# Patient Record
Sex: MALE | Race: WHITE | NOT HISPANIC OR LATINO | Employment: OTHER | URBAN - METROPOLITAN AREA
[De-identification: names, ages, dates, MRNs, and addresses within clinical notes are randomized per-mention and may not be internally consistent; named-entity substitution may affect disease eponyms.]

---

## 2017-01-17 RX ORDER — LISINOPRIL 20 MG/1
TABLET ORAL
Qty: 90 TABLET | Refills: 0 | Status: SHIPPED | OUTPATIENT
Start: 2017-01-17 | End: 2017-03-15 | Stop reason: SDUPTHER

## 2017-03-15 RX ORDER — LISINOPRIL 20 MG/1
TABLET ORAL
Qty: 90 TABLET | Refills: 2 | Status: ON HOLD | OUTPATIENT
Start: 2017-03-15 | End: 2017-08-02

## 2017-06-16 ENCOUNTER — TRANSCRIBE ORDERS (OUTPATIENT)
Dept: GASTROENTEROLOGY | Facility: CLINIC | Age: 77
End: 2017-06-16

## 2017-06-16 DIAGNOSIS — K22.70 BARRETT'S ESOPHAGUS WITHOUT DYSPLASIA: Primary | ICD-10-CM

## 2017-06-19 ENCOUNTER — TELEPHONE (OUTPATIENT)
Dept: CARDIOLOGY | Facility: CLINIC | Age: 77
End: 2017-06-19

## 2017-06-19 NOTE — TELEPHONE ENCOUNTER
Pt called stating that he has been feeling draggy and fatigued here lately. He said his b/p has been running at 114//50. 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 He seems to think that maybe he should decrease his lisinopril 20mg. Pls advise or call pt at [phone].

## 2017-07-20 ENCOUNTER — TELEPHONE (OUTPATIENT)
Dept: CARDIOLOGY | Facility: CLINIC | Age: 77
End: 2017-07-20

## 2017-07-20 NOTE — TELEPHONE ENCOUNTER
Pt called and reported an update since  the decrease Lisinopril dose to 10 mg at night.  C/o light headed and fatigue.  His bp readings for June are as follows:  120/60, 120/56, 124/57.  Yesterday pt was 108/49 and this morning before his med he was 113/55, he was not able to give me hr.  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  He starts at 0600 - 0830 comes in for breakfast and then is back out for the rest of the day.  He states that he tries to stay hydrated and keep water with him.      Current meds:   Amlodipine 5 mg in the a.m then 2.5 mg pm  Lisinopril 20 mg (1/2 tablet qhs)  Cardura 4 mg at 12 for prostate.

## 2017-08-02 ENCOUNTER — HOSPITAL ENCOUNTER (OUTPATIENT)
Facility: HOSPITAL | Age: 77
Setting detail: HOSPITAL OUTPATIENT SURGERY
Discharge: HOME | End: 2017-08-02
Attending: INTERNAL MEDICINE | Admitting: INTERNAL MEDICINE

## 2017-08-02 ENCOUNTER — ANESTHESIA (OUTPATIENT)
Dept: GASTROENTEROLOGY | Facility: HOSPITAL | Age: 77
End: 2017-08-02

## 2017-08-02 ENCOUNTER — ANESTHESIA EVENT (OUTPATIENT)
Dept: GASTROENTEROLOGY | Facility: HOSPITAL | Age: 77
End: 2017-08-02

## 2017-08-02 VITALS
SYSTOLIC BLOOD PRESSURE: 132 MMHG | HEIGHT: 70 IN | BODY MASS INDEX: 25.94 KG/M2 | OXYGEN SATURATION: 96 % | TEMPERATURE: 98.2 F | WEIGHT: 181.2 LBS | HEART RATE: 52 BPM | DIASTOLIC BLOOD PRESSURE: 73 MMHG | RESPIRATION RATE: 16 BRPM

## 2017-08-02 DIAGNOSIS — K22.70 BARRETT'S ESOPHAGUS WITHOUT DYSPLASIA: ICD-10-CM

## 2017-08-02 PROCEDURE — 87081 CULTURE SCREEN ONLY: CPT | Performed by: INTERNAL MEDICINE

## 2017-08-02 PROCEDURE — S0260 H&P FOR SURGERY: HCPCS | Performed by: INTERNAL MEDICINE

## 2017-08-02 PROCEDURE — 25010000002 PROPOFOL 10 MG/ML EMULSION: Performed by: ANESTHESIOLOGY

## 2017-08-02 PROCEDURE — 43239 EGD BIOPSY SINGLE/MULTIPLE: CPT | Performed by: INTERNAL MEDICINE

## 2017-08-02 PROCEDURE — 88312 SPECIAL STAINS GROUP 1: CPT | Performed by: INTERNAL MEDICINE

## 2017-08-02 PROCEDURE — 88305 TISSUE EXAM BY PATHOLOGIST: CPT | Performed by: INTERNAL MEDICINE

## 2017-08-02 PROCEDURE — 25010000002 PROPOFOL 1000 MG/ML EMULSION: Performed by: ANESTHESIOLOGY

## 2017-08-02 RX ORDER — SODIUM CHLORIDE 0.9 % (FLUSH) 0.9 %
1-10 SYRINGE (ML) INJECTION AS NEEDED
Status: DISCONTINUED | OUTPATIENT
Start: 2017-08-02 | End: 2017-08-02 | Stop reason: HOSPADM

## 2017-08-02 RX ORDER — PROPOFOL 10 MG/ML
VIAL (ML) INTRAVENOUS AS NEEDED
Status: DISCONTINUED | OUTPATIENT
Start: 2017-08-02 | End: 2017-08-02 | Stop reason: SURG

## 2017-08-02 RX ORDER — SODIUM CHLORIDE, SODIUM LACTATE, POTASSIUM CHLORIDE, CALCIUM CHLORIDE 600; 310; 30; 20 MG/100ML; MG/100ML; MG/100ML; MG/100ML
30 INJECTION, SOLUTION INTRAVENOUS CONTINUOUS PRN
Status: DISCONTINUED | OUTPATIENT
Start: 2017-08-02 | End: 2017-08-02 | Stop reason: HOSPADM

## 2017-08-02 RX ORDER — LIDOCAINE HYDROCHLORIDE 20 MG/ML
INJECTION, SOLUTION INFILTRATION; PERINEURAL AS NEEDED
Status: DISCONTINUED | OUTPATIENT
Start: 2017-08-02 | End: 2017-08-02 | Stop reason: SURG

## 2017-08-02 RX ADMIN — PROPOFOL 160 MCG/KG/MIN: 10 INJECTION, EMULSION INTRAVENOUS at 15:22

## 2017-08-02 RX ADMIN — PROPOFOL 140 MG: 10 INJECTION, EMULSION INTRAVENOUS at 15:22

## 2017-08-02 RX ADMIN — SODIUM CHLORIDE, POTASSIUM CHLORIDE, SODIUM LACTATE AND CALCIUM CHLORIDE 30 ML/HR: 600; 310; 30; 20 INJECTION, SOLUTION INTRAVENOUS at 14:31

## 2017-08-02 RX ADMIN — LIDOCAINE HYDROCHLORIDE 40 MG: 20 INJECTION, SOLUTION INFILTRATION; PERINEURAL at 15:22

## 2017-08-02 NOTE — ANESTHESIA POSTPROCEDURE EVALUATION
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

## 2017-08-02 NOTE — H&P
"[facility]  Pre Procedure History & Physical    Chief Complaint:   GERD, Rivas's esophagus    Subjective     HPI:   76-year-old male presents to the endoscopy suite for upper endoscopic evaluation.  He has a history of reflux as well as Rivas's esophagus.  Last upper endoscopy performed was June 2015    Past Medical History:   Past Medical History:   Diagnosis Date   • GERD (gastroesophageal reflux disease)    • Health care maintenance    • Hyperlipidemia    • Hypertension    • Osteoarthritis    • Prostate cancer        Past Surgical History:  Past Surgical History:   Procedure Laterality Date   • ROTATOR CUFF REPAIR     • TOTAL HIP ARTHROPLASTY         Family History:  History reviewed. No pertinent family history.    Social History:   reports that he has never smoked. He has never used smokeless tobacco. He reports that he does not drink alcohol.    Medications:   Prescriptions Prior to Admission   Medication Sig Dispense Refill Last Dose   • amLODIPine (NORVASC) 2.5 MG tablet Take 2.5 mg by mouth Daily. Taking 5mg in am and 2.5 mg in pm   8/2/2017 at Unknown time   • doxazosin (CARDURA) 4 MG tablet Take 4 mg by mouth Daily. AT NOON  FOR PROSTATE   8/1/2017 at Unknown time   • esomeprazole (NEXIUM) 40 MG capsule Take 40 mg by mouth.   8/2/2017 at Unknown time   • ibuprofen (ADVIL,MOTRIN) 800 MG tablet Take 800 mg by mouth.   Past Week at Unknown time   • niacin 500 MG tablet Take 500 mg by mouth Daily.   8/1/2017 at Unknown time   • Omega-3 Fatty Acids (FISH OIL) 1000 MG capsule capsule Take  by mouth.   8/1/2017 at Unknown time   • aspirin 81 MG tablet Take  by mouth Daily.   7/31/2017       Allergies:  Metoclopramide    ROS:    Pertinent items are noted in HPI, all other systems reviewed and negative     Objective     Blood pressure 147/74, pulse 56, temperature 97.5 °F (36.4 °C), temperature source Oral, resp. rate 18, height 70\" (177.8 cm), weight 181 lb 3.2 oz (82.2 kg), SpO2 98 " %.    Physical Exam   Constitutional: Pt is oriented to person, place, and time and well-developed, well-nourished, and in no distress.   Mouth/Throat: Oropharynx is clear and moist.   Neck: Normal range of motion.   Cardiovascular: Normal rate, regular rhythm and normal heart sounds.    Pulmonary/Chest: Effort normal and breath sounds normal.   Abdominal: Soft. Nontender  Skin: Skin is warm and dry.   Psychiatric: Mood, memory, affect and judgment normal.     Assessment/Plan     Diagnosis:  GERD  Rivas's esophagus    Anticipated Surgical Procedure:  EGD    The risks, benefits, and alternatives of this procedure have been discussed with the patient or the responsible party- the patient understands and agrees to proceed.

## 2017-08-02 NOTE — PLAN OF CARE
Problem: Patient Care Overview (Adult)  Goal: Plan of Care Review  Outcome: Ongoing (interventions implemented as appropriate)    08/02/17 1412   Coping/Psychosocial Response Interventions   Plan Of Care Reviewed With patient       Goal: Adult Individualization and Mutuality  Outcome: Ongoing (interventions implemented as appropriate)  Goal: Discharge Needs Assessment  Outcome: Ongoing (interventions implemented as appropriate)    08/02/17 1412   Discharge Needs Assessment   Concerns To Be Addressed no discharge needs identified   Discharge Disposition home or self-care   Living Environment   Transportation Available car;family or friend will provide         Problem: GI Endoscopy (Adult)  Goal: Signs and Symptoms of Listed Potential Problems Will be Absent or Manageable (GI Endoscopy)  Outcome: Ongoing (interventions implemented as appropriate)    08/02/17 1412   GI Endoscopy   Problems Assessed (GI Endoscopy) all   Problems Present (GI Endoscopy) none

## 2017-08-02 NOTE — ANESTHESIA PREPROCEDURE EVALUATION
Anesthesia Evaluation     Patient summary reviewed   NPO Solid Status: > 8 hours  NPO Liquid Status: > 6 hours     Airway   Mallampati: I  TM distance: >3 FB  Dental      Pulmonary    Cardiovascular     Rhythm: regular  Rate: normal    (+) hypertension, hyperlipidemia      Neuro/Psych  GI/Hepatic/Renal/Endo    (+)  GERD,     Musculoskeletal     Abdominal    Substance History      OB/GYN          Other   (+) arthritis   history of cancer remission                                    Anesthesia Plan    ASA 2     MAC   total IV anesthesia  intravenous induction   Anesthetic plan and risks discussed with patient.

## 2017-08-03 LAB — UREASE TISS QL: NEGATIVE

## 2017-08-04 LAB
CYTO UR: NORMAL
LAB AP CASE REPORT: NORMAL
Lab: NORMAL
PATH REPORT.FINAL DX SPEC: NORMAL
PATH REPORT.GROSS SPEC: NORMAL

## 2017-08-14 ENCOUNTER — TELEPHONE (OUTPATIENT)
Dept: GASTROENTEROLOGY | Facility: CLINIC | Age: 77
End: 2017-08-14

## 2017-08-14 NOTE — TELEPHONE ENCOUNTER
----- Message from Valentino PEÑA MD sent at 8/4/2017  3:01 PM EDT -----  Regarding: Biopsy results  Okay to call results, no evidence of Rivas's esophagus on this or the previous examination.  Would continue PPI, consider follow-up EGD in 5 years.  Office follow-up annually, call sooner as needed  ----- Message -----     From: Lab, Background User     Sent: 8/3/2017   5:15 PM       To: Valentino PEÑA MD

## 2017-08-14 NOTE — TELEPHONE ENCOUNTER
Call to spouse, Kassidy (see HIPPA auth of 11/9/16).  Advise per path report that duodenum bx with no celiac sprue.  Stomach antrum with mild chronic gastritis, no H pylori.  Benign fundic gland polyp.  GE junction with mild chronic inflammation.  Advise per Dr Stern that with no evidence of Rivas's esophagus on this or previous exam, would continue PPI.  Consider f/u EGD in 5 yrs.  Office f/u annually, call sooner as needed.  Kassidy verb understanding.    O/v for 8/3/18 and EGD for 8/3/22 placed in recall.

## 2017-11-14 ENCOUNTER — OFFICE VISIT (OUTPATIENT)
Dept: CARDIOLOGY | Facility: CLINIC | Age: 77
End: 2017-11-14

## 2017-11-14 VITALS
BODY MASS INDEX: 27.06 KG/M2 | WEIGHT: 189 LBS | SYSTOLIC BLOOD PRESSURE: 140 MMHG | DIASTOLIC BLOOD PRESSURE: 80 MMHG | HEIGHT: 70 IN | HEART RATE: 57 BPM

## 2017-11-14 DIAGNOSIS — I10 ESSENTIAL HYPERTENSION: Primary | ICD-10-CM

## 2017-11-14 DIAGNOSIS — R01.1 MURMUR: ICD-10-CM

## 2017-11-14 PROCEDURE — 99214 OFFICE O/P EST MOD 30 MIN: CPT | Performed by: INTERNAL MEDICINE

## 2017-11-14 PROCEDURE — 93000 ELECTROCARDIOGRAM COMPLETE: CPT | Performed by: INTERNAL MEDICINE

## 2017-11-14 NOTE — PROGRESS NOTES
Date of Office Visit: 2017  Encounter Provider: Elmira Cabezas MD  Place of Service: [facility]  Patient Name: Quincy Roberts  :1940      Patient ID:  Quincy Roberts is a 77 y.o. male is here for  followup for hypertension.        History of Present Illness    I first saw in the chest pain unit at  [facility].  He presented there with exertional chest pain, short-windedness,  and fatigue.  He went to the emergency department and his blood pressure was very high at  about 200/120.  He had not had that issue in quite some time.  Because of his chest pain,  we did an ischemic workup.  He ruled out for a myocardial infarction and had a stress  nuclear perfusion study done on January 10, 2013, which showed no ischemia.  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  During his hospitalization, he also had a lipid panel done on  January 10, 2013, which showed triglycerides of 59, HDL of 31, LDL of 82, and total  cholesterol of 125.     He did have some left upper quadrant pain and for  that he saw Dr. Stern.  He subsequently had an EGD.  He also had a CT of his abdomen  and pelvis.  The EGD looked fine.  The CT of the abdomen and pelvis suggested diverticular  disease.  He then had a colonoscopy performed and 7 polyps were removed, and I think they  have been treating him for the diverticular disease. His last EGD was 2017 and was normal.         He was diagnosed with prostate cancer in 2015. He sees Dr. Garcia  at [facility]. He underwent radiation therapy for that and it has helped, not only the  prostatic hypertrophy, but the prostate cancer.     He checks his blood pressure at home.  It's 120-130/80s.  He doesn't feels heart racing or skipping.  He's had no dizziness or syncope.  His blood pressure did get low and he was feeling fatigued.  This is why his  lisinopril was stopped.  He's had no exertional chest tightness or pressure.  He has no difficulty breathing with activity.  He is taking his medications as directed.  He continues to farm full time, he has cattle.    Past Medical History:   Diagnosis Date   • GERD (gastroesophageal reflux disease)    • Health care maintenance    • Hyperlipidemia    • Hypertension    • Osteoarthritis    • Prostate cancer          Past Surgical History:   Procedure Laterality Date   • ENDOSCOPY N/A 8/2/2017    Procedure: ESOPHAGOGASTRODUODENOSCOPY WITH COLD BIOPSIES;  Surgeon: Valentino PEÑA MD;  Location: [facility] ENDOSCOPY;  Service:    • ROTATOR CUFF REPAIR     • TOTAL HIP ARTHROPLASTY         Current Outpatient Prescriptions on File Prior to Visit   Medication Sig Dispense Refill   • amLODIPine (NORVASC) 2.5 MG tablet Take 2.5 mg by mouth Daily. Taking 5mg in am and 2.5 mg in pm     • aspirin 81 MG tablet Take  by mouth Daily.     • doxazosin (CARDURA) 4 MG tablet Take 4 mg by mouth Daily. AT NOON  FOR PROSTATE     • esomeprazole (NEXIUM) 40 MG capsule Take 40 mg by mouth.     • ibuprofen (ADVIL,MOTRIN) 800 MG tablet Take 800 mg by mouth.     • niacin 500 MG tablet Take 500 mg by mouth Daily.     • Omega-3 Fatty Acids (FISH OIL) 1000 MG capsule capsule Take  by mouth.       No current facility-administered medications on file prior to visit.        Social History     Social History   • Marital status:      Spouse name: N/A   • Number of children: N/A   • Years of education: N/A     Occupational History   • Not on file.     Social History Main Topics   • Smoking status: Never Smoker   • Smokeless tobacco: Never Used      Comment: caffine use   • Alcohol use No   • Drug use: Not on file   • Sexual activity: Not on file     Other Topics Concern   • Not on file     Social History Narrative           Review of Systems   Constitution: Negative.   HENT: Negative for congestion.    Eyes: Negative for vision loss in left eye  "and vision loss in right eye.   Respiratory: Negative.  Negative for cough, hemoptysis, shortness of breath, sleep disturbances due to breathing, snoring, sputum production and wheezing.    Endocrine: Negative.    Hematologic/Lymphatic: Negative.    Skin: Negative for poor wound healing and rash.   Musculoskeletal: Negative for falls, gout, muscle cramps and myalgias.   Gastrointestinal: Negative for abdominal pain, diarrhea, dysphagia, hematemesis, melena, nausea and vomiting.   Neurological: Negative for excessive daytime sleepiness, dizziness, headaches, light-headedness, loss of balance, seizures and vertigo.   Psychiatric/Behavioral: Negative for depression and substance abuse. The patient is not nervous/anxious.        Procedures    ECG 12 Lead  Date/Time: 11/14/2017 12:58 PM  Performed by: GREG LONDON  Authorized by: GREG LONDON   Comparison: compared with previous ECG   Similar to previous ECG  Rhythm: sinus rhythm  Clinical impression: normal ECG               Objective:      Vitals:    11/14/17 1249   BP: 140/80   BP Location: Right arm   Pulse: 57   Weight: 189 lb (85.7 kg)   Height: 70\" (177.8 cm)     Body mass index is 27.12 kg/(m^2).    Physical Exam   Constitutional: He is oriented to person, place, and time. He appears well-developed and well-nourished. No distress.   HENT:   Head: Normocephalic and atraumatic.   Eyes: Conjunctivae are normal. No scleral icterus.   Neck: Neck supple. No JVD present. Carotid bruit is not present. No thyromegaly present.   Cardiovascular: Normal rate, regular rhythm, S1 normal, S2 normal and intact distal pulses.   No extrasystoles are present. PMI is not displaced.  Exam reveals no gallop.    Murmur heard.   Harsh midsystolic murmur is present with a grade of 3/6  at the upper right sternal border, upper left sternal border  Pulses:       Carotid pulses are 2+ on the right side, and 2+ on the left side.       Radial pulses are 2+ on the right side, " and 2+ on the left side.        Dorsalis pedis pulses are 2+ on the right side, and 2+ on the left side.        Posterior tibial pulses are 2+ on the right side, and 2+ on the left side.   Pulmonary/Chest: Effort normal and breath sounds normal. No respiratory distress. He has no wheezes. He has no rhonchi. He has no rales. He exhibits no tenderness.   Abdominal: Soft. Bowel sounds are normal. He exhibits no distension, no abdominal bruit and no mass. There is no tenderness.   Musculoskeletal: He exhibits no edema or deformity.   Lymphadenopathy:     He has no cervical adenopathy.   Neurological: He is alert and oriented to person, place, and time. No cranial nerve deficit.   Skin: Skin is warm and dry. No rash noted. He is not diaphoretic. No cyanosis. No pallor. Nails show no clubbing.   Psychiatric: He has a normal mood and affect. Judgment normal.   Vitals reviewed.      Lab Review:       Assessment:      Diagnosis Plan   1. Essential hypertension       1. Non-cardiac chest pain. Normal stress nuclear perfusion study done January 2013.  2. Hypertension, well controlled. Could not tolerate coreg.  Is on amlodipine.   3. History of renal cyst, stable.  4. Hyperlipidemia in the form of low HDL.   5. 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   6. Stage 1 prostate cancer 2/2015, s/p radiation with Dr. Burns.     Plan:       Check echo for murmur, see NP in 1 year.  No med changes.

## 2017-11-21 ENCOUNTER — HOSPITAL ENCOUNTER (OUTPATIENT)
Dept: CARDIOLOGY | Facility: HOSPITAL | Age: 77
Discharge: HOME | End: 2017-11-21
Attending: INTERNAL MEDICINE | Admitting: INTERNAL MEDICINE

## 2017-11-21 VITALS
BODY MASS INDEX: 27.06 KG/M2 | HEIGHT: 70 IN | HEART RATE: 61 BPM | DIASTOLIC BLOOD PRESSURE: 80 MMHG | WEIGHT: 189 LBS | SYSTOLIC BLOOD PRESSURE: 150 MMHG

## 2017-11-21 DIAGNOSIS — R01.1 MURMUR: ICD-10-CM

## 2017-11-21 DIAGNOSIS — I10 ESSENTIAL HYPERTENSION: ICD-10-CM

## 2017-11-21 LAB
ASCENDING AORTA: 3.2 CM
BH CV ECHO MEAS - ACS: 1.9 CM
BH CV ECHO MEAS - AO MAX PG (FULL): 1.2 MMHG
BH CV ECHO MEAS - AO MAX PG: 5.9 MMHG
BH CV ECHO MEAS - AO MEAN PG (FULL): 1.4 MMHG
BH CV ECHO MEAS - AO MEAN PG: 4.1 MMHG
BH CV ECHO MEAS - AO ROOT AREA (BSA CORRECTED): 1.6
BH CV ECHO MEAS - AO ROOT AREA: 8.4 CM^2
BH CV ECHO MEAS - AO ROOT DIAM: 3.3 CM
BH CV ECHO MEAS - AO V2 MAX: 121.8 CM/SEC
BH CV ECHO MEAS - AO V2 MEAN: 97.9 CM/SEC
BH CV ECHO MEAS - AO V2 VTI: 27.6 CM
BH CV ECHO MEAS - AVA(I,A): 3.1 CM^2
BH CV ECHO MEAS - AVA(I,D): 3.1 CM^2
BH CV ECHO MEAS - AVA(V,A): 3 CM^2
BH CV ECHO MEAS - AVA(V,D): 3 CM^2
BH CV ECHO MEAS - BSA(HAYCOCK): 2.1 M^2
BH CV ECHO MEAS - BSA: 2 M^2
BH CV ECHO MEAS - BZI_BMI: 27.1 KILOGRAMS/M^2
BH CV ECHO MEAS - BZI_METRIC_HEIGHT: 177.8 CM
BH CV ECHO MEAS - BZI_METRIC_WEIGHT: 85.7 KG
BH CV ECHO MEAS - CONTRAST EF (2CH): 59.1 ML/M^2
BH CV ECHO MEAS - CONTRAST EF 4CH: 60.2 ML/M^2
BH CV ECHO MEAS - EDV(MOD-SP2): 137 ML
BH CV ECHO MEAS - EDV(MOD-SP4): 166 ML
BH CV ECHO MEAS - EDV(TEICH): 164 ML
BH CV ECHO MEAS - EF(CUBED): 62.7 %
BH CV ECHO MEAS - EF(MOD-SP2): 59.1 %
BH CV ECHO MEAS - EF(MOD-SP4): 60.2 %
BH CV ECHO MEAS - EF(TEICH): 53.5 %
BH CV ECHO MEAS - ESV(MOD-SP2): 56 ML
BH CV ECHO MEAS - ESV(MOD-SP4): 66 ML
BH CV ECHO MEAS - ESV(TEICH): 76.3 ML
BH CV ECHO MEAS - FS: 28 %
BH CV ECHO MEAS - IVS/LVPW: 1.1
BH CV ECHO MEAS - IVSD: 1 CM
BH CV ECHO MEAS - LAT PEAK E' VEL: 5 CM/SEC
BH CV ECHO MEAS - LV DIASTOLIC VOL/BSA (35-75): 81.5 ML/M^2
BH CV ECHO MEAS - LV MASS(C)D: 230.1 GRAMS
BH CV ECHO MEAS - LV MASS(C)DI: 112.9 GRAMS/M^2
BH CV ECHO MEAS - LV MAX PG: 4.8 MMHG
BH CV ECHO MEAS - LV MEAN PG: 2.7 MMHG
BH CV ECHO MEAS - LV SYSTOLIC VOL/BSA (12-30): 32.4 ML/M^2
BH CV ECHO MEAS - LV V1 MAX: 109.1 CM/SEC
BH CV ECHO MEAS - LV V1 MEAN: 75.9 CM/SEC
BH CV ECHO MEAS - LV V1 VTI: 25.4 CM
BH CV ECHO MEAS - LVIDD: 5.8 CM
BH CV ECHO MEAS - LVIDS: 4.1 CM
BH CV ECHO MEAS - LVLD AP2: 8 CM
BH CV ECHO MEAS - LVLD AP4: 8 CM
BH CV ECHO MEAS - LVLS AP2: 6.9 CM
BH CV ECHO MEAS - LVLS AP4: 7.1 CM
BH CV ECHO MEAS - LVOT AREA (M): 3.5 CM^2
BH CV ECHO MEAS - LVOT AREA: 3.4 CM^2
BH CV ECHO MEAS - LVOT DIAM: 2.1 CM
BH CV ECHO MEAS - LVPWD: 0.96 CM
BH CV ECHO MEAS - MED PEAK E' VEL: 8 CM/SEC
BH CV ECHO MEAS - MR MAX PG: 117.9 MMHG
BH CV ECHO MEAS - MR MAX VEL: 542.9 CM/SEC
BH CV ECHO MEAS - MV A DUR: 0.12 SEC
BH CV ECHO MEAS - MV A MAX VEL: 92.6 CM/SEC
BH CV ECHO MEAS - MV DEC SLOPE: 391.8 CM/SEC^2
BH CV ECHO MEAS - MV DEC TIME: 0.16 SEC
BH CV ECHO MEAS - MV E MAX VEL: 65.5 CM/SEC
BH CV ECHO MEAS - MV E/A: 0.71
BH CV ECHO MEAS - MV MAX PG: 3 MMHG
BH CV ECHO MEAS - MV MEAN PG: 1.3 MMHG
BH CV ECHO MEAS - MV P1/2T MAX VEL: 64 CM/SEC
BH CV ECHO MEAS - MV P1/2T: 47.9 MSEC
BH CV ECHO MEAS - MV V2 MAX: 86.1 CM/SEC
BH CV ECHO MEAS - MV V2 MEAN: 52.1 CM/SEC
BH CV ECHO MEAS - MV V2 VTI: 35.2 CM
BH CV ECHO MEAS - MVA P1/2T LCG: 3.4 CM^2
BH CV ECHO MEAS - MVA(P1/2T): 4.6 CM^2
BH CV ECHO MEAS - MVA(VTI): 2.4 CM^2
BH CV ECHO MEAS - PA ACC TIME: 0.13 SEC
BH CV ECHO MEAS - PA MAX PG (FULL): 6.2 MMHG
BH CV ECHO MEAS - PA MAX PG: 8.1 MMHG
BH CV ECHO MEAS - PA PR(ACCEL): 22 MMHG
BH CV ECHO MEAS - PA V2 MAX: 142.5 CM/SEC
BH CV ECHO MEAS - PI END-D VEL: 103.1 CM/SEC
BH CV ECHO MEAS - PULM A REVS DUR: 0.1 SEC
BH CV ECHO MEAS - PULM A REVS VEL: 29.6 CM/SEC
BH CV ECHO MEAS - PULM DIAS VEL: 40.7 CM/SEC
BH CV ECHO MEAS - PULM S/D: 1.8
BH CV ECHO MEAS - PULM SYS VEL: 75.2 CM/SEC
BH CV ECHO MEAS - PVA(V,A): 1.8 CM^2
BH CV ECHO MEAS - PVA(V,D): 1.8 CM^2
BH CV ECHO MEAS - QP/QS: 0.6
BH CV ECHO MEAS - RAP SYSTOLE: 8 MMHG
BH CV ECHO MEAS - RV MAX PG: 2 MMHG
BH CV ECHO MEAS - RV MEAN PG: 1 MMHG
BH CV ECHO MEAS - RV V1 MAX: 69.8 CM/SEC
BH CV ECHO MEAS - RV V1 MEAN: 46.6 CM/SEC
BH CV ECHO MEAS - RV V1 VTI: 14.1 CM
BH CV ECHO MEAS - RVOT AREA: 3.6 CM^2
BH CV ECHO MEAS - RVOT DIAM: 2.2 CM
BH CV ECHO MEAS - RVSP: 46 MMHG
BH CV ECHO MEAS - SI(AO): 114.3 ML/M^2
BH CV ECHO MEAS - SI(CUBED): 58.8 ML/M^2
BH CV ECHO MEAS - SI(LVOT): 41.8 ML/M^2
BH CV ECHO MEAS - SI(MOD-SP2): 39.7 ML/M^2
BH CV ECHO MEAS - SI(MOD-SP4): 49.1 ML/M^2
BH CV ECHO MEAS - SI(TEICH): 43.1 ML/M^2
BH CV ECHO MEAS - SUP REN AO DIAM: 1.9 CM
BH CV ECHO MEAS - SV(AO): 233 ML
BH CV ECHO MEAS - SV(CUBED): 119.9 ML
BH CV ECHO MEAS - SV(LVOT): 85.1 ML
BH CV ECHO MEAS - SV(MOD-SP2): 81 ML
BH CV ECHO MEAS - SV(MOD-SP4): 100 ML
BH CV ECHO MEAS - SV(RVOT): 51.3 ML
BH CV ECHO MEAS - SV(TEICH): 87.7 ML
BH CV ECHO MEAS - TAPSE (>1.6): 2.4 CM2
BH CV ECHO MEAS - TR MAX VEL: 308.1 CM/SEC
BH CV XLRA - RV BASE: 3.8 CM
BH CV XLRA - TDI S': 16 CM/SEC
E/E' RATIO: 0.7
LEFT ATRIUM VOLUME INDEX: 36 ML/M2
LV EF 2D ECHO EST: 60 %
SINUS: 3.1 CM
STJ: 2.9 CM

## 2017-11-21 PROCEDURE — 93306 TTE W/DOPPLER COMPLETE: CPT | Performed by: INTERNAL MEDICINE

## 2017-11-21 PROCEDURE — 93306 TTE W/DOPPLER COMPLETE: CPT

## 2017-11-27 ENCOUNTER — TELEPHONE (OUTPATIENT)
Dept: CARDIOLOGY | Facility: CLINIC | Age: 77
End: 2017-11-27

## 2017-11-27 DIAGNOSIS — I27.20 PULMONARY HYPERTENSION (HCC): Primary | ICD-10-CM

## 2017-11-27 NOTE — TELEPHONE ENCOUNTER
Pt called and wants to know if you can send in a referral to Dr Wesley Cortes's office.....Lena GOLDEN

## 2017-12-01 ENCOUNTER — HOSPITAL ENCOUNTER (OUTPATIENT)
Dept: NUCLEAR MEDICINE | Facility: HOSPITAL | Age: 77
Discharge: HOME | End: 2017-12-01
Attending: INTERNAL MEDICINE

## 2017-12-01 ENCOUNTER — TRANSCRIBE ORDERS (OUTPATIENT)
Dept: ADMINISTRATIVE | Facility: HOSPITAL | Age: 77
End: 2017-12-01

## 2017-12-01 DIAGNOSIS — I27.20 PULMONARY HYPERTENSION (HCC): Primary | ICD-10-CM

## 2017-12-01 DIAGNOSIS — I27.20 PULMONARY HYPERTENSION (HCC): ICD-10-CM

## 2017-12-01 PROCEDURE — 78582 LUNG VENTILAT&PERFUS IMAGING: CPT

## 2017-12-01 PROCEDURE — 0 XENON XE 133: Performed by: INTERNAL MEDICINE

## 2017-12-01 PROCEDURE — A9558 XE133 XENON 10MCI: HCPCS | Performed by: INTERNAL MEDICINE

## 2017-12-01 PROCEDURE — A9540 TC99M MAA: HCPCS | Performed by: INTERNAL MEDICINE

## 2017-12-01 PROCEDURE — 0 TECHNETIUM ALBUMIN AGGREGATED: Performed by: INTERNAL MEDICINE

## 2017-12-01 RX ADMIN — Medication 1 DOSE: at 11:30

## 2017-12-01 RX ADMIN — XENON XE-133 8 MILLICURIE: 10 GAS RESPIRATORY (INHALATION) at 11:30

## 2017-12-20 ENCOUNTER — APPOINTMENT (OUTPATIENT)
Dept: GENERAL RADIOLOGY | Facility: HOSPITAL | Age: 77
End: 2017-12-20

## 2017-12-20 ENCOUNTER — APPOINTMENT (OUTPATIENT)
Dept: CT IMAGING | Facility: HOSPITAL | Age: 77
End: 2017-12-20

## 2017-12-20 ENCOUNTER — HOSPITAL ENCOUNTER (EMERGENCY)
Facility: HOSPITAL | Age: 77
Discharge: HOME | End: 2017-12-20
Attending: EMERGENCY MEDICINE | Admitting: EMERGENCY MEDICINE

## 2017-12-20 ENCOUNTER — TELEPHONE (OUTPATIENT)
Dept: ORTHOPEDIC SURGERY | Facility: CLINIC | Age: 77
End: 2017-12-20

## 2017-12-20 VITALS
DIASTOLIC BLOOD PRESSURE: 73 MMHG | TEMPERATURE: 98.5 F | HEART RATE: 72 BPM | RESPIRATION RATE: 18 BRPM | OXYGEN SATURATION: 97 % | SYSTOLIC BLOOD PRESSURE: 143 MMHG | WEIGHT: 180 LBS | HEIGHT: 70 IN | BODY MASS INDEX: 25.77 KG/M2

## 2017-12-20 DIAGNOSIS — M97.8XXA PERIPROSTHETIC FRACTURE OF FEMUR FOLLOWING TOTAL REPLACEMENT OF HIP, INITIAL ENCOUNTER: Primary | ICD-10-CM

## 2017-12-20 DIAGNOSIS — Z96.649 PERIPROSTHETIC FRACTURE OF FEMUR FOLLOWING TOTAL REPLACEMENT OF HIP, INITIAL ENCOUNTER: Primary | ICD-10-CM

## 2017-12-20 PROCEDURE — 73502 X-RAY EXAM HIP UNI 2-3 VIEWS: CPT

## 2017-12-20 PROCEDURE — 99283 EMERGENCY DEPT VISIT LOW MDM: CPT

## 2017-12-20 PROCEDURE — 72192 CT PELVIS W/O DYE: CPT

## 2017-12-20 NOTE — ED PROVIDER NOTES
The patient presents complaining of R hip pain s/p fall yesterday afternoon. Pt states that he has been able to stand, but is having difficulty walking.     Physical Exam:   Ambulation labored due to pain with weight bearing on R hip.     Radiology:   CT pelvis: nondisplaced cortical fracture and lateral L proximal femur.     Plan to d/c the pt with referrals to Dr. Zachary Barnard and Dr. Hutchison.     I supervised care provided by the midlevel provider.  We have discussed this patient's history, physical exam, and treatment plan.  I have reviewed the note and personally saw and examined the patient and agree with the plan of care.    Documentation assistance provided by grayson Parekh for Dr. Turner.  Information recorded by the grayson was done at my direction and has been verified and validated by me.           Refugio Parekh  12/20/17 8909       Sander Turner MD  12/20/17 3442

## 2017-12-20 NOTE — ED PROVIDER NOTES
EMERGENCY DEPARTMENT ENCOUNTER    CHIEF COMPLAINT  Chief Complaint: R hip  History given by: Pt  History limited by: Nothing  Room Number: 03/03  PMD: Jose Valle MD      HPI:  Pt is a 77 y.o. male who presents complaining of R hip pain s/p fall yesterday. Pt reports he was knocked over by a dairy cow yesterday and landed on his R hip. Pt reports he was able to ambulate with difficulty at the scene. He states his pain is not worsened by standing upright, but is worse with certain movements of his RLE. Pt states he had his R hip replaced in 2011. Pt denies any other complaints at this time.     Duration:  yesterday  Onset: Sudden  Timing: constant  Location: R hip  Radiation: none  Quality: pain s/p mechanical fall  Intensity/Severity: moderate  Progression: unchanged  Associated Symptoms: none stated  Aggravating Factors: movement  Alleviating Factors: none  Previous Episodes: None stated  Treatment before arrival: Pt states he had his R hip replaced in 2011    PAST MEDICAL HISTORY  Active Ambulatory Problems     Diagnosis Date Noted   • Hypertension      Resolved Ambulatory Problems     Diagnosis Date Noted   • No Resolved Ambulatory Problems     Past Medical History:   Diagnosis Date   • GERD (gastroesophageal reflux disease)    • Health care maintenance    • Hyperlipidemia    • Hypertension    • Osteoarthritis    • Prostate cancer        PAST SURGICAL HISTORY  Past Surgical History:   Procedure Laterality Date   • ENDOSCOPY N/A 8/2/2017    Procedure: ESOPHAGOGASTRODUODENOSCOPY WITH COLD BIOPSIES;  Surgeon: Valentino PEÑA MD;  Location: [facility] ENDOSCOPY;  Service:    • ROTATOR CUFF REPAIR     • TOTAL HIP ARTHROPLASTY         FAMILY HISTORY  Family History   Problem Relation Age of Onset   • Family history unknown: Yes       SOCIAL HISTORY  Social History     Social History   • Marital status:      Spouse name: N/A   • Number of children: N/A   • Years of education: N/A     Occupational  History   • Not on file.     Social History Main Topics   • Smoking status: Never Smoker   • Smokeless tobacco: Never Used      Comment: caffine use   • Alcohol use No   • Drug use: Not on file   • Sexual activity: Not on file     Other Topics Concern   • Not on file     Social History Narrative       ALLERGIES  Metoclopramide    REVIEW OF SYSTEMS  Review of Systems   Constitutional: Negative for fever.   Respiratory: Negative for shortness of breath.    Cardiovascular: Negative for chest pain.   Musculoskeletal: Positive for arthralgias (R hip pain).   Skin: Negative.    Neurological: Negative for weakness and numbness.   All other systems reviewed and are negative.      PHYSICAL EXAM  ED Triage Vitals   Temp Heart Rate Resp BP SpO2   12/20/17 1150 12/20/17 1150 12/20/17 1150 12/20/17 1150 12/20/17 1152   98.4 °F (36.9 °C) 76 16 124/68 95 %      Temp src Heart Rate Source Patient Position BP Location FiO2 (%)   12/20/17 1529 12/20/17 1529 12/20/17 1529 12/20/17 1529 --   Oral Monitor Sitting Right arm        Physical Exam   Constitutional: He is oriented to person, place, and time and well-developed, well-nourished, and in no distress.   HENT:   Head: Normocephalic and atraumatic.   Eyes: EOM are normal. Pupils are equal, round, and reactive to light.   Neck: Normal range of motion. Neck supple.   Cardiovascular: Normal rate, regular rhythm and normal heart sounds.    Pulmonary/Chest: Effort normal and breath sounds normal. No respiratory distress.   Abdominal: Soft. There is no tenderness. There is no rebound and no guarding.   Musculoskeletal: He exhibits no edema.        Right hip: He exhibits decreased range of motion (when attempting to ambulate).        Right knee: He exhibits normal range of motion.   No vertebral back tenderness. Pt is able to stand still on his feet without pain.    Neurological: He is alert and oriented to person, place, and time. He has normal sensation and normal strength.   Pt is  neurovascularly intact distally, and pt is able to lift his R leg off the bed.    Skin: Skin is warm and dry.   Psychiatric: Mood and affect normal.   Nursing note and vitals reviewed.    RADIOLOGY  CT Pelvis Without Contrast   Final Result       Questionable appearance of nondisplaced cortical fracture at the   anterior lateral aspect of the proximal femur inferior to the greater   trochanter, as described above.       Discussed by telephone with Dr. Ruiz at time of interpretation, 1651,   12/20/2017.       This report was finalized on 12/20/2017 4:54 PM by Dr. Reyes Anglin MD.          XR Hip With or Without Pelvis 2 - 3 View Right   Final Result       No acute fracture is identified. Follow up as indications persist.       This report was finalized on 12/20/2017 12:21 PM by Dr. Reyes Anglin MD.               I ordered the above noted radiological studies. Interpreted by radiologist. Discussed with radiologist (Dr. Anglin). Reviewed by me in PACS.       PROCEDURES  Procedures      PROGRESS AND CONSULTS  ED Course     1558 - CT pelvis ordered to rule out occult fracture.     1654 - Consult placed with ortho.      1655 - Rechecked pt. Pt is resting comfortably. Informed pt of the questionable greater trochanteric fracture. Informed them of the pending consult with Dr. Hutchison.     1708 - Consulted with Dr. Hutchison (orthopedist) who recommends the pt using a walker with limited weight bearing, and states he will see the pt in follow up.     1711 - Rechecked pt. Pt is resting comfortably. Informed pt of the consult with Dr. Hutchison and plans to discharge home to follow up with him. Pt and wife state they have a walker at home. Instructed pt to use the walker when ambulating after discharge. Pt and family understand and agree with plan. All questions answered.        MEDICAL DECISION MAKING  Results were reviewed/discussed with the patient and they were also made aware of online access. Pt also  made aware that some labs, such as cultures, will not be resulted during ER visit and follow up with PMD is necessary.     MDM  Number of Diagnoses or Management Options  Periprosthetic fracture of femur following total replacement of hip, initial encounter:      Amount and/or Complexity of Data Reviewed  Tests in the radiology section of CPT®: ordered and reviewed (XR R hip - no acute fracture  CT pelvis - Questionable nondisplaced cortical fracture at the anterior lateral aspect of the proximal femur)  Discussion of test results with the performing providers: yes (Dr. Anglin)  Discuss the patient with other providers: yes (Dr. Hutchison (ortho))           DIAGNOSIS  Final diagnoses:   Periprosthetic fracture of femur following total replacement of hip, initial encounter       DISPOSITION  DISCHARGE    Patient discharged in stable condition.    Reviewed implications of results, diagnosis, meds, responsibility to follow up, warning signs and symptoms of possible worsening, potential complications and reasons to return to ER.    Patient/Family voiced understanding of above instructions.    Discussed plan for discharge, as there is no emergent indication for admission.  Pt/family is agreeable and understands need for follow up and repeat testing.  Pt is aware that discharge does not mean that nothing is wrong but it indicates no emergency is present that requires admission and they must continue care with follow-up as given below or physician of their choice.     FOLLOW-UP  Bobbi Hutchison MD  [facility] [place] [address]  [phone]      call for appointment in 3-4 weeks         Medication List      Notice     No changes were made to your prescriptions during this visit.            Latest Documented Vital Signs:  As of 5:23 PM  BP- 143/73 HR- 72 Temp- 98.5 °F (36.9 °C) (Oral) O2 sat- 97%    --  Documentation assistance provided by grayson Barnard for Jose Elias Ruiz PA-C.  Information  recorded by the scribe was done at my direction and has been verified and validated by me.        Carl Barnard  12/20/17 1723       CLAY Longoria  12/20/17 2007

## 2017-12-22 NOTE — TELEPHONE ENCOUNTER
Can see RBB or MLL next available - I have reviewed his x-rays and notes from the ER and my recommendation is that he use either crutches or walker and rest it and he should be fine to wait for the next few weeks.  I have no further treatment that I would recommend should I see him before that time.

## 2018-02-02 ENCOUNTER — OFFICE VISIT (OUTPATIENT)
Dept: ORTHOPEDIC SURGERY | Facility: CLINIC | Age: 78
End: 2018-02-02

## 2018-02-02 VITALS — WEIGHT: 182 LBS | BODY MASS INDEX: 26.05 KG/M2 | HEIGHT: 70 IN

## 2018-02-02 DIAGNOSIS — Z96.641 STATUS POST RIGHT HIP REPLACEMENT: Primary | ICD-10-CM

## 2018-02-02 DIAGNOSIS — M16.12 PRIMARY OSTEOARTHRITIS OF LEFT HIP: ICD-10-CM

## 2018-02-02 PROCEDURE — 99203 OFFICE O/P NEW LOW 30 MIN: CPT | Performed by: ORTHOPAEDIC SURGERY

## 2018-02-02 NOTE — PROGRESS NOTES
Patient: Quincy Roberts  YOB: 1940 77 y.o. male  Medical Record Number: [id number]    Chief Complaint:   Chief Complaint   Patient presents with   • Left Hip - Pain, Establish Care   • Right Hip - Pain, Establish Care       History of Present Illness:Quincy Roberts is a 77 y.o. male who presents for follow-up of  Right hip.  He had a total hip replacement done by Dr. Ortiz about 8 years ago.  He was doing well.  He sustained a fall had significant pain 6 weeks ago was sent to the emergency department.  It was believed that he had a nondisplaced proximal femur fracture.  He has 10 on a walker and then cane and is been off a cane for about 2 weeks.  He is back to normal he has no pain.  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 Taking 5mg in am and 2.5 mg in pm     • aspirin 81 MG tablet Take  by mouth Daily.     • doxazosin (CARDURA) 4 MG tablet Take 4 mg by mouth Daily. AT NOON  FOR PROSTATE     • esomeprazole (NEXIUM) 40 MG capsule Take 40 mg by mouth.     • ibuprofen (ADVIL,MOTRIN) 800 MG tablet Take 800 mg by mouth.     • niacin 500 MG tablet Take 500 mg by mouth Daily.     • Omega-3 Fatty Acids (FISH OIL) 1000 MG capsule capsule Take  by mouth.       No current facility-administered medications for this visit.          The following portions of the patient's history were reviewed and updated as appropriate: allergies, current medications, past family history, past medical history, past social history, past surgical history and problem list.    Review of Systems:   A 14 point review of systems was performed. All systems negative except pertinent positives/negative listed in HPI above    Physical Exam:   Vitals:    02/02/18 1247   Weight: 82.6 kg (182 lb)   Height: 177.8 cm  "(70\")       General: A and O x 3, ASA, NAD    SCLERA:    Normal    DENTITION:   Normal  Hip:  right    LEG ALIGNMENT:     Neutral   ,    equal leg lengths    GAIT:     Nonantalgic    SKIN:     No abnormality    RANGE OF MOTION:      Full without joint irritability    STRENGTH:     5 / 5    hip flexion and abduction    DISTAL PULSES:    Paplable    DISTAL SENSATION :   Intact    LYMPHATICS:     No   lymphadenopathy    OTHER:          - Negative Stinchfeld test      - Negative log roll      - No Tenderness to palpation trochanteric bursa      - Neg FADIR      - Neg XIAO      - No SI tenderness     Radiology:    Xrays 2views both hips (AP bilateral hips and lateral hip) were ordered and reviewed for evaluation of hip pain demonstrating left hip mild joint space narrowing and a well  Positioned right  total hip without evidence of wear, loosening, or osteoarthritis  Comparison views: todays xrays were compared to previous xrays and demonstrate no change    Assessment/Plan:  Right total hip doing well 6 weeks after a fall and nondisplaced fracture which I think is healed.  He has mild left hip arthritis.  I'll see him back as needed.      Marco Barnard MD  2/2/2018  "

## 2018-05-01 ENCOUNTER — OFFICE VISIT CONVERTED (OUTPATIENT)
Dept: FAMILY MEDICINE CLINIC | Age: 78
End: 2018-05-01
Attending: FAMILY MEDICINE

## 2018-06-19 ENCOUNTER — PREP FOR SURGERY (OUTPATIENT)
Dept: OTHER | Facility: HOSPITAL | Age: 78
End: 2018-06-19

## 2018-06-19 DIAGNOSIS — Z86.010 HX OF ADENOMATOUS COLONIC POLYPS: Primary | ICD-10-CM

## 2018-06-19 PROBLEM — Z86.0101 HX OF ADENOMATOUS COLONIC POLYPS: Status: ACTIVE | Noted: 2018-06-19

## 2018-08-09 RX ORDER — AMLODIPINE BESYLATE 5 MG/1
5 TABLET ORAL EVERY MORNING
COMMUNITY
End: 2019-11-21 | Stop reason: SDUPTHER

## 2018-08-10 ENCOUNTER — ANESTHESIA (OUTPATIENT)
Dept: GASTROENTEROLOGY | Facility: HOSPITAL | Age: 78
End: 2018-08-10

## 2018-08-10 ENCOUNTER — HOSPITAL ENCOUNTER (OUTPATIENT)
Facility: HOSPITAL | Age: 78
Setting detail: HOSPITAL OUTPATIENT SURGERY
Discharge: HOME | End: 2018-08-10
Attending: INTERNAL MEDICINE | Admitting: INTERNAL MEDICINE

## 2018-08-10 ENCOUNTER — ANESTHESIA EVENT (OUTPATIENT)
Dept: GASTROENTEROLOGY | Facility: HOSPITAL | Age: 78
End: 2018-08-10

## 2018-08-10 VITALS
BODY MASS INDEX: 25.35 KG/M2 | DIASTOLIC BLOOD PRESSURE: 69 MMHG | TEMPERATURE: 97.9 F | HEART RATE: 46 BPM | OXYGEN SATURATION: 97 % | HEIGHT: 70 IN | WEIGHT: 177.06 LBS | RESPIRATION RATE: 16 BRPM | SYSTOLIC BLOOD PRESSURE: 132 MMHG

## 2018-08-10 PROCEDURE — 25010000002 PROPOFOL 10 MG/ML EMULSION: Performed by: ANESTHESIOLOGY

## 2018-08-10 PROCEDURE — G0105 COLORECTAL SCRN; HI RISK IND: HCPCS | Performed by: INTERNAL MEDICINE

## 2018-08-10 PROCEDURE — S0260 H&P FOR SURGERY: HCPCS | Performed by: INTERNAL MEDICINE

## 2018-08-10 RX ORDER — LIDOCAINE HYDROCHLORIDE 20 MG/ML
INJECTION, SOLUTION INFILTRATION; PERINEURAL AS NEEDED
Status: DISCONTINUED | OUTPATIENT
Start: 2018-08-10 | End: 2018-08-10 | Stop reason: SURG

## 2018-08-10 RX ORDER — SODIUM CHLORIDE, SODIUM LACTATE, POTASSIUM CHLORIDE, CALCIUM CHLORIDE 600; 310; 30; 20 MG/100ML; MG/100ML; MG/100ML; MG/100ML
1000 INJECTION, SOLUTION INTRAVENOUS CONTINUOUS
Status: DISCONTINUED | OUTPATIENT
Start: 2018-08-10 | End: 2018-08-10 | Stop reason: HOSPADM

## 2018-08-10 RX ORDER — PROPOFOL 10 MG/ML
VIAL (ML) INTRAVENOUS AS NEEDED
Status: DISCONTINUED | OUTPATIENT
Start: 2018-08-10 | End: 2018-08-10 | Stop reason: SURG

## 2018-08-10 RX ORDER — SODIUM CHLORIDE 0.9 % (FLUSH) 0.9 %
3 SYRINGE (ML) INJECTION AS NEEDED
Status: DISCONTINUED | OUTPATIENT
Start: 2018-08-10 | End: 2018-08-10 | Stop reason: HOSPADM

## 2018-08-10 RX ADMIN — LIDOCAINE HYDROCHLORIDE 60 MG: 20 INJECTION, SOLUTION INFILTRATION; PERINEURAL at 08:12

## 2018-08-10 RX ADMIN — PROPOFOL 150 MG: 10 INJECTION, EMULSION INTRAVENOUS at 08:11

## 2018-08-10 RX ADMIN — SODIUM CHLORIDE, POTASSIUM CHLORIDE, SODIUM LACTATE AND CALCIUM CHLORIDE 1000 ML: 600; 310; 30; 20 INJECTION, SOLUTION INTRAVENOUS at 07:42

## 2018-08-10 RX ADMIN — PROPOFOL 150 MG: 10 INJECTION, EMULSION INTRAVENOUS at 08:25

## 2018-08-10 NOTE — DISCHARGE INSTRUCTIONS
For the next 24 hours patient needs to be with a responsible adult.    For 24 hours DO NOT drive, operate machinery, appliances, drink alcohol, make important decisions or sign legal documents.    Start with a light or bland diet and advance to regular diet as tolerated.    Follow recommendations on procedure report provided by your doctor.    Call Dr Stern for problems [phone]    Problems may include but not limited to: large amounts of bleeding, trouble breathing, repeated vomiting, severe unrelieved pain, fever or chills.

## 2018-08-10 NOTE — ANESTHESIA PREPROCEDURE EVALUATION
Anesthesia Evaluation     Patient summary reviewed and Nursing notes reviewed   NPO Solid Status: > 8 hours  NPO Liquid Status: > 2 hours           Airway   Mallampati: I  TM distance: >3 FB  Dental - normal exam     Pulmonary - normal exam   Cardiovascular - normal exam    (+) hypertension less than 2 medications,       Neuro/Psych  GI/Hepatic/Renal/Endo    (+)  GERD,      Musculoskeletal     Abdominal    Substance History      OB/GYN          Other   (+) arthritis                     Anesthesia Plan    ASA 2     MAC     Anesthetic plan and risks discussed with patient.

## 2018-08-10 NOTE — H&P
[facility]  Pre Procedure History & Physical    Chief Complaint:   History of polyps    Subjective     HPI:   This 77-year-old male presents to the endoscopy suite for colonoscopic evaluation.  He carries a history of polyps.  Last colonoscopy performed in June 2015 with adenomatous polyp removed in the ascending colon.    Past Medical History:   Past Medical History:   Diagnosis Date   • GERD (gastroesophageal reflux disease)    • Health care maintenance    • History of colon polyps    • History of prostate cancer 2014   • History of radiation therapy    • Hypertension    • Osteoarthritis        Past Surgical History:  Past Surgical History:   Procedure Laterality Date   • ENDOSCOPY N/A 8/2/2017    Procedure: ESOPHAGOGASTRODUODENOSCOPY WITH COLD BIOPSIES;  Surgeon: Valentino PEÑA MD;  Location: [facility] ENDOSCOPY;  Service:    • HERNIA REPAIR  2010   • ROTATOR CUFF REPAIR Left    • TOTAL HIP ARTHROPLASTY Right        Family History:  Family History   Problem Relation Age of Onset   • Malig Hyperthermia Neg Hx        Social History:   reports that he has never smoked. He has never used smokeless tobacco. He reports that he does not drink alcohol or use drugs.    Medications:   Prescriptions Prior to Admission   Medication Sig Dispense Refill Last Dose   • amLODIPine (NORVASC) 5 MG tablet Take 5 mg by mouth Every Morning.   8/10/2018 at 0630   • aspirin 81 MG tablet Take 81 mg by mouth Daily.   8/8/2018   • esomeprazole (NEXIUM) 40 MG capsule Take 40 mg by mouth Every Morning Before Breakfast.   8/10/2018 at 0630   • ibuprofen (ADVIL,MOTRIN) 800 MG tablet Take 800 mg by mouth As Needed.   8/3/2018   • Lactobacillus (PROBIOTIC ACIDOPHILUS PO) Take 1 tablet by mouth Daily.   8/8/2018   • niacin 500 MG tablet Take 250 mg by mouth Daily.   8/7/2018   • Omega-3 Fatty Acids (FISH OIL) 1000 MG capsule capsule Take 1,000 mg by mouth Daily With Breakfast.   8/3/2018  "      Allergies:  Metoclopramide    ROS:    Pertinent items are noted in HPI, all other systems reviewed and negative     Objective     Blood pressure 132/68, temperature 97.9 °F (36.6 °C), temperature source Oral, resp. rate 19, height 177.8 cm (70\"), weight 80.3 kg (177 lb 1 oz), SpO2 95 %.    Physical Exam   Constitutional: Pt is oriented to person, place, and time and well-developed, well-nourished, and in no distress.   Mouth/Throat: Oropharynx is clear and moist.   Neck: Normal range of motion.   Cardiovascular: Normal rate, regular rhythm and normal heart sounds.    Pulmonary/Chest: Effort normal and breath sounds normal.   Abdominal: Soft. Nontender  Skin: Skin is warm and dry.   Psychiatric: Mood, memory, affect and judgment normal.     Assessment/Plan     Diagnosis:  Polyps    Anticipated Surgical Procedure:  Colonoscopy    The risks, benefits, and alternatives of this procedure have been discussed with the patient or the responsible party- the patient understands and agrees to proceed.                                                          "

## 2018-08-10 NOTE — ANESTHESIA POSTPROCEDURE EVALUATION
"Patient: Quincy Roberts    Procedure Summary     Date:  08/10/18 Room / Location:  [facility] ENDOSCOPY 10 /  [facility]    Anesthesia Start:  0809 Anesthesia Stop:  0834    Procedure:  COLONOSCOPY INTO CECUM AND TERMINAL ILEUM (N/A ) Diagnosis:       Diverticulosis      (Hx of adenomatous colonic polyps [Z86.010])    Surgeon:  Valentino Stern MD Provider:  Tracey Johnson MD    Anesthesia Type:  MAC ASA Status:  2          Anesthesia Type: MAC  Last vitals  BP   132/69 (08/10/18 0854)   Temp   36.6 °C (97.9 °F) (08/10/18 0724)   Pulse   (!) 46 (08/10/18 0854)   Resp   16 (08/10/18 0854)     SpO2   97 % (08/10/18 0854)     Post Anesthesia Care and Evaluation    Patient location during evaluation: bedside  Patient participation: complete - patient participated  Level of consciousness: awake  Pain score: 0  Pain management: adequate  Airway patency: patent  Anesthetic complications: No anesthetic complications    Cardiovascular status: acceptable  Respiratory status: acceptable  Hydration status: acceptable    Comments: Blood pressure 132/69, pulse (!) 46, temperature 36.6 °C (97.9 °F), temperature source Oral, resp. rate 16, height 177.8 cm (70\"), weight 80.3 kg (177 lb 1 oz), SpO2 97 %.    No anesthesia care post op    "

## 2018-11-02 ENCOUNTER — OFFICE VISIT CONVERTED (OUTPATIENT)
Dept: FAMILY MEDICINE CLINIC | Age: 78
End: 2018-11-02
Attending: FAMILY MEDICINE

## 2018-11-21 ENCOUNTER — OFFICE VISIT (OUTPATIENT)
Dept: CARDIOLOGY | Facility: CLINIC | Age: 78
End: 2018-11-21

## 2018-11-21 VITALS
BODY MASS INDEX: 27 KG/M2 | DIASTOLIC BLOOD PRESSURE: 62 MMHG | WEIGHT: 188.6 LBS | HEART RATE: 53 BPM | HEIGHT: 70 IN | SYSTOLIC BLOOD PRESSURE: 140 MMHG

## 2018-11-21 DIAGNOSIS — I10 ESSENTIAL HYPERTENSION: Primary | ICD-10-CM

## 2018-11-21 PROCEDURE — 93000 ELECTROCARDIOGRAM COMPLETE: CPT | Performed by: INTERNAL MEDICINE

## 2018-11-21 PROCEDURE — 99214 OFFICE O/P EST MOD 30 MIN: CPT | Performed by: INTERNAL MEDICINE

## 2018-11-21 RX ORDER — FLUTICASONE PROPIONATE 50 MCG
2 SPRAY, SUSPENSION (ML) NASAL AS NEEDED
COMMUNITY

## 2018-11-21 RX ORDER — DOXAZOSIN MESYLATE 4 MG/1
4 TABLET ORAL NIGHTLY
Status: ON HOLD | COMMUNITY
End: 2019-10-05

## 2018-11-21 NOTE — PROGRESS NOTES
Date of Office Visit: 2018  Encounter Provider: Elmira Cabezas MD  Place of Service: [facility] CARDIOLOGY  Patient Name: Quincy Roberts  :1940      Patient ID:  Quincy Roberts is a 78 y.o. male is here for  followup for hypertension.         History of Present Illness    I first saw in the chest pain unit at  [facility].  He presented there with exertional chest pain, short-windedness,  and fatigue.  He went to the emergency department and his blood pressure was very high at  about 200/120.  He had not had that issue in quite some time.  Because of his chest pain,  we did an ischemic workup.  He ruled out for a myocardial infarction and had a stress  nuclear perfusion study done on January 10, 2013, which showed no ischemia.  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  During his hospitalization, he also had a lipid panel done on  January 10, 2013, which showed triglycerides of 59, HDL of 31, LDL of 82, and total  cholesterol of 125.     He did have some left upper quadrant pain and for  that he saw Dr. Stern.  He subsequently had an EGD.  He also had a CT of his abdomen  and pelvis.  The EGD looked fine.  The CT of the abdomen and pelvis suggested diverticular  disease.  He then had a colonoscopy performed and 7 polyps were removed, and I think they  have been treating him for the diverticular disease. His last EGD was 2017 and was normal.         He was diagnosed with prostate cancer in 2015. He sees Dr. Garcia  at [facility]. He underwent radiation therapy for that and it has helped, not only the  prostatic hypertrophy, but the prostate cancer.      He's been having some joint pain and neck pain.  He has no tachycardia, dizziness or syncope.  He's had no chest pain or pressure.  His orthopnea or PND.  He has noticed with activity.  He's taking his medications  as directed.  He continues to farm cattle full time.  He checks his blood pressure at home.  It's 120-130s over 80s.  He is overall feeling well.    Past Medical History:   Diagnosis Date   • GERD (gastroesophageal reflux disease)    • Health care maintenance    • History of colon polyps    • History of prostate cancer 2014   • History of radiation therapy    • Hyperlipidemia    • Hypertension    • Osteoarthritis    • Prostate cancer (CMS/HCC)          Past Surgical History:   Procedure Laterality Date   • HERNIA REPAIR  2010   • ROTATOR CUFF REPAIR Left    • TOTAL HIP ARTHROPLASTY Right        Current Outpatient Medications on File Prior to Visit   Medication Sig Dispense Refill   • Acetaminophen (TYLENOL PO) Take 1 tablet by mouth As Needed.     • amLODIPine (NORVASC) 5 MG tablet Take 5 mg by mouth Every Morning.     • aspirin 81 MG tablet Take 81 mg by mouth Daily.     • Cholecalciferol (VITAMIN D3 PO) Take 1,000 mg by mouth Daily.     • doxazosin (CARDURA) 4 MG tablet Take 4 mg by mouth Every Night.     • esomeprazole (NEXIUM) 40 MG capsule Take 40 mg by mouth Every Morning Before Breakfast.     • fluticasone (FLONASE) 50 MCG/ACT nasal spray 2 sprays into the nostril(s) as directed by provider Daily.     • ibuprofen (ADVIL,MOTRIN) 800 MG tablet Take 800 mg by mouth As Needed.     • Lactobacillus (PROBIOTIC ACIDOPHILUS PO) Take 1 tablet by mouth Daily.     • niacin 500 MG tablet Take 250 mg by mouth Daily.     • Omega-3 Fatty Acids (FISH OIL) 1000 MG capsule capsule Take 1,000 mg by mouth Daily With Breakfast.       No current facility-administered medications on file prior to visit.        Social History     Socioeconomic History   • Marital status:      Spouse name: Not on file   • Number of children: Not on file   • Years of education: Not on file   • Highest education level: Not on file   Social Needs   • Financial resource strain: Not on file   • Food insecurity - worry: Not on file   • Food  "insecurity - inability: Not on file   • Transportation needs - medical: Not on file   • Transportation needs - non-medical: Not on file   Occupational History   • Not on file   Tobacco Use   • Smoking status: Never Smoker   • Smokeless tobacco: Never Used   • Tobacco comment: caffine use   Substance and Sexual Activity   • Alcohol use: No   • Drug use: No   • Sexual activity: Defer   Other Topics Concern   • Not on file   Social History Narrative   • Not on file           Review of Systems   Constitution: Negative.   HENT: Negative for congestion.    Eyes: Negative for vision loss in left eye and vision loss in right eye.   Respiratory: Negative.  Negative for cough, hemoptysis, shortness of breath, sleep disturbances due to breathing, snoring, sputum production and wheezing.    Endocrine: Negative.    Hematologic/Lymphatic: Negative.    Skin: Negative for poor wound healing and rash.   Musculoskeletal: Positive for joint pain. Negative for falls, gout, muscle cramps and myalgias.   Gastrointestinal: Negative for abdominal pain, diarrhea, dysphagia, hematemesis, melena, nausea and vomiting.   Neurological: Negative for excessive daytime sleepiness, dizziness, headaches, light-headedness, loss of balance, seizures and vertigo.   Psychiatric/Behavioral: Negative for depression and substance abuse. The patient is not nervous/anxious.        Procedures    ECG 12 Lead  Date/Time: 11/21/2018 11:05 AM  Performed by: Elmira Cabezas MD  Authorized by: Elmira Cabezas MD   Comparison: compared with previous ECG   Similar to previous ECG  Rhythm: sinus rhythm  Clinical impression: normal ECG                Objective:      Vitals:    11/21/18 1059   BP: 140/62   BP Location: Left arm   Patient Position: Sitting   Pulse: 53   Weight: 85.5 kg (188 lb 9.6 oz)   Height: 177.8 cm (70\")     Body mass index is 27.06 kg/m².    Physical Exam   Constitutional: He is oriented to person, place, and time. He appears " well-developed and well-nourished. No distress.   HENT:   Head: Normocephalic and atraumatic.   Eyes: Conjunctivae are normal. No scleral icterus.   Neck: Neck supple. No JVD present. Carotid bruit is not present. No thyromegaly present.   Cardiovascular: Normal rate, regular rhythm, S1 normal, S2 normal, normal heart sounds and intact distal pulses.  No extrasystoles are present. PMI is not displaced. Exam reveals no gallop.   No murmur heard.  Pulses:       Carotid pulses are 2+ on the right side, and 2+ on the left side.       Radial pulses are 2+ on the right side, and 2+ on the left side.        Dorsalis pedis pulses are 2+ on the right side, and 2+ on the left side.        Posterior tibial pulses are 2+ on the right side, and 2+ on the left side.   Pulmonary/Chest: Effort normal and breath sounds normal. No respiratory distress. He has no wheezes. He has no rhonchi. He has no rales. He exhibits no tenderness.   Abdominal: Soft. Bowel sounds are normal. He exhibits no distension, no abdominal bruit and no mass. There is no tenderness.   Musculoskeletal: He exhibits no edema or deformity.   Lymphadenopathy:     He has no cervical adenopathy.   Neurological: He is alert and oriented to person, place, and time. No cranial nerve deficit.   Skin: Skin is warm and dry. No rash noted. He is not diaphoretic. No cyanosis. No pallor. Nails show no clubbing.   Psychiatric: He has a normal mood and affect. Judgment normal.   Vitals reviewed.      Lab Review:       Assessment:      Diagnosis Plan   1. Essential hypertension       1. Non-cardiac chest pain. Normal stress nuclear perfusion study done January 2013.  2. Hypertension, well controlled. Could not tolerate coreg.  Is on amlodipine.   3. History of renal cyst, stable.  4. Hyperlipidemia in the form of low HDL.   5. 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   6. Stage 1 prostate cancer 2/2015, s/p radiation with Dr. Burns.       Plan:        No changes, f/u in 1 year with gely.

## 2019-01-08 ENCOUNTER — HOSPITAL ENCOUNTER (OUTPATIENT)
Dept: OTHER | Facility: HOSPITAL | Age: 79
Discharge: HOME | End: 2019-01-08
Attending: FAMILY MEDICINE

## 2019-01-08 LAB
BASOPHILS # BLD AUTO: 0.05 10*3/UL (ref 0–0.2)
BASOPHILS NFR BLD AUTO: 1.32 % (ref 0–3)
EOSINOPHIL # BLD AUTO: 0.07 10*3/UL (ref 0–0.7)
EOSINOPHIL # BLD AUTO: 1.66 % (ref 0–7)
ERYTHROCYTE [DISTWIDTH] IN BLOOD BY AUTOMATED COUNT: 12.7 % (ref 11.5–14.5)
HBA1C MFR BLD: 11.9 G/DL (ref 14–18)
HCT VFR BLD AUTO: 33.2 % (ref 42–52)
LYMPHOCYTES # BLD AUTO: 1.49 10*3/UL (ref 1–5)
MCH RBC QN AUTO: 37.1 PG (ref 27–31)
MCHC RBC AUTO-ENTMCNC: 35.9 G/DL (ref 33–37)
MCV RBC AUTO: 103 FL (ref 80–96)
MONOCYTES # BLD AUTO: 0.47 10*3/UL (ref 0.2–1.2)
MONOCYTES NFR BLD AUTO: 11.9 % (ref 3–10)
NEUTROPHILS # BLD AUTO: 1.87 10*3/UL (ref 2–8)
NEUTROPHILS NFR BLD AUTO: 47.4 % (ref 30–85)
NRBC BLD AUTO-RTO: 0 % (ref 0–0.01)
PLATELET # BLD AUTO: 207 10*3/UL (ref 130–400)
PMV BLD AUTO: 7.5 FL (ref 7.4–10.4)
RBC # BLD AUTO: 3.21 10*6/UL (ref 4.7–6.1)
RETICS # AUTO: 89.7 10*3/UL
RETICS/RBC NFR AUTO: 2.79 % (ref 0.51–1.81)
VARIANT LYMPHS NFR BLD MANUAL: 37.8 % (ref 20–45)
WBC # BLD AUTO: 3.95 10*3/UL (ref 4.8–10.8)

## 2019-02-08 ENCOUNTER — CONSULT (OUTPATIENT)
Dept: ONCOLOGY | Facility: CLINIC | Age: 79
End: 2019-02-08

## 2019-02-08 ENCOUNTER — LAB (OUTPATIENT)
Dept: OTHER | Facility: HOSPITAL | Age: 79
End: 2019-02-08

## 2019-02-08 VITALS
WEIGHT: 193.9 LBS | OXYGEN SATURATION: 98 % | BODY MASS INDEX: 27.76 KG/M2 | DIASTOLIC BLOOD PRESSURE: 87 MMHG | HEIGHT: 70 IN | HEART RATE: 63 BPM | RESPIRATION RATE: 16 BRPM | TEMPERATURE: 98 F | SYSTOLIC BLOOD PRESSURE: 176 MMHG

## 2019-02-08 DIAGNOSIS — D64.9 ANEMIA, UNSPECIFIED TYPE: Primary | ICD-10-CM

## 2019-02-08 DIAGNOSIS — D53.9 MACROCYTIC ANEMIA: Primary | ICD-10-CM

## 2019-02-08 LAB
ALBUMIN SERPL-MCNC: 4.7 G/DL (ref 3.5–5.2)
ALBUMIN/GLOB SERPL: 1.7 G/DL
ALP SERPL-CCNC: 75 U/L (ref 39–117)
ALT SERPL W P-5'-P-CCNC: 19 U/L (ref 1–41)
ANION GAP SERPL CALCULATED.3IONS-SCNC: 10.1 MMOL/L
AST SERPL-CCNC: 21 U/L (ref 1–40)
BASOPHILS # BLD AUTO: 0.06 10*3/MM3 (ref 0–0.2)
BASOPHILS NFR BLD AUTO: 1.4 % (ref 0–1.5)
BILIRUB SERPL-MCNC: 0.5 MG/DL (ref 0.1–1.2)
BUN BLD-MCNC: 19 MG/DL (ref 8–23)
BUN/CREAT SERPL: 18.3 (ref 7–25)
CALCIUM SPEC-SCNC: 9.4 MG/DL (ref 8.6–10.5)
CHLORIDE SERPL-SCNC: 104 MMOL/L (ref 98–107)
CO2 SERPL-SCNC: 25.9 MMOL/L (ref 22–29)
CREAT BLD-MCNC: 1.04 MG/DL (ref 0.76–1.27)
DEPRECATED RDW RBC AUTO: 48.8 FL (ref 37–54)
EOSINOPHIL # BLD AUTO: 0.11 10*3/MM3 (ref 0–0.7)
EOSINOPHIL NFR BLD AUTO: 2.5 % (ref 0.3–6.2)
ERYTHROCYTE [DISTWIDTH] IN BLOOD BY AUTOMATED COUNT: 13.2 % (ref 11.5–14.5)
FERRITIN SERPL-MCNC: 199.1 NG/ML (ref 30–400)
GFR SERPL CREATININE-BSD FRML MDRD: 69 ML/MIN/1.73
GLOBULIN UR ELPH-MCNC: 2.8 GM/DL
GLUCOSE BLD-MCNC: 121 MG/DL (ref 65–99)
HCT VFR BLD AUTO: 36.2 % (ref 40.4–52.2)
HGB BLD-MCNC: 12.7 G/DL (ref 13.7–17.6)
HGB RETIC QN AUTO: 37.2 PG (ref 32.7–38.6)
IMM GRANULOCYTES # BLD AUTO: 0.03 10*3/MM3 (ref 0–0.03)
IMM GRANULOCYTES NFR BLD AUTO: 0.7 % (ref 0–0.5)
IMM RETICS NFR: 16.7 % (ref 0.7–13.7)
IRON 24H UR-MRATE: 64 MCG/DL (ref 59–158)
IRON SATN MFR SERPL: 22 % (ref 20–50)
LDH SERPL-CCNC: 168 U/L (ref 135–225)
LYMPHOCYTES # BLD AUTO: 1.52 10*3/MM3 (ref 0.9–4.8)
LYMPHOCYTES NFR BLD AUTO: 35 % (ref 19.6–45.3)
MCH RBC QN AUTO: 35.3 PG (ref 27–32.7)
MCHC RBC AUTO-ENTMCNC: 35.1 G/DL (ref 32.6–36.4)
MCV RBC AUTO: 100.6 FL (ref 79.8–96.2)
MONOCYTES # BLD AUTO: 0.46 10*3/MM3 (ref 0.2–1.2)
MONOCYTES NFR BLD AUTO: 10.6 % (ref 5–12)
NEUTROPHILS # BLD AUTO: 2.16 10*3/MM3 (ref 1.9–8.1)
NEUTROPHILS NFR BLD AUTO: 49.8 % (ref 42.7–76)
NRBC BLD AUTO-RTO: 0 /100 WBC (ref 0–0)
PLATELET # BLD AUTO: 181 10*3/MM3 (ref 140–500)
PMV BLD AUTO: 9.9 FL (ref 6–12)
POTASSIUM BLD-SCNC: 4.4 MMOL/L (ref 3.5–5.2)
PROT SERPL-MCNC: 7.5 G/DL (ref 6–8.5)
RBC # BLD AUTO: 3.6 10*6/MM3 (ref 4.6–6)
RETICS/RBC NFR AUTO: 2.47 % (ref 0.5–1.5)
SODIUM BLD-SCNC: 140 MMOL/L (ref 136–145)
TIBC SERPL-MCNC: 285 MCG/DL (ref 298–536)
TRANSFERRIN SERPL-MCNC: 191 MG/DL (ref 200–360)
VIT B12 BLD-MCNC: 1789 PG/ML (ref 211–946)
WBC NRBC COR # BLD: 4.34 10*3/MM3 (ref 4.5–10.7)

## 2019-02-08 PROCEDURE — 36415 COLL VENOUS BLD VENIPUNCTURE: CPT | Performed by: INTERNAL MEDICINE

## 2019-02-08 PROCEDURE — 84466 ASSAY OF TRANSFERRIN: CPT | Performed by: INTERNAL MEDICINE

## 2019-02-08 PROCEDURE — 82784 ASSAY IGA/IGD/IGG/IGM EACH: CPT | Performed by: INTERNAL MEDICINE

## 2019-02-08 PROCEDURE — 85046 RETICYTE/HGB CONCENTRATE: CPT | Performed by: INTERNAL MEDICINE

## 2019-02-08 PROCEDURE — 84165 PROTEIN E-PHORESIS SERUM: CPT | Performed by: INTERNAL MEDICINE

## 2019-02-08 PROCEDURE — 80053 COMPREHEN METABOLIC PANEL: CPT | Performed by: INTERNAL MEDICINE

## 2019-02-08 PROCEDURE — 86334 IMMUNOFIX E-PHORESIS SERUM: CPT | Performed by: INTERNAL MEDICINE

## 2019-02-08 PROCEDURE — 82607 VITAMIN B-12: CPT | Performed by: INTERNAL MEDICINE

## 2019-02-08 PROCEDURE — 82728 ASSAY OF FERRITIN: CPT | Performed by: INTERNAL MEDICINE

## 2019-02-08 PROCEDURE — 82747 ASSAY OF FOLIC ACID RBC: CPT | Performed by: INTERNAL MEDICINE

## 2019-02-08 PROCEDURE — 85014 HEMATOCRIT: CPT | Performed by: INTERNAL MEDICINE

## 2019-02-08 PROCEDURE — 83540 ASSAY OF IRON: CPT | Performed by: INTERNAL MEDICINE

## 2019-02-08 PROCEDURE — 83615 LACTATE (LD) (LDH) ENZYME: CPT | Performed by: INTERNAL MEDICINE

## 2019-02-08 PROCEDURE — 85025 COMPLETE CBC W/AUTO DIFF WBC: CPT | Performed by: INTERNAL MEDICINE

## 2019-02-08 PROCEDURE — 99205 OFFICE O/P NEW HI 60 MIN: CPT | Performed by: INTERNAL MEDICINE

## 2019-02-08 RX ORDER — UBIDECARENONE 50 MG
50 CAPSULE ORAL DAILY
COMMUNITY

## 2019-02-08 NOTE — PROGRESS NOTES
REFERRING PROVIDER:    Jose Valle MD  [address] [facility] [address]  [address]  [place] [address]    REASON FOR CONSULTATION:    Macrocytic anemia    HISTORY OF PRESENT ILLNESS:  Quincy Roberts is a 78 y.o. male who is referred today for further evaluation of macrocytic anemia.    He had labs on 11/2/2018 showing a white blood cell count 3.52 with hemoglobin 11.9, .5, and platelets 175,000.  Creatinine was normal at 1.05  with a calcium of 9.3.  His reticulocyte count was 2.59%.  Folic acid was greater than 20 and vitamin B12 greater than 2000.  He is on a vitamin B12 supplement.  Follow-up labs on 1/8/2019 showed hemoglobin 11.9 with hematocrit 33.2%, , platelets 207,000, white blood cell count 3.95.  He was referred for further evaluation.    He denies any history of hematologic disorders.  He denies any bleeding.  He states that he has frequent colonoscopies due to a history of polyps.  He generally feels well.    He has a history of prostate cancer status post radiation around 2005 and a history of right hip replacement in January 2011.    Past Medical History:   Diagnosis Date   • Anemia    • Diverticulosis    • GERD (gastroesophageal reflux disease)    • Health care maintenance    • History of colon polyps    • History of jaundice    • History of prostate cancer 2014   • History of radiation therapy    • Hyperlipidemia    • Hypertension    • Kidney stones    • Osteoarthritis    • Prostate cancer (CMS/HCC) 2014       Past Surgical History:   Procedure Laterality Date   • COLONOSCOPY N/A 8/10/2018    Procedure: COLONOSCOPY INTO CECUM AND TERMINAL ILEUM;  Surgeon: Valentino Stern MD;  Location: [facility] ENDOSCOPY;  Service: Gastroenterology   • ENDOSCOPY N/A 8/2/2017    Procedure: ESOPHAGOGASTRODUODENOSCOPY WITH COLD BIOPSIES;  Surgeon: Valentino PEÑA MD;  Location: [facility] ENDOSCOPY;  Service:    • HERNIA REPAIR  2010   • ROTATOR CUFF REPAIR Left 2003   • TOTAL HIP ARTHROPLASTY  Right 2011       SOCIAL HISTORY:   reports that  has never smoked. he has never used smokeless tobacco. He reports that he does not drink alcohol or use drugs.  He is dairy/.    FAMILY HISTORY:  family history includes Breast cancer in his mother.    ALLERGIES:  Allergies   Allergen Reactions   • Metoclopramide Anaphylaxis       MEDICATIONS:  The medication list has been reviewed with the patient by the medical assistant, and the list has been updated in the electronic medical record, which I reviewed.  Medication dosages and frequencies were confirmed to be accurate.    Review of Systems   Constitutional: Negative for appetite change, chills, fatigue, fever and unexpected weight change.   HENT: Negative for congestion, dental problem, ear pain, hearing loss, mouth sores, nosebleeds, postnasal drip, rhinorrhea, tinnitus and trouble swallowing.    Eyes: Negative.    Respiratory: Negative for cough, chest tightness, shortness of breath, wheezing and stridor.    Cardiovascular: Negative for chest pain, palpitations and leg swelling.   Gastrointestinal: Negative for abdominal distention, abdominal pain, blood in stool, constipation, diarrhea, nausea and vomiting.   Endocrine: Negative.    Genitourinary: Positive for frequency. Negative for decreased urine volume, difficulty urinating, dysuria, flank pain, hematuria, scrotal swelling, testicular pain and urgency.   Musculoskeletal: Negative for arthralgias, back pain and joint swelling.   Allergic/Immunologic: Negative.    Neurological: Negative for dizziness, seizures, syncope, weakness, light-headedness, numbness and headaches.   Hematological: Negative for adenopathy. Does not bruise/bleed easily.   Psychiatric/Behavioral: Negative for confusion and sleep disturbance. The patient is not nervous/anxious.    All other systems reviewed and are negative.      Vitals:    02/08/19 1333   BP: 176/87   Pulse: 63   Resp: 16   Temp: 98 °F (36.7 °C)   TempSrc: Oral  "  SpO2: 98%   Weight: 88 kg (193 lb 14.4 oz)   Height: 177.8 cm (70\")   PainSc: 0-No pain  Comment: anemia       Physical Exam   Constitutional: He is oriented to person, place, and time. He appears well-developed and well-nourished. No distress.   HENT:   Head: Normocephalic and atraumatic. Hair is normal.   Mouth/Throat: Oropharynx is clear and moist.   Eyes: Conjunctivae, EOM and lids are normal. Pupils are equal.   Neck: Neck supple. No JVD present. No thyroid mass and no thyromegaly present.   Cardiovascular: Normal rate and regular rhythm. Exam reveals no gallop and no friction rub.   No murmur heard.  Pulmonary/Chest: Effort normal and breath sounds normal. No respiratory distress. He has no wheezes. He has no rales.   Abdominal: Soft. He exhibits no distension and no mass. There is no splenomegaly or hepatomegaly. There is no tenderness. There is no guarding.   Musculoskeletal: Normal range of motion. He exhibits no edema, tenderness or deformity.   Lymphadenopathy:     He has no cervical adenopathy.     He has no axillary adenopathy.        Right: No inguinal and no supraclavicular adenopathy present.        Left: No inguinal and no supraclavicular adenopathy present.   Neurological: He is alert and oriented to person, place, and time. He has normal strength.   Skin: Skin is warm and dry. No rash noted.   Psychiatric: He has a normal mood and affect. His behavior is normal. Judgment and thought content normal. Cognition and memory are normal.   Nursing note and vitals reviewed.      DIAGNOSTIC DATA:  Results for orders placed or performed in visit on 02/08/19   CBC Auto Differential   Result Value Ref Range    WBC 4.34 (L) 4.50 - 10.70 10*3/mm3    RBC 3.60 (L) 4.60 - 6.00 10*6/mm3    Hemoglobin 12.7 (L) 13.7 - 17.6 g/dL    Hematocrit 36.2 (L) 40.4 - 52.2 %    .6 (H) 79.8 - 96.2 fL    MCH 35.3 (H) 27.0 - 32.7 pg    MCHC 35.1 32.6 - 36.4 g/dL    RDW 13.2 11.5 - 14.5 %    RDW-SD 48.8 37.0 - 54.0 fl    " MPV 9.9 6.0 - 12.0 fL    Platelets 181 140 - 500 10*3/mm3    Neutrophil % 49.8 42.7 - 76.0 %    Lymphocyte % 35.0 19.6 - 45.3 %    Monocyte % 10.6 5.0 - 12.0 %    Eosinophil % 2.5 0.3 - 6.2 %    Basophil % 1.4 0.0 - 1.5 %    Immature Grans % 0.7 (H) 0.0 - 0.5 %    Neutrophils, Absolute 2.16 1.90 - 8.10 10*3/mm3    Lymphocytes, Absolute 1.52 0.90 - 4.80 10*3/mm3    Monocytes, Absolute 0.46 0.20 - 1.20 10*3/mm3    Eosinophils, Absolute 0.11 0.00 - 0.70 10*3/mm3    Basophils, Absolute 0.06 0.00 - 0.20 10*3/mm3    Immature Grans, Absolute 0.03 0.00 - 0.03 10*3/mm3    nRBC 0.0 0.0 - 0.0 /100 WBC       IMAGING:    I personally reviewed his peripheral blood smear.  Red cells appear essentially normal without schistocytes or significant anisocytosis and poikilocytosis.  White blood cells appear normal in maturation and distribution.  Platelets are adequate in number and normal in morphology.    ASSESSMENT:  This is a 78 y.o. male with:  1.  Macrocytic anemia: This is mild.  Vitamin B12 and folic acid levels were normal.  He denies alcohol consumption.  He might have myelodysplasia.  We will screen also for plasma cell dyscrasia although his calcium and creatinine are normal.      2.  Leukopenia: This is very mild.  We will continue to monitor.  His differential appears normal.  Again, myelodysplasia is possible.    3.  History of prostate cancer status post radiation    PLAN:   1.  Labs today including a reticulocyte count, ferritin, iron profile, RBC folate level, vitamin B12 level, CMP, LDH, serum protein electrophoresis immunofixation.    2.  Return in a couple of months for follow-up and I will let him know if there are any labs require more immediate attention.

## 2019-02-11 LAB
ALBUMIN SERPL-MCNC: 4.6 G/DL (ref 2.9–4.4)
ALBUMIN/GLOB SERPL: 1.6 {RATIO} (ref 0.7–1.7)
ALPHA1 GLOB FLD ELPH-MCNC: 0.2 G/DL (ref 0–0.4)
ALPHA2 GLOB SERPL ELPH-MCNC: 0.5 G/DL (ref 0.4–1)
B-GLOBULIN SERPL ELPH-MCNC: 0.8 G/DL (ref 0.7–1.3)
FOLATE BLD-MCNC: >620 NG/ML
FOLATE RBC-MCNC: >1594 NG/ML
GAMMA GLOB SERPL ELPH-MCNC: 1.2 G/DL (ref 0.4–1.8)
GLOBULIN SER CALC-MCNC: 2.8 G/DL (ref 2.2–3.9)
HCT VFR BLD AUTO: 38.9 % (ref 37.5–51)
IGA SERPL-MCNC: 324 MG/DL (ref 61–437)
IGG SERPL-MCNC: 1146 MG/DL (ref 700–1600)
IGM SERPL-MCNC: 125 MG/DL (ref 15–143)
Lab: ABNORMAL
M-SPIKE: ABNORMAL G/DL
PROT PATTERN SERPL IFE-IMP: NORMAL
PROT SERPL-MCNC: 7.4 G/DL (ref 6–8.5)

## 2019-05-03 ENCOUNTER — OFFICE VISIT (OUTPATIENT)
Dept: ONCOLOGY | Facility: CLINIC | Age: 79
End: 2019-05-03

## 2019-05-03 ENCOUNTER — LAB (OUTPATIENT)
Dept: OTHER | Facility: HOSPITAL | Age: 79
End: 2019-05-03

## 2019-05-03 VITALS
OXYGEN SATURATION: 96 % | DIASTOLIC BLOOD PRESSURE: 71 MMHG | BODY MASS INDEX: 28.1 KG/M2 | SYSTOLIC BLOOD PRESSURE: 176 MMHG | HEIGHT: 70 IN | HEART RATE: 60 BPM | RESPIRATION RATE: 16 BRPM | TEMPERATURE: 98.3 F | WEIGHT: 196.3 LBS

## 2019-05-03 DIAGNOSIS — D53.9 MACROCYTIC ANEMIA: ICD-10-CM

## 2019-05-03 DIAGNOSIS — D53.9 MACROCYTIC ANEMIA: Primary | ICD-10-CM

## 2019-05-03 LAB
BASOPHILS # BLD AUTO: 0.04 10*3/MM3 (ref 0–0.2)
BASOPHILS NFR BLD AUTO: 1.1 % (ref 0–1.5)
DEPRECATED RDW RBC AUTO: 48.7 FL (ref 37–54)
EOSINOPHIL # BLD AUTO: 0.09 10*3/MM3 (ref 0–0.4)
EOSINOPHIL NFR BLD AUTO: 2.4 % (ref 0.3–6.2)
ERYTHROCYTE [DISTWIDTH] IN BLOOD BY AUTOMATED COUNT: 13.3 % (ref 12.3–15.4)
HCT VFR BLD AUTO: 34.7 % (ref 37.5–51)
HGB BLD-MCNC: 12.3 G/DL (ref 13–17.7)
HGB RETIC QN AUTO: 36.2 PG (ref 29.8–36.1)
IMM GRANULOCYTES # BLD AUTO: 0.02 10*3/MM3 (ref 0–0.05)
IMM GRANULOCYTES NFR BLD AUTO: 0.5 % (ref 0–0.5)
IMM RETICS NFR: 19.9 % (ref 3–15.8)
LYMPHOCYTES # BLD AUTO: 1.4 10*3/MM3 (ref 0.7–3.1)
LYMPHOCYTES NFR BLD AUTO: 37.3 % (ref 19.6–45.3)
MCH RBC QN AUTO: 35.5 PG (ref 26.6–33)
MCHC RBC AUTO-ENTMCNC: 35.4 G/DL (ref 31.5–35.7)
MCV RBC AUTO: 100.3 FL (ref 79–97)
MONOCYTES # BLD AUTO: 0.42 10*3/MM3 (ref 0.1–0.9)
MONOCYTES NFR BLD AUTO: 11.2 % (ref 5–12)
NEUTROPHILS # BLD AUTO: 1.78 10*3/MM3 (ref 1.7–7)
NEUTROPHILS NFR BLD AUTO: 47.5 % (ref 42.7–76)
NRBC BLD AUTO-RTO: 0 /100 WBC (ref 0–0.2)
PLATELET # BLD AUTO: 173 10*3/MM3 (ref 140–450)
PMV BLD AUTO: 10 FL (ref 6–12)
RBC # BLD AUTO: 3.46 10*6/MM3 (ref 4.14–5.8)
RETICS/RBC NFR AUTO: 2.59 % (ref 0.7–1.9)
WBC NRBC COR # BLD: 3.75 10*3/MM3 (ref 3.4–10.8)

## 2019-05-03 PROCEDURE — 99213 OFFICE O/P EST LOW 20 MIN: CPT | Performed by: INTERNAL MEDICINE

## 2019-05-03 PROCEDURE — 85046 RETICYTE/HGB CONCENTRATE: CPT | Performed by: INTERNAL MEDICINE

## 2019-05-03 PROCEDURE — 85025 COMPLETE CBC W/AUTO DIFF WBC: CPT | Performed by: INTERNAL MEDICINE

## 2019-05-03 RX ORDER — DOXAZOSIN 8 MG/1
TABLET ORAL
COMMUNITY
Start: 2019-05-02 | End: 2019-11-21

## 2019-05-03 NOTE — PROGRESS NOTES
[facility] GROUP OUTPATIENT FOLLOW UP CLINIC VISIT    REASON FOR FOLLOW-UP:    Normocytic anemia    HISTORY OF PRESENT ILLNESS:  Quincy Roberts is a 78 y.o. male who returns today for follow up of the above issue.      He continues to feel well.  No bleeding.  Energy level is adequate.        HEMATOLOGIC HISTORY:  He had labs on 11/2/2018 showing a white blood cell count 3.52 with hemoglobin 11.9, .5, and platelets 175,000.  Creatinine was normal at 1.05  with a calcium of 9.3.  His reticulocyte count was 2.59%.  Folic acid was greater than 20 and vitamin B12 greater than 2000.  He is on a vitamin B12 supplement.  Follow-up labs on 1/8/2019 showed hemoglobin 11.9 with hematocrit 33.2%, , platelets 207,000, white blood cell count 3.95.  He was referred for further evaluation.     He denies any history of hematologic disorders.  He denies any bleeding.  He states that he has frequent colonoscopies due to a history of polyps.  He generally feels well.     He has a history of prostate cancer status post radiation around 2005 and a history of right hip replacement in January 2011.    He was seen initially on 2/8/2019.  Labs were unremarkable.  Ferritin and iron profile were normal.  Folic acid and vitamin B12 levels were normal.  Creatinine was normal at 1.04.  LDH was normal.  Serum protein electrophoresis with immunofixation were unremarkable.        ALLERGIES:  Allergies   Allergen Reactions   • Metoclopramide Anaphylaxis       MEDICATIONS:  The medication list has been reviewed with the patient by the medical assistant, and the list has been updated in the electronic medical record, which I reviewed.  Medication dosages and frequencies were confirmed to be accurate.    REVIEW OF SYSTEMS:  PAIN:  See Vital Signs below.  GENERAL:  No fevers, chills, night sweats, or unintended weight loss.  SKIN:  No rashes or non-healing lesions.  HEME/LYMPH:  No abnormal bleeding.  No palpable  "lymphadenopathy.  EYES:  No vision changes or diplopia.  ENT:  No sore throat or difficulty swallowing.  RESPIRATORY:  No cough, shortness of breath, hemoptysis, or wheezing.  CARDIOVASCULAR:  No chest pain, palpitations, orthopnea, or dyspnea on exertion.  GASTROINTESTINAL:  No abdominal pain, nausea, vomiting, constipation, diarrhea, melena, or hematochezia.  GENITOURINARY:  No dysuria or hematuria.  Increased urinary frequency.  MUSCULOSKELETAL:  No joint pain, swelling, or erythema.  NEUROLOGIC:  No dizziness, loss of consciousness, or seizures.  PSYCHIATRIC:  No depression, anxiety, or mood changes.    Vitals:    05/03/19 1011   BP: 176/71   Pulse: 60   Resp: 16   Temp: 98.3 °F (36.8 °C)   TempSrc: Oral   SpO2: 96%   Weight: 89 kg (196 lb 4.8 oz)   Height: 177.8 cm (70\")   PainSc: 0-No pain  Comment: ANEMIA       PHYSICAL EXAMINATION:  GENERAL:  Well-developed well-nourished male; awake, alert and oriented, in no acute distress.  SKIN:  Warm and dry, without rashes, purpura, or petechiae.  HEAD:  Normocephalic, atraumatic.  EYES:  Pupils equal, round and reactive to light.  Extraocular movements intact.  Conjunctivae normal.  EARS:  Hearing intact.  NOSE:  Septum midline.  No excoriations or nasal discharge.  MOUTH:  No stomatitis or ulcers.  Lips are normal.  THROAT:  Oropharynx without lesions or exudates.  NECK:  Supple with good range of motion; no thyromegaly or masses; no JVD or bruits.  LYMPHATICS:  No cervical, supraclavicular, or axillary lymphadenopathy.  CHEST:  Lungs are clear to auscultation bilaterally.  No wheezes, rales, or rhonchi.  HEART:  Regular rate; normal rhythm.  No murmurs, gallops or rubs.  ABDOMEN:  Soft, non-tender, non-distended.  Normal active bowel sounds.  No organomegaly.  EXTREMITIES:  No clubbing cyanosis or edema.  NEUROLOGICAL:  No focal neurologic deficits.    DIAGNOSTIC DATA:  Results for orders placed or performed in visit on 05/03/19   Retic With IRF & RET-He   Result " Value Ref Range    Immature Reticulocyte Fraction 19.9 (H) 3.0 - 15.8 %    Reticulocyte % 2.59 (H) 0.70 - 1.90 %    Reticulocyte Hgb 36.2 (H) 29.8 - 36.1 pg   CBC Auto Differential   Result Value Ref Range    WBC 3.75 3.40 - 10.80 10*3/mm3    RBC 3.46 (L) 4.14 - 5.80 10*6/mm3    Hemoglobin 12.3 (L) 13.0 - 17.7 g/dL    Hematocrit 34.7 (L) 37.5 - 51.0 %    .3 (H) 79.0 - 97.0 fL    MCH 35.5 (H) 26.6 - 33.0 pg    MCHC 35.4 31.5 - 35.7 g/dL    RDW 13.3 12.3 - 15.4 %    RDW-SD 48.7 37.0 - 54.0 fl    MPV 10.0 6.0 - 12.0 fL    Platelets 173 140 - 450 10*3/mm3    Neutrophil % 47.5 42.7 - 76.0 %    Lymphocyte % 37.3 19.6 - 45.3 %    Monocyte % 11.2 5.0 - 12.0 %    Eosinophil % 2.4 0.3 - 6.2 %    Basophil % 1.1 0.0 - 1.5 %    Immature Grans % 0.5 0.0 - 0.5 %    Neutrophils, Absolute 1.78 1.70 - 7.00 10*3/mm3    Lymphocytes, Absolute 1.40 0.70 - 3.10 10*3/mm3    Monocytes, Absolute 0.42 0.10 - 0.90 10*3/mm3    Eosinophils, Absolute 0.09 0.00 - 0.40 10*3/mm3    Basophils, Absolute 0.04 0.00 - 0.20 10*3/mm3    Immature Grans, Absolute 0.02 0.00 - 0.05 10*3/mm3    nRBC 0.0 0.0 - 0.2 /100 WBC       IMAGING:  None reviewed    ASSESSMENT:  This is a 78 y.o. male with:  1.  Normocytic anemia: This has been mild.  I did not discover an etiology for this on laboratory evaluation.  We will continue to monitor.    2.  Leukopenia: This was mild and he really is not leukopenic today.    3.  History of prostate cancer status post radiation    PLAN:  1.  I will see him back in a few months for follow-up with a CBC and reticulocyte count

## 2019-07-01 ENCOUNTER — OFFICE VISIT CONVERTED (OUTPATIENT)
Dept: FAMILY MEDICINE CLINIC | Age: 79
End: 2019-07-01
Attending: FAMILY MEDICINE

## 2019-08-09 ENCOUNTER — OFFICE VISIT (OUTPATIENT)
Dept: ONCOLOGY | Facility: CLINIC | Age: 79
End: 2019-08-09

## 2019-08-09 ENCOUNTER — LAB (OUTPATIENT)
Dept: OTHER | Facility: HOSPITAL | Age: 79
End: 2019-08-09

## 2019-08-09 VITALS
BODY MASS INDEX: 27.94 KG/M2 | HEIGHT: 70 IN | SYSTOLIC BLOOD PRESSURE: 162 MMHG | HEART RATE: 55 BPM | TEMPERATURE: 98.4 F | RESPIRATION RATE: 16 BRPM | WEIGHT: 195.2 LBS | OXYGEN SATURATION: 96 % | DIASTOLIC BLOOD PRESSURE: 70 MMHG

## 2019-08-09 DIAGNOSIS — D53.9 MACROCYTIC ANEMIA: Primary | ICD-10-CM

## 2019-08-09 DIAGNOSIS — D64.9 ANEMIA, UNSPECIFIED TYPE: Primary | ICD-10-CM

## 2019-08-09 DIAGNOSIS — D53.9 MACROCYTIC ANEMIA: ICD-10-CM

## 2019-08-09 LAB
ANION GAP SERPL CALCULATED.3IONS-SCNC: 10.3 MMOL/L (ref 5–15)
BASOPHILS # BLD AUTO: 0.03 10*3/MM3 (ref 0–0.2)
BASOPHILS NFR BLD AUTO: 0.8 % (ref 0–1.5)
BUN BLD-MCNC: 21 MG/DL (ref 8–23)
BUN/CREAT SERPL: 18.1 (ref 7–25)
CALCIUM SPEC-SCNC: 9.2 MG/DL (ref 8.6–10.5)
CHLORIDE SERPL-SCNC: 106 MMOL/L (ref 98–107)
CO2 SERPL-SCNC: 25.7 MMOL/L (ref 22–29)
CREAT BLD-MCNC: 1.16 MG/DL (ref 0.76–1.27)
DEPRECATED RDW RBC AUTO: 54.2 FL (ref 37–54)
EOSINOPHIL # BLD AUTO: 0.07 10*3/MM3 (ref 0–0.4)
EOSINOPHIL NFR BLD AUTO: 1.8 % (ref 0.3–6.2)
ERYTHROCYTE [DISTWIDTH] IN BLOOD BY AUTOMATED COUNT: 14.3 % (ref 12.3–15.4)
GFR SERPL CREATININE-BSD FRML MDRD: 61 ML/MIN/1.73
GLUCOSE BLD-MCNC: 109 MG/DL (ref 65–99)
HCT VFR BLD AUTO: 35.7 % (ref 37.5–51)
HGB BLD-MCNC: 12.3 G/DL (ref 13–17.7)
HGB RETIC QN AUTO: 37.1 PG (ref 29.8–36.1)
IMM GRANULOCYTES # BLD AUTO: 0.01 10*3/MM3 (ref 0–0.05)
IMM GRANULOCYTES NFR BLD AUTO: 0.3 % (ref 0–0.5)
IMM RETICS NFR: 19.9 % (ref 3–15.8)
LYMPHOCYTES # BLD AUTO: 1.4 10*3/MM3 (ref 0.7–3.1)
LYMPHOCYTES NFR BLD AUTO: 36.6 % (ref 19.6–45.3)
MCH RBC QN AUTO: 35.8 PG (ref 26.6–33)
MCHC RBC AUTO-ENTMCNC: 34.5 G/DL (ref 31.5–35.7)
MCV RBC AUTO: 103.8 FL (ref 79–97)
MONOCYTES # BLD AUTO: 0.39 10*3/MM3 (ref 0.1–0.9)
MONOCYTES NFR BLD AUTO: 10.2 % (ref 5–12)
NEUTROPHILS # BLD AUTO: 1.92 10*3/MM3 (ref 1.7–7)
NEUTROPHILS NFR BLD AUTO: 50.3 % (ref 42.7–76)
NRBC BLD AUTO-RTO: 0 /100 WBC (ref 0–0.2)
PLATELET # BLD AUTO: 176 10*3/MM3 (ref 140–450)
PMV BLD AUTO: 9.5 FL (ref 6–12)
POTASSIUM BLD-SCNC: 4.2 MMOL/L (ref 3.5–5.2)
RBC # BLD AUTO: 3.44 10*6/MM3 (ref 4.14–5.8)
RETICS/RBC NFR AUTO: 3.03 % (ref 0.7–1.9)
SODIUM BLD-SCNC: 142 MMOL/L (ref 136–145)
WBC NRBC COR # BLD: 3.82 10*3/MM3 (ref 3.4–10.8)

## 2019-08-09 PROCEDURE — 99213 OFFICE O/P EST LOW 20 MIN: CPT | Performed by: INTERNAL MEDICINE

## 2019-08-09 PROCEDURE — 85025 COMPLETE CBC W/AUTO DIFF WBC: CPT | Performed by: INTERNAL MEDICINE

## 2019-08-09 PROCEDURE — 36415 COLL VENOUS BLD VENIPUNCTURE: CPT

## 2019-08-09 PROCEDURE — 85046 RETICYTE/HGB CONCENTRATE: CPT | Performed by: INTERNAL MEDICINE

## 2019-08-09 PROCEDURE — 80048 BASIC METABOLIC PNL TOTAL CA: CPT | Performed by: FAMILY MEDICINE

## 2019-08-09 NOTE — PROGRESS NOTES
[facility] GROUP OUTPATIENT FOLLOW UP CLINIC VISIT    REASON FOR FOLLOW-UP:    Normocytic anemia    HISTORY OF PRESENT ILLNESS:  Quincy Roberts is a 78 y.o. male who returns today for follow up of the above issue.      He continues to feel well.  No bleeding.  He does state that he had a small melanoma resected from the left side of his neck past couple of months.    HEMATOLOGIC HISTORY:  He had labs on 11/2/2018 showing a white blood cell count 3.52 with hemoglobin 11.9, .5, and platelets 175,000.  Creatinine was normal at 1.05  with a calcium of 9.3.  His reticulocyte count was 2.59%.  Folic acid was greater than 20 and vitamin B12 greater than 2000.  He is on a vitamin B12 supplement.  Follow-up labs on 1/8/2019 showed hemoglobin 11.9 with hematocrit 33.2%, , platelets 207,000, white blood cell count 3.95.  He was referred for further evaluation.     He denies any history of hematologic disorders.  He denies any bleeding.  He states that he has frequent colonoscopies due to a history of polyps.  He generally feels well.     He has a history of prostate cancer status post radiation around 2005 and a history of right hip replacement in January 2011.    He was seen initially on 2/8/2019.  Labs were unremarkable.  Ferritin and iron profile were normal.  Folic acid and vitamin B12 levels were normal.  Creatinine was normal at 1.04.  LDH was normal.  Serum protein electrophoresis with immunofixation were unremarkable.        ALLERGIES:  Allergies   Allergen Reactions   • Metoclopramide Anaphylaxis       MEDICATIONS:  The medication list has been reviewed with the patient by the medical assistant, and the list has been updated in the electronic medical record, which I reviewed.  Medication dosages and frequencies were confirmed to be accurate.    REVIEW OF SYSTEMS:  PAIN:  See Vital Signs below.  GENERAL:  No fevers, chills, night sweats, or unintended weight loss.  SKIN:  No rashes or non-healing  "lesions.  Healing incision on the left side of his neck.  HEME/LYMPH:  No abnormal bleeding.  No palpable lymphadenopathy.  EYES:  No vision changes or diplopia.  ENT:  No sore throat or difficulty swallowing.  RESPIRATORY:  No cough, shortness of breath, hemoptysis, or wheezing.  CARDIOVASCULAR:  No chest pain, palpitations, orthopnea, or dyspnea on exertion.  GASTROINTESTINAL:  No abdominal pain, nausea, vomiting, constipation, diarrhea, melena, or hematochezia.  GENITOURINARY:  No dysuria or hematuria.  Increased urinary frequency.  MUSCULOSKELETAL:  No joint pain, swelling, or erythema.  NEUROLOGIC:  No dizziness, loss of consciousness, or seizures.  PSYCHIATRIC:  No depression, anxiety, or mood changes.    Vitals:    08/09/19 0956   BP: 162/70   Pulse: 55   Resp: 16   Temp: 98.4 °F (36.9 °C)   TempSrc: Oral   SpO2: 96%   Weight: 88.5 kg (195 lb 3.2 oz)   Height: 177.8 cm (70\")   PainSc: 0-No pain  Comment: anemia       PHYSICAL EXAMINATION:  GENERAL:  Well-developed well-nourished male; awake, alert and oriented, in no acute distress.  SKIN:  Warm and dry, without rashes, purpura, or petechiae.  Healing incision on the left side of his neck.  HEAD:  Normocephalic, atraumatic.  EARS:  Hearing intact.  NOSE:  Septum midline.  No excoriations or nasal discharge.  MOUTH:  No stomatitis or ulcers.  Lips are normal.  THROAT:  Oropharynx without lesions or exudates.  NECK:  Supple with good range of motion; no thyromegaly or masses; no JVD or bruits.  LYMPHATICS:  No cervical, supraclavicular, or axillary lymphadenopathy.  CHEST:  Lungs are clear to auscultation bilaterally.  No wheezes, rales, or rhonchi.  HEART:  Regular rate; normal rhythm.  No murmurs, gallops or rubs.  EXTREMITIES:  No clubbing cyanosis or edema.  NEUROLOGICAL:  No focal neurologic deficits.    DIAGNOSTIC DATA:  Results for orders placed or performed in visit on 08/09/19   Retic With IRF & RET-He   Result Value Ref Range    Immature Reticulocyte " Fraction 19.9 (H) 3.0 - 15.8 %    Reticulocyte % 3.03 (H) 0.70 - 1.90 %    Reticulocyte Hgb 37.1 (H) 29.8 - 36.1 pg   CBC Auto Differential   Result Value Ref Range    WBC 3.82 3.40 - 10.80 10*3/mm3    RBC 3.44 (L) 4.14 - 5.80 10*6/mm3    Hemoglobin 12.3 (L) 13.0 - 17.7 g/dL    Hematocrit 35.7 (L) 37.5 - 51.0 %    .8 (H) 79.0 - 97.0 fL    MCH 35.8 (H) 26.6 - 33.0 pg    MCHC 34.5 31.5 - 35.7 g/dL    RDW 14.3 12.3 - 15.4 %    RDW-SD 54.2 (H) 37.0 - 54.0 fl    MPV 9.5 6.0 - 12.0 fL    Platelets 176 140 - 450 10*3/mm3    Neutrophil % 50.3 42.7 - 76.0 %    Lymphocyte % 36.6 19.6 - 45.3 %    Monocyte % 10.2 5.0 - 12.0 %    Eosinophil % 1.8 0.3 - 6.2 %    Basophil % 0.8 0.0 - 1.5 %    Immature Grans % 0.3 0.0 - 0.5 %    Neutrophils, Absolute 1.92 1.70 - 7.00 10*3/mm3    Lymphocytes, Absolute 1.40 0.70 - 3.10 10*3/mm3    Monocytes, Absolute 0.39 0.10 - 0.90 10*3/mm3    Eosinophils, Absolute 0.07 0.00 - 0.40 10*3/mm3    Basophils, Absolute 0.03 0.00 - 0.20 10*3/mm3    Immature Grans, Absolute 0.01 0.00 - 0.05 10*3/mm3    nRBC 0.0 0.0 - 0.2 /100 WBC       IMAGING:  None reviewed    ASSESSMENT:  This is a 78 y.o. male with:  1.  Normocytic to macrocytic anemia: This has been mild.  I did not discover an etiology for this on laboratory evaluation.  His reticulocyte count remains a little elevated.  We will continue to monitor.  Bone marrow biopsy if this worsens.      2.  Leukopenia: This was mild and he really is not leukopenic today.    3.  History of prostate cancer status post radiation    4.  Recent skin cancer resected from the left side of his neck.  He states this was a melanoma.  We have no records regarding this.      PLAN:  1.  I will see him back in 6 months for follow-up with a CBC and reticulocyte count

## 2019-10-05 ENCOUNTER — HOSPITAL ENCOUNTER (INPATIENT)
Facility: HOSPITAL | Age: 79
LOS: 1 days | Discharge: HOME | End: 2019-10-06
Attending: EMERGENCY MEDICINE | Admitting: INTERNAL MEDICINE

## 2019-10-05 ENCOUNTER — APPOINTMENT (OUTPATIENT)
Dept: GENERAL RADIOLOGY | Facility: HOSPITAL | Age: 79
End: 2019-10-05

## 2019-10-05 DIAGNOSIS — Z74.09 IMMOBILITY: ICD-10-CM

## 2019-10-05 DIAGNOSIS — M25.562 PAIN AND SWELLING OF LEFT KNEE: ICD-10-CM

## 2019-10-05 DIAGNOSIS — R50.9 FEVER AND CHILLS: Primary | ICD-10-CM

## 2019-10-05 DIAGNOSIS — M25.462 PAIN AND SWELLING OF LEFT KNEE: ICD-10-CM

## 2019-10-05 PROBLEM — M00.9 PYOGENIC ARTHRITIS OF LEFT KNEE JOINT: Status: ACTIVE | Noted: 2019-10-05

## 2019-10-05 LAB
ALBUMIN SERPL-MCNC: 4.4 G/DL (ref 3.5–5.2)
ALBUMIN/GLOB SERPL: 1.4 G/DL
ALP SERPL-CCNC: 77 U/L (ref 39–117)
ALT SERPL W P-5'-P-CCNC: 20 U/L (ref 1–41)
ANION GAP SERPL CALCULATED.3IONS-SCNC: 13.4 MMOL/L (ref 5–15)
APPEARANCE FLD: ABNORMAL
APPEARANCE FLD: ABNORMAL
AST SERPL-CCNC: 19 U/L (ref 1–40)
BASOPHILS # BLD MANUAL: 0.07 10*3/MM3 (ref 0–0.2)
BASOPHILS NFR BLD AUTO: 1 % (ref 0–1.5)
BILIRUB SERPL-MCNC: 1.2 MG/DL (ref 0.2–1.2)
BUN BLD-MCNC: 15 MG/DL (ref 8–23)
BUN/CREAT SERPL: 15.2 (ref 7–25)
CALCIUM SPEC-SCNC: 9.1 MG/DL (ref 8.6–10.5)
CHLORIDE SERPL-SCNC: 100 MMOL/L (ref 98–107)
CO2 SERPL-SCNC: 22.6 MMOL/L (ref 22–29)
COLOR FLD: ABNORMAL
COLOR FLD: ABNORMAL
CREAT BLD-MCNC: 0.99 MG/DL (ref 0.76–1.27)
CRP SERPL-MCNC: 3.85 MG/DL (ref 0–0.5)
CRYSTALS FLD MICRO: NORMAL
CRYSTALS FLD MICRO: NORMAL
D-LACTATE SERPL-SCNC: 1.2 MMOL/L (ref 0.5–2)
DEPRECATED RDW RBC AUTO: 55.3 FL (ref 37–54)
ERYTHROCYTE [DISTWIDTH] IN BLOOD BY AUTOMATED COUNT: 13.9 % (ref 12.3–15.4)
ERYTHROCYTE [SEDIMENTATION RATE] IN BLOOD: 24 MM/HR (ref 0–20)
GFR SERPL CREATININE-BSD FRML MDRD: 73 ML/MIN/1.73
GLOBULIN UR ELPH-MCNC: 3.2 GM/DL
GLUCOSE BLD-MCNC: 176 MG/DL (ref 65–99)
HCT VFR BLD AUTO: 36.8 % (ref 37.5–51)
HGB BLD-MCNC: 12.1 G/DL (ref 13–17.7)
LYMPHOCYTES # BLD MANUAL: 0.91 10*3/MM3 (ref 0.7–3.1)
LYMPHOCYTES NFR BLD MANUAL: 13 % (ref 19.6–45.3)
LYMPHOCYTES NFR BLD MANUAL: 5 % (ref 5–12)
LYMPHOCYTES NFR FLD MANUAL: 10 %
LYMPHOCYTES NFR FLD MANUAL: 6 %
MCH RBC QN AUTO: 34.9 PG (ref 26.6–33)
MCHC RBC AUTO-ENTMCNC: 32.9 G/DL (ref 31.5–35.7)
MCV RBC AUTO: 106.1 FL (ref 79–97)
MONOCYTES # BLD AUTO: 0.35 10*3/MM3 (ref 0.1–0.9)
MONOCYTES NFR FLD: 12 %
MONOCYTES NFR FLD: 5 %
NEUTROPHILS # BLD AUTO: 5.65 10*3/MM3 (ref 1.7–7)
NEUTROPHILS NFR BLD MANUAL: 81 % (ref 42.7–76)
NEUTROPHILS NFR FLD MANUAL: 82 %
NEUTROPHILS NFR FLD MANUAL: 85 %
PLAT MORPH BLD: NORMAL
PLATELET # BLD AUTO: 185 10*3/MM3 (ref 140–450)
PMV BLD AUTO: 10 FL (ref 6–12)
POTASSIUM BLD-SCNC: 3.7 MMOL/L (ref 3.5–5.2)
PROCALCITONIN SERPL-MCNC: 0.09 NG/ML (ref 0.1–0.25)
PROT SERPL-MCNC: 7.6 G/DL (ref 6–8.5)
RBC # BLD AUTO: 3.47 10*6/MM3 (ref 4.14–5.8)
RBC # FLD AUTO: ABNORMAL /MM3
RBC # FLD AUTO: ABNORMAL /MM3
RBC MORPH BLD: NORMAL
SODIUM BLD-SCNC: 136 MMOL/L (ref 136–145)
WBC # FLD AUTO: ABNORMAL /MM3
WBC # FLD AUTO: ABNORMAL /MM3
WBC MORPH BLD: NORMAL
WBC NRBC COR # BLD: 6.97 10*3/MM3 (ref 3.4–10.8)

## 2019-10-05 PROCEDURE — 89060 EXAM SYNOVIAL FLUID CRYSTALS: CPT | Performed by: EMERGENCY MEDICINE

## 2019-10-05 PROCEDURE — 99222 1ST HOSP IP/OBS MODERATE 55: CPT | Performed by: INTERNAL MEDICINE

## 2019-10-05 PROCEDURE — 85025 COMPLETE CBC W/AUTO DIFF WBC: CPT | Performed by: EMERGENCY MEDICINE

## 2019-10-05 PROCEDURE — 85652 RBC SED RATE AUTOMATED: CPT | Performed by: EMERGENCY MEDICINE

## 2019-10-05 PROCEDURE — 25010000002 VANCOMYCIN 10 G RECONSTITUTED SOLUTION: Performed by: EMERGENCY MEDICINE

## 2019-10-05 PROCEDURE — 87070 CULTURE OTHR SPECIMN AEROBIC: CPT | Performed by: ORTHOPAEDIC SURGERY

## 2019-10-05 PROCEDURE — 36415 COLL VENOUS BLD VENIPUNCTURE: CPT

## 2019-10-05 PROCEDURE — 87070 CULTURE OTHR SPECIMN AEROBIC: CPT | Performed by: EMERGENCY MEDICINE

## 2019-10-05 PROCEDURE — 80053 COMPREHEN METABOLIC PANEL: CPT | Performed by: EMERGENCY MEDICINE

## 2019-10-05 PROCEDURE — 84560 ASSAY OF URINE/URIC ACID: CPT | Performed by: EMERGENCY MEDICINE

## 2019-10-05 PROCEDURE — 89051 BODY FLUID CELL COUNT: CPT | Performed by: ORTHOPAEDIC SURGERY

## 2019-10-05 PROCEDURE — 89060 EXAM SYNOVIAL FLUID CRYSTALS: CPT | Performed by: ORTHOPAEDIC SURGERY

## 2019-10-05 PROCEDURE — 86140 C-REACTIVE PROTEIN: CPT | Performed by: EMERGENCY MEDICINE

## 2019-10-05 PROCEDURE — 84157 ASSAY OF PROTEIN OTHER: CPT | Performed by: HOSPITALIST

## 2019-10-05 PROCEDURE — 85007 BL SMEAR W/DIFF WBC COUNT: CPT | Performed by: EMERGENCY MEDICINE

## 2019-10-05 PROCEDURE — 25010000002 VANCOMYCIN 10 G RECONSTITUTED SOLUTION: Performed by: HOSPITALIST

## 2019-10-05 PROCEDURE — 86200 CCP ANTIBODY: CPT | Performed by: HOSPITALIST

## 2019-10-05 PROCEDURE — 87205 SMEAR GRAM STAIN: CPT | Performed by: EMERGENCY MEDICINE

## 2019-10-05 PROCEDURE — 0S9D3ZX DRAINAGE OF LEFT KNEE JOINT, PERCUTANEOUS APPROACH, DIAGNOSTIC: ICD-10-PCS | Performed by: ORTHOPAEDIC SURGERY

## 2019-10-05 PROCEDURE — 87015 SPECIMEN INFECT AGNT CONCNTJ: CPT | Performed by: EMERGENCY MEDICINE

## 2019-10-05 PROCEDURE — 84145 PROCALCITONIN (PCT): CPT | Performed by: EMERGENCY MEDICINE

## 2019-10-05 PROCEDURE — 87205 SMEAR GRAM STAIN: CPT | Performed by: ORTHOPAEDIC SURGERY

## 2019-10-05 PROCEDURE — 87040 BLOOD CULTURE FOR BACTERIA: CPT | Performed by: EMERGENCY MEDICINE

## 2019-10-05 PROCEDURE — 25010000003 LIDOCAINE 1 % SOLUTION: Performed by: ORTHOPAEDIC SURGERY

## 2019-10-05 PROCEDURE — 0S9D3ZX DRAINAGE OF LEFT KNEE JOINT, PERCUTANEOUS APPROACH, DIAGNOSTIC: ICD-10-PCS | Performed by: EMERGENCY MEDICINE

## 2019-10-05 PROCEDURE — 99284 EMERGENCY DEPT VISIT MOD MDM: CPT

## 2019-10-05 PROCEDURE — 87015 SPECIMEN INFECT AGNT CONCNTJ: CPT | Performed by: ORTHOPAEDIC SURGERY

## 2019-10-05 PROCEDURE — 82945 GLUCOSE OTHER FLUID: CPT | Performed by: HOSPITALIST

## 2019-10-05 PROCEDURE — 89051 BODY FLUID CELL COUNT: CPT | Performed by: EMERGENCY MEDICINE

## 2019-10-05 PROCEDURE — 73560 X-RAY EXAM OF KNEE 1 OR 2: CPT

## 2019-10-05 PROCEDURE — 83605 ASSAY OF LACTIC ACID: CPT | Performed by: EMERGENCY MEDICINE

## 2019-10-05 RX ORDER — SODIUM CHLORIDE 0.9 % (FLUSH) 0.9 %
10 SYRINGE (ML) INJECTION EVERY 12 HOURS SCHEDULED
Status: DISCONTINUED | OUTPATIENT
Start: 2019-10-05 | End: 2019-10-06 | Stop reason: HOSPADM

## 2019-10-05 RX ORDER — HYDROCODONE BITARTRATE AND ACETAMINOPHEN 5; 325 MG/1; MG/1
1 TABLET ORAL EVERY 6 HOURS PRN
Status: DISCONTINUED | OUTPATIENT
Start: 2019-10-05 | End: 2019-10-06 | Stop reason: HOSPADM

## 2019-10-05 RX ORDER — ASPIRIN 81 MG/1
81 TABLET ORAL DAILY
Status: DISCONTINUED | OUTPATIENT
Start: 2019-10-05 | End: 2019-10-06

## 2019-10-05 RX ORDER — ONDANSETRON 2 MG/ML
4 INJECTION INTRAMUSCULAR; INTRAVENOUS EVERY 6 HOURS PRN
Status: DISCONTINUED | OUTPATIENT
Start: 2019-10-05 | End: 2019-10-06 | Stop reason: HOSPADM

## 2019-10-05 RX ORDER — OXYCODONE HYDROCHLORIDE AND ACETAMINOPHEN 5; 325 MG/1; MG/1
1 TABLET ORAL ONCE
Status: COMPLETED | OUTPATIENT
Start: 2019-10-05 | End: 2019-10-05

## 2019-10-05 RX ORDER — AMLODIPINE BESYLATE 5 MG/1
5 TABLET ORAL EVERY MORNING
Status: DISCONTINUED | OUTPATIENT
Start: 2019-10-05 | End: 2019-10-06 | Stop reason: HOSPADM

## 2019-10-05 RX ORDER — MULTIPLE VITAMINS W/ MINERALS TAB 9MG-400MCG
1 TAB ORAL DAILY
Status: DISCONTINUED | OUTPATIENT
Start: 2019-10-05 | End: 2019-10-06 | Stop reason: HOSPADM

## 2019-10-05 RX ORDER — SODIUM CHLORIDE 0.9 % (FLUSH) 0.9 %
10 SYRINGE (ML) INJECTION AS NEEDED
Status: DISCONTINUED | OUTPATIENT
Start: 2019-10-05 | End: 2019-10-06 | Stop reason: HOSPADM

## 2019-10-05 RX ORDER — ACETAMINOPHEN 160 MG/5ML
650 SOLUTION ORAL EVERY 4 HOURS PRN
Status: DISCONTINUED | OUTPATIENT
Start: 2019-10-05 | End: 2019-10-06 | Stop reason: HOSPADM

## 2019-10-05 RX ORDER — ACETAMINOPHEN 325 MG/1
650 TABLET ORAL EVERY 4 HOURS PRN
Status: DISCONTINUED | OUTPATIENT
Start: 2019-10-05 | End: 2019-10-06 | Stop reason: HOSPADM

## 2019-10-05 RX ORDER — PANTOPRAZOLE SODIUM 40 MG/1
40 TABLET, DELAYED RELEASE ORAL EVERY MORNING
Status: DISCONTINUED | OUTPATIENT
Start: 2019-10-05 | End: 2019-10-06 | Stop reason: HOSPADM

## 2019-10-05 RX ORDER — LIDOCAINE HYDROCHLORIDE AND EPINEPHRINE 10; 10 MG/ML; UG/ML
10 INJECTION, SOLUTION INFILTRATION; PERINEURAL ONCE
Status: COMPLETED | OUTPATIENT
Start: 2019-10-05 | End: 2019-10-05

## 2019-10-05 RX ORDER — ACETAMINOPHEN 650 MG/1
650 SUPPOSITORY RECTAL EVERY 4 HOURS PRN
Status: DISCONTINUED | OUTPATIENT
Start: 2019-10-05 | End: 2019-10-06 | Stop reason: HOSPADM

## 2019-10-05 RX ORDER — LIDOCAINE HYDROCHLORIDE 10 MG/ML
30 INJECTION, SOLUTION INFILTRATION; PERINEURAL ONCE
Status: COMPLETED | OUTPATIENT
Start: 2019-10-05 | End: 2019-10-05

## 2019-10-05 RX ADMIN — ACETAMINOPHEN 650 MG: 325 TABLET, FILM COATED ORAL at 17:32

## 2019-10-05 RX ADMIN — AMLODIPINE BESYLATE 5 MG: 5 TABLET ORAL at 13:58

## 2019-10-05 RX ADMIN — VANCOMYCIN HYDROCHLORIDE 1250 MG: 10 INJECTION, POWDER, LYOPHILIZED, FOR SOLUTION INTRAVENOUS at 18:49

## 2019-10-05 RX ADMIN — OXYCODONE HYDROCHLORIDE AND ACETAMINOPHEN 1 TABLET: 5; 325 TABLET ORAL at 03:33

## 2019-10-05 RX ADMIN — HYDROCODONE BITARTRATE AND ACETAMINOPHEN 1 TABLET: 5; 325 TABLET ORAL at 18:50

## 2019-10-05 RX ADMIN — LIDOCAINE HYDROCHLORIDE AND EPINEPHRINE 10 ML: 10; 10 INJECTION, SOLUTION INFILTRATION; PERINEURAL at 03:45

## 2019-10-05 RX ADMIN — HYDROCODONE BITARTRATE AND ACETAMINOPHEN 1 TABLET: 5; 325 TABLET ORAL at 12:23

## 2019-10-05 RX ADMIN — SODIUM CHLORIDE 1750 MG: 9 INJECTION, SOLUTION INTRAVENOUS at 06:44

## 2019-10-05 RX ADMIN — MULTIPLE VITAMINS W/ MINERALS TAB 1 TABLET: TAB at 17:32

## 2019-10-05 RX ADMIN — SODIUM CHLORIDE, PRESERVATIVE FREE 10 ML: 5 INJECTION INTRAVENOUS at 13:15

## 2019-10-05 RX ADMIN — SODIUM CHLORIDE, PRESERVATIVE FREE 10 ML: 5 INJECTION INTRAVENOUS at 20:54

## 2019-10-05 RX ADMIN — LIDOCAINE HYDROCHLORIDE 10 ML: 10 INJECTION, SOLUTION INFILTRATION; PERINEURAL at 14:40

## 2019-10-05 RX ADMIN — PANTOPRAZOLE SODIUM 40 MG: 40 TABLET, DELAYED RELEASE ORAL at 12:23

## 2019-10-06 VITALS
DIASTOLIC BLOOD PRESSURE: 63 MMHG | SYSTOLIC BLOOD PRESSURE: 143 MMHG | WEIGHT: 195 LBS | HEIGHT: 70 IN | TEMPERATURE: 97.7 F | HEART RATE: 60 BPM | RESPIRATION RATE: 20 BRPM | BODY MASS INDEX: 27.92 KG/M2 | OXYGEN SATURATION: 93 %

## 2019-10-06 LAB
ANION GAP SERPL CALCULATED.3IONS-SCNC: 12.2 MMOL/L (ref 5–15)
BASOPHILS # BLD AUTO: 0.03 10*3/MM3 (ref 0–0.2)
BASOPHILS NFR BLD AUTO: 0.4 % (ref 0–1.5)
BUN BLD-MCNC: 18 MG/DL (ref 8–23)
BUN/CREAT SERPL: 14.6 (ref 7–25)
CALCIUM SPEC-SCNC: 8.9 MG/DL (ref 8.6–10.5)
CHLORIDE SERPL-SCNC: 102 MMOL/L (ref 98–107)
CO2 SERPL-SCNC: 23.8 MMOL/L (ref 22–29)
CREAT BLD-MCNC: 1.23 MG/DL (ref 0.76–1.27)
DEPRECATED RDW RBC AUTO: 50.1 FL (ref 37–54)
EOSINOPHIL # BLD AUTO: 0.06 10*3/MM3 (ref 0–0.4)
EOSINOPHIL NFR BLD AUTO: 0.9 % (ref 0.3–6.2)
ERYTHROCYTE [DISTWIDTH] IN BLOOD BY AUTOMATED COUNT: 13.2 % (ref 12.3–15.4)
GFR SERPL CREATININE-BSD FRML MDRD: 57 ML/MIN/1.73
GLUCOSE BLD-MCNC: 141 MG/DL (ref 65–99)
HCT VFR BLD AUTO: 32.3 % (ref 37.5–51)
HGB BLD-MCNC: 11 G/DL (ref 13–17.7)
IMM GRANULOCYTES # BLD AUTO: 0.02 10*3/MM3 (ref 0–0.05)
IMM GRANULOCYTES NFR BLD AUTO: 0.3 % (ref 0–0.5)
LYMPHOCYTES # BLD AUTO: 1.38 10*3/MM3 (ref 0.7–3.1)
LYMPHOCYTES NFR BLD AUTO: 20.7 % (ref 19.6–45.3)
MCH RBC QN AUTO: 35 PG (ref 26.6–33)
MCHC RBC AUTO-ENTMCNC: 34.1 G/DL (ref 31.5–35.7)
MCV RBC AUTO: 102.9 FL (ref 79–97)
MONOCYTES # BLD AUTO: 1.02 10*3/MM3 (ref 0.1–0.9)
MONOCYTES NFR BLD AUTO: 15.3 % (ref 5–12)
NEUTROPHILS # BLD AUTO: 4.17 10*3/MM3 (ref 1.7–7)
NEUTROPHILS NFR BLD AUTO: 62.4 % (ref 42.7–76)
NRBC BLD AUTO-RTO: 0 /100 WBC (ref 0–0.2)
PLATELET # BLD AUTO: 167 10*3/MM3 (ref 140–450)
PMV BLD AUTO: 10.1 FL (ref 6–12)
POTASSIUM BLD-SCNC: 3.8 MMOL/L (ref 3.5–5.2)
RBC # BLD AUTO: 3.14 10*6/MM3 (ref 4.14–5.8)
SODIUM BLD-SCNC: 138 MMOL/L (ref 136–145)
URATE SERPL-MCNC: 5.3 MG/DL (ref 3.4–7)
WBC NRBC COR # BLD: 6.68 10*3/MM3 (ref 3.4–10.8)

## 2019-10-06 PROCEDURE — 80048 BASIC METABOLIC PNL TOTAL CA: CPT | Performed by: NURSE PRACTITIONER

## 2019-10-06 PROCEDURE — 99231 SBSQ HOSP IP/OBS SF/LOW 25: CPT | Performed by: INTERNAL MEDICINE

## 2019-10-06 PROCEDURE — G0008 ADMIN INFLUENZA VIRUS VAC: HCPCS | Performed by: HOSPITALIST

## 2019-10-06 PROCEDURE — 25010000002 INFLUENZA VAC SPLIT QUAD 0.5 ML SUSPENSION PREFILLED SYRINGE: Performed by: HOSPITALIST

## 2019-10-06 PROCEDURE — 85025 COMPLETE CBC W/AUTO DIFF WBC: CPT | Performed by: NURSE PRACTITIONER

## 2019-10-06 PROCEDURE — 25010000002 VANCOMYCIN 10 G RECONSTITUTED SOLUTION: Performed by: HOSPITALIST

## 2019-10-06 PROCEDURE — 90686 IIV4 VACC NO PRSV 0.5 ML IM: CPT | Performed by: HOSPITALIST

## 2019-10-06 PROCEDURE — 84550 ASSAY OF BLOOD/URIC ACID: CPT | Performed by: HOSPITALIST

## 2019-10-06 RX ADMIN — INFLUENZA A VIRUS A/BRISBANE/02/2018 IVR-190 (H1N1) ANTIGEN (PROPIOLACTONE INACTIVATED), INFLUENZA A VIRUS A/KANSAS/14/2017 X-327 (H3N2) ANTIGEN (PROPIOLACTONE INACTIVATED), INFLUENZA B VIRUS B/MARYLAND/15/2016 ANTIGEN (PROPIOLACTONE INACTIVATED), INFLUENZA B VIRUS B/PHUKET/3073/2013 BVR-1B ANTIGEN (PROPIOLACTONE INACTIVATED) 0.5 ML: 15; 15; 15; 15 INJECTION, SUSPENSION INTRAMUSCULAR at 16:22

## 2019-10-06 RX ADMIN — AMLODIPINE BESYLATE 5 MG: 5 TABLET ORAL at 06:21

## 2019-10-06 RX ADMIN — MULTIPLE VITAMINS W/ MINERALS TAB 1 TABLET: TAB at 08:30

## 2019-10-06 RX ADMIN — VANCOMYCIN HYDROCHLORIDE 1250 MG: 10 INJECTION, POWDER, LYOPHILIZED, FOR SOLUTION INTRAVENOUS at 06:21

## 2019-10-06 RX ADMIN — PANTOPRAZOLE SODIUM 40 MG: 40 TABLET, DELAYED RELEASE ORAL at 06:21

## 2019-10-06 RX ADMIN — SODIUM CHLORIDE, PRESERVATIVE FREE 10 ML: 5 INJECTION INTRAVENOUS at 08:31

## 2019-10-07 LAB
GLUCOSE FLD-MCNC: 65 MG/DL
PROT FLD-MCNC: 5.6 G/DL
URATE FLD-MCNC: 5.7 MG/DL

## 2019-10-08 LAB — CCP IGA+IGG SERPL IA-ACNC: 9 UNITS (ref 0–19)

## 2019-10-09 LAB
BACTERIA FLD CULT: NORMAL
GRAM STN SPEC: NORMAL
GRAM STN SPEC: NORMAL

## 2019-10-10 LAB
BACTERIA FLD CULT: NORMAL
BACTERIA SPEC AEROBE CULT: NORMAL
BACTERIA SPEC AEROBE CULT: NORMAL
GRAM STN SPEC: NORMAL
GRAM STN SPEC: NORMAL

## 2019-10-14 ENCOUNTER — OFFICE VISIT CONVERTED (OUTPATIENT)
Dept: FAMILY MEDICINE CLINIC | Age: 79
End: 2019-10-14
Attending: FAMILY MEDICINE

## 2019-10-14 ENCOUNTER — HOSPITAL ENCOUNTER (OUTPATIENT)
Dept: OTHER | Facility: HOSPITAL | Age: 79
Discharge: HOME | End: 2019-10-14
Attending: FAMILY MEDICINE

## 2019-10-14 LAB
BASOPHILS # BLD MANUAL: 0.04 10*3/UL (ref 0–0.2)
BASOPHILS NFR BLD MANUAL: 0.7 % (ref 0–3)
DEPRECATED RDW RBC AUTO: 50.1 FL
EOSINOPHIL # BLD MANUAL: 0.14 10*3/UL (ref 0–0.7)
EOSINOPHIL NFR BLD MANUAL: 2.6 % (ref 0–7)
ERYTHROCYTE [DISTWIDTH] IN BLOOD BY AUTOMATED COUNT: 13 % (ref 11.5–14.5)
GRANS (ABSOLUTE): 3.24 10*3/UL (ref 2–8)
GRANS: 59.1 % (ref 30–85)
HBA1C MFR BLD: 12 G/DL (ref 14–18)
HCT VFR BLD AUTO: 35.8 % (ref 42–52)
IMM GRANULOCYTES # BLD: 0.01 10*3/UL (ref 0–0.54)
IMM GRANULOCYTES NFR BLD: 0.2 % (ref 0–0.43)
LYMPHOCYTES # BLD MANUAL: 1.43 10*3/UL (ref 1–5)
LYMPHOCYTES NFR BLD MANUAL: 11.3 % (ref 3–10)
MCH RBC QN AUTO: 35.1 PG (ref 27–31)
MCHC RBC AUTO-ENTMCNC: 33.5 G/DL (ref 33–37)
MCV RBC AUTO: 104.7 FL (ref 80–96)
MONOCYTES # BLD AUTO: 0.62 10*3/UL (ref 0.2–1.2)
PLATELET # BLD AUTO: 294 10*3/UL (ref 130–400)
PMV BLD AUTO: 10.1 FL (ref 7.4–10.4)
RBC # BLD AUTO: 3.42 10*6/UL (ref 4.7–6.1)
VARIANT LYMPHS NFR BLD MANUAL: 26.1 % (ref 20–45)
WBC # BLD AUTO: 5.48 10*3/UL (ref 4.8–10.8)

## 2019-11-21 ENCOUNTER — OFFICE VISIT (OUTPATIENT)
Dept: CARDIOLOGY | Facility: CLINIC | Age: 79
End: 2019-11-21

## 2019-11-21 VITALS
BODY MASS INDEX: 28 KG/M2 | HEART RATE: 56 BPM | DIASTOLIC BLOOD PRESSURE: 72 MMHG | HEIGHT: 70 IN | WEIGHT: 195.6 LBS | SYSTOLIC BLOOD PRESSURE: 148 MMHG

## 2019-11-21 DIAGNOSIS — M00.9 PYOGENIC ARTHRITIS OF LEFT KNEE JOINT, DUE TO UNSPECIFIED ORGANISM (HCC): ICD-10-CM

## 2019-11-21 DIAGNOSIS — E78.2 MIXED HYPERLIPIDEMIA: ICD-10-CM

## 2019-11-21 DIAGNOSIS — R60.0 LOWER EXTREMITY EDEMA: Primary | ICD-10-CM

## 2019-11-21 DIAGNOSIS — I10 ESSENTIAL HYPERTENSION: ICD-10-CM

## 2019-11-21 PROBLEM — E78.5 HLD (HYPERLIPIDEMIA): Status: ACTIVE | Noted: 2019-11-21

## 2019-11-21 PROBLEM — R50.9 FEVER AND CHILLS: Status: RESOLVED | Noted: 2019-10-05 | Resolved: 2019-11-21

## 2019-11-21 PROCEDURE — 93000 ELECTROCARDIOGRAM COMPLETE: CPT | Performed by: NURSE PRACTITIONER

## 2019-11-21 PROCEDURE — 99214 OFFICE O/P EST MOD 30 MIN: CPT | Performed by: NURSE PRACTITIONER

## 2019-11-21 RX ORDER — AMLODIPINE BESYLATE 10 MG/1
10 TABLET ORAL EVERY MORNING
Qty: 90 TABLET | Refills: 1 | Status: SHIPPED | OUTPATIENT
Start: 2019-11-21 | End: 2019-11-21 | Stop reason: SDUPTHER

## 2019-11-21 RX ORDER — HYDROCHLOROTHIAZIDE 25 MG/1
25 TABLET ORAL DAILY
Qty: 30 TABLET | Refills: 1 | Status: SHIPPED | OUTPATIENT
Start: 2019-11-21 | End: 2019-12-03

## 2019-11-21 RX ORDER — AMLODIPINE BESYLATE 10 MG/1
10 TABLET ORAL EVERY MORNING
Qty: 90 TABLET | Refills: 1 | Status: SHIPPED | OUTPATIENT
Start: 2019-11-21 | End: 2020-04-07

## 2019-11-21 NOTE — PROGRESS NOTES
Date of Office Visit: 2019  Encounter Provider: JANNY Peters  Place of Service: [facility] CARDIOLOGY  Patient Name: Quincy Roberts  :1940  Primary Cardiologist:      Chief Complaint   Patient presents with   • Follow-up   :     Dear      HPI: Quincy Roberts is a pleasant 79 y.o. male who presents today for annual cardiac follow up. He is a new patient to me and his previous records have been reviewed.    In 2002, he had a cardiac catheterization which showed myocardial bridging, but no significant stenosis.  In any way , he had a nuclear stress test which showed no evidence of ischemia.  He was diagnosed with prostate cancer in 2015 and underwent radiation therapy.  He has been diagnosed with hypertension and was unable to tolerate carvedilol in the past.  He has been treated with amlodipine.    In 2019, he was hospitalized for left knee swelling and found to have a large left knee effusion.  He was treated with IV vancomycin and underwent left knee arthrocentesis with 110 mL bloody synovial fluid that was removed.  I reviewed his blood work from that hospitalization which included a CBC which showed hemoglobin of 12.1 and hematocrit 36.8.  CMP normal except for elevated glucose of 176.    He presents today for his annual follow-up visit.  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  He is continued to have some residual left knee swelling and now some lower extremity edema of that left leg.  He says his doxazosin was discontinued because of a low diastolic blood pressure by his PCP.  He denies chest pain, shortness of breath, PND, orthopnea, cough, wheezing, palpitations, dizziness, syncope, or bleeding.  His blood pressure is elevated today and recently at home it was 148/70.    Past Medical History:   Diagnosis Date   • Anemia    • Diverticulosis    • GERD (gastroesophageal reflux disease)    • Health care maintenance     • History of colon polyps    • History of jaundice     Childhood   • History of prostate cancer 2014   • History of radiation therapy    • Hyperlipidemia    • Hypertension    • Kidney stones    • Osteoarthritis    • Prostate cancer (CMS/HCC) 2014       Past Surgical History:   Procedure Laterality Date   • COLONOSCOPY N/A 8/10/2018    Procedure: COLONOSCOPY INTO CECUM AND TERMINAL ILEUM;  Surgeon: Valentino Stern MD;  Location: [facility] ENDOSCOPY;  Service: Gastroenterology   • ENDOSCOPY N/A 8/2/2017    Procedure: ESOPHAGOGASTRODUODENOSCOPY WITH COLD BIOPSIES;  Surgeon: Valentino PEÑA MD;  Location: [facility] ENDOSCOPY;  Service:    • HERNIA REPAIR  2010   • ROTATOR CUFF REPAIR Left 2003   • TOTAL HIP ARTHROPLASTY Right 2011       Social History     Socioeconomic History   • Marital status:      Spouse name: Kassidy   • Number of children: Not on file   • Years of education: High School   • Highest education level: Not on file   Occupational History   • Occupation:      Employer: SELF-EMPLOYED   Tobacco Use   • Smoking status: Never Smoker   • Smokeless tobacco: Never Used   • Tobacco comment: caffeine use   Substance and Sexual Activity   • Alcohol use: No   • Drug use: No   • Sexual activity: Defer       Family History   Problem Relation Age of Onset   • Breast cancer Mother 70   • Dementia Mother    • Malig Hyperthermia Neg Hx        The following portion of the patient's history were reviewed and updated as appropriate: past medical history, past surgical history, past social history, past family history, allergies, current medications, and problem list.    Review of Systems   Constitution: Negative for chills, diaphoresis, fever, malaise/fatigue, night sweats, weight gain and weight loss.   HENT: Negative for hearing loss, nosebleeds, sore throat and tinnitus.    Eyes: Negative for blurred vision, double vision, pain and visual disturbance.   Cardiovascular: Positive for leg swelling.  Negative for chest pain, claudication, cyanosis, dyspnea on exertion, irregular heartbeat, near-syncope, orthopnea, palpitations, paroxysmal nocturnal dyspnea and syncope.   Respiratory: Negative for cough, hemoptysis, shortness of breath, snoring and wheezing.    Endocrine: Negative for cold intolerance, heat intolerance and polyuria.   Hematologic/Lymphatic: Negative for bleeding problem. Does not bruise/bleed easily.   Skin: Negative for color change, dry skin, flushing and itching.   Musculoskeletal: Negative for falls, joint pain, joint swelling, muscle cramps, muscle weakness and myalgias.   Gastrointestinal: Negative for abdominal pain, constipation, heartburn, melena, nausea and vomiting.   Genitourinary: Positive for frequency. Negative for dysuria and hematuria.   Neurological: Negative for excessive daytime sleepiness, dizziness, light-headedness, loss of balance, numbness, paresthesias, seizures and vertigo.   Psychiatric/Behavioral: Negative for altered mental status, depression, memory loss and substance abuse. The patient does not have insomnia and is not nervous/anxious.    Allergic/Immunologic: Negative for environmental allergies.       Allergies   Allergen Reactions   • Metoclopramide Anaphylaxis         Current Outpatient Medications:   •  Acetaminophen (TYLENOL PO), Take 1 tablet by mouth As Needed., Disp: , Rfl:   •  amLODIPine (NORVASC) 7.5 MG tablet, Every Morning., Disp: 90 tablet, Rfl: 1  •  Cholecalciferol (VITAMIN D3 PO), Take 1,000 mg by mouth Daily., Disp: , Rfl:   •  coenzyme Q10 50 MG capsule capsule, Take  by mouth Daily., Disp: , Rfl:   •  Cyanocobalamin (B-12 PO), Take 750 mcg by mouth., Disp: , Rfl:   •  esomeprazole (NEXIUM) 40 MG capsule, Take 40 mg by mouth Every Morning Before Breakfast., Disp: , Rfl:   •  fluticasone (FLONASE) 50 MCG/ACT nasal spray, 2 sprays into the nostril(s) as directed by provider Daily., Disp: , Rfl:   •  FOLIC ACID PO, Take 100 mcg by mouth., Disp: ,  "Rfl:   •  Lactobacillus (PROBIOTIC ACIDOPHILUS PO), Take 1 tablet by mouth Daily., Disp: , Rfl:   •  Omega-3 Fatty Acids (FISH OIL) 1000 MG capsule capsule, Take 1,000 mg by mouth Daily With Breakfast., Disp: , Rfl:   •  Pyridoxine HCl (VITAMIN B6 PO), Take 30 mg by mouth., Disp: , Rfl:   •  TURMERIC PO, Take 250 mg by mouth., Disp: , Rfl:         Objective:     Vitals:    11/21/19 1056 11/21/19 1057   BP: 150/74 148/72   BP Location: Left arm Right arm   Patient Position: Sitting Sitting   Pulse: 56    Weight: 88.7 kg (195 lb 9.6 oz)    Height: 177.8 cm (70\")      Body mass index is 28.07 kg/m².    PHYSICAL EXAM:    Vitals Reviewed.   General Appearance: No acute distress, well developed and well nourished.    Eyes: Conjunctiva and lids: No erythema, swelling, or discharge. Sclera non-icteric.   HENT: Atraumatic, normocephalic. External eyes, ears, and nose normal. No hearing loss noted. Mucous membranes normal. Lips not cyanotic. Neck supple with no tenderness.  Respiratory: No signs of respiratory distress. Respiration rhythm and depth normal.   Clear to auscultation. No rales, crackles, rhonchi, or wheezing auscultated.   Cardiovascular:  Jugular Venous Pressure: Normal  Heart Rate and Rhythm: Normal rhythm; bradycardic.  Heart Sounds: Normal S1 and S2. No S3 or S4 noted.  Murmurs: No murmurs noted. No rubs, thrills, or gallops.   Arterial Pulses: Carotid pulses normal. No carotid bruit noted. Posterior tibialis and dorsalis pedis pulses normal.   Lower Extremities: +1 left lower extremity edema and swelling of the knee noted.  Gastrointestinal:  Abdomen soft, non-distended, non-tender. Normal bowel sounds. No hepatomegaly.   Musculoskeletal: Normal movement of extremities  Skin and Nails: General appearance normal. No pallor, cyanosis, diaphoresis. Skin temperature normal. No clubbing of fingernails.   Psychiatric: Patient alert and oriented to person, place, and time. Speech and behavior appropriate. Normal " mood and affect.       ECG 12 Lead  Date/Time: 11/21/2019 10:53 AM  Performed by: Farida Barrientos APRN  Authorized by: Farida Barrientos APRN   Comparison: compared with previous ECG from 11/21/2018  Similar to previous ECG  Rhythm: sinus rhythm  Rate: bradycardic  BPM: 56  Conduction: conduction normal  ST Segments: ST segments normal  T Waves: T waves normal  QRS axis: normal  Other: no other findings    Clinical impression: normal ECG              Assessment:       Diagnosis Plan   1. Lower extremity edema     2. Pyogenic arthritis of left knee joint, due to unspecified organism (CMS/HCC)     3. Essential hypertension  Basic Metabolic Panel   4. Mixed hyperlipidemia            Plan:       1.  Lower Extremity Edema: Dr. Cabezas evaluated the left lower extremity swelling and feels that it is coming from the knee.  We have placed a call to Dr. Jaiden Go's office for him to have an appointment to be seen in the near future.  We are going to start him on hydrochlorothiazide 25 mg 1 tablet daily for treatment of blood pressure and lower extremity edema.    2.  Left Knee: His left knee is edematous and he just had arthrocentesis completed.  We have placed a call to Dr. Jaiden Go's office for an appointment.    3.  Hypertension: Blood pressure is elevated today.  Increase amlodipine to 10 mg daily and start HCTZ 25 mg 1 tablet daily.  Repeat BMP in 7-10 days at [facility].  He is to call if he has any adverse effects from the medications.    4.  Hyperlipidemia: Followed by his PCP.    5.  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  Denies anginal symptoms.    6.  He will follow-up with me in 6 weeks for evaluation of his blood pressure with the new medication changes.  I have recommended follow-up with Dr. Cabezas in 1 year, unless otherwise needed sooner.     As always, it has been a pleasure to participate in your patient's care. Thank you.       Sincerely,         Farida  JANNY Barrientos        Dictated utilizing Dragon dictation

## 2019-11-21 NOTE — PATIENT INSTRUCTIONS
Increase amlodipine 10 mg in morning  HCTZ 25 mg 1 tablet daily - sent to [facility]    [facility] - blood draw BMP at 7-10 days    Follow up with me in 6 weeks

## 2019-11-23 ENCOUNTER — TELEPHONE (OUTPATIENT)
Dept: CARDIOLOGY | Facility: CLINIC | Age: 79
End: 2019-11-23

## 2019-11-23 RX ORDER — FUROSEMIDE 20 MG/1
20 TABLET ORAL DAILY
Qty: 30 TABLET | Refills: 11 | Status: SHIPPED | OUTPATIENT
Start: 2019-11-23 | End: 2020-05-07

## 2019-11-23 NOTE — TELEPHONE ENCOUNTER
Mr. Roberts called concerned because after he started taking the HCTZ 25 mg he started itching all over. He has not noticed any rashes, throat swelling, or mouth tingling/swelling but he was concerned and his blood pressure was 160/70 and 174/71. He wanted to know what to do about his water pill. I discussed with Dr. Castillo and he asked that I call in 20 mg po lasix once a day to his pharmacy and have him take that and STOP the HCTZ. I called Quincy and back and let him know and he verbalized understanding.

## 2019-11-25 ENCOUNTER — TELEPHONE (OUTPATIENT)
Dept: CARDIOLOGY | Facility: CLINIC | Age: 79
End: 2019-11-25

## 2019-11-25 RX ORDER — LISINOPRIL 10 MG/1
10 TABLET ORAL DAILY
Qty: 30 TABLET | Refills: 11 | Status: SHIPPED | OUTPATIENT
Start: 2019-11-25 | End: 2019-11-26 | Stop reason: SDUPTHER

## 2019-11-25 NOTE — TELEPHONE ENCOUNTER
Let us add lisinopril 10 mg daily to his regimen.  Have him come next week for blood pressure appointment.

## 2019-11-25 NOTE — TELEPHONE ENCOUNTER
Yumiko Syed    Patient will pickup Lisinopril at [facility] in [place] and take 1 tab daily. Sheduled patient for BP check 12/3. Patient to bring in his machine    Lana Crandall RN  [facility] Triage Nurse

## 2019-11-25 NOTE — TELEPHONE ENCOUNTER
"11/25/19  1:58 PM  Quincy Roberts  1940  Home Phone [phone]   Mobile [phone]       Quincy Roberts is a patient of Dr Cabezas who was seen in the office last week by Farida Chiu NP on 11/21/19. Patient was placed on HCTZ 25mg daily for HTN. Patient also had some swelling in knee. He went to orthopedic MD and had that drained 11/22/19.  Patient spoke with Dr Castillo on Saturday and it was decided that patient had an allergic reaction to the HCTZ and he was then placed on Lasix 20mg daily. Patient called office today to report that his BP has not gone down at all. Did not have BP readings for Saturday.  Sun  /71  Today /66  Patient with complaints of \"face feels hot and have a little bit of a headache\" Denies edema in BLE/SOA/chest pain    Please advise on how to proceed    Thank you  Lana Crandall RN  [facility] Triage Nurse        "

## 2019-11-26 ENCOUNTER — TELEPHONE (OUTPATIENT)
Dept: CARDIOLOGY | Facility: CLINIC | Age: 79
End: 2019-11-26

## 2019-11-26 ENCOUNTER — APPOINTMENT (OUTPATIENT)
Dept: GENERAL RADIOLOGY | Facility: HOSPITAL | Age: 79
End: 2019-11-26

## 2019-11-26 ENCOUNTER — HOSPITAL ENCOUNTER (EMERGENCY)
Facility: HOSPITAL | Age: 79
Discharge: HOME | End: 2019-11-26
Attending: EMERGENCY MEDICINE | Admitting: EMERGENCY MEDICINE

## 2019-11-26 VITALS
OXYGEN SATURATION: 97 % | HEART RATE: 60 BPM | BODY MASS INDEX: 28.07 KG/M2 | TEMPERATURE: 99.4 F | SYSTOLIC BLOOD PRESSURE: 146 MMHG | RESPIRATION RATE: 16 BRPM | HEIGHT: 70 IN | DIASTOLIC BLOOD PRESSURE: 80 MMHG

## 2019-11-26 DIAGNOSIS — I10 ESSENTIAL HYPERTENSION: Primary | ICD-10-CM

## 2019-11-26 LAB
ALBUMIN SERPL-MCNC: 4.6 G/DL (ref 3.5–5.2)
ALBUMIN/GLOB SERPL: 1.5 G/DL
ALP SERPL-CCNC: 68 U/L (ref 39–117)
ALT SERPL W P-5'-P-CCNC: 21 U/L (ref 1–41)
ANION GAP SERPL CALCULATED.3IONS-SCNC: 11.2 MMOL/L (ref 5–15)
AST SERPL-CCNC: 18 U/L (ref 1–40)
BASOPHILS # BLD AUTO: 0.02 10*3/MM3 (ref 0–0.2)
BASOPHILS NFR BLD AUTO: 0.3 % (ref 0–1.5)
BILIRUB SERPL-MCNC: 0.7 MG/DL (ref 0.2–1.2)
BUN BLD-MCNC: 17 MG/DL (ref 8–23)
BUN/CREAT SERPL: 14.4 (ref 7–25)
CALCIUM SPEC-SCNC: 9.4 MG/DL (ref 8.6–10.5)
CHLORIDE SERPL-SCNC: 97 MMOL/L (ref 98–107)
CO2 SERPL-SCNC: 26.8 MMOL/L (ref 22–29)
CREAT BLD-MCNC: 1.18 MG/DL (ref 0.76–1.27)
DEPRECATED RDW RBC AUTO: 52.7 FL (ref 37–54)
EOSINOPHIL # BLD AUTO: 0.07 10*3/MM3 (ref 0–0.4)
EOSINOPHIL NFR BLD AUTO: 1.1 % (ref 0.3–6.2)
ERYTHROCYTE [DISTWIDTH] IN BLOOD BY AUTOMATED COUNT: 14.3 % (ref 12.3–15.4)
GFR SERPL CREATININE-BSD FRML MDRD: 60 ML/MIN/1.73
GLOBULIN UR ELPH-MCNC: 3 GM/DL
GLUCOSE BLD-MCNC: 111 MG/DL (ref 65–99)
HCT VFR BLD AUTO: 35.6 % (ref 37.5–51)
HGB BLD-MCNC: 12.4 G/DL (ref 13–17.7)
IMM GRANULOCYTES # BLD AUTO: 0.02 10*3/MM3 (ref 0–0.05)
IMM GRANULOCYTES NFR BLD AUTO: 0.3 % (ref 0–0.5)
LYMPHOCYTES # BLD AUTO: 1.57 10*3/MM3 (ref 0.7–3.1)
LYMPHOCYTES NFR BLD AUTO: 25.2 % (ref 19.6–45.3)
MCH RBC QN AUTO: 35.1 PG (ref 26.6–33)
MCHC RBC AUTO-ENTMCNC: 34.8 G/DL (ref 31.5–35.7)
MCV RBC AUTO: 100.8 FL (ref 79–97)
MONOCYTES # BLD AUTO: 0.71 10*3/MM3 (ref 0.1–0.9)
MONOCYTES NFR BLD AUTO: 11.4 % (ref 5–12)
NEUTROPHILS # BLD AUTO: 3.85 10*3/MM3 (ref 1.7–7)
NEUTROPHILS NFR BLD AUTO: 61.7 % (ref 42.7–76)
NRBC BLD AUTO-RTO: 0 /100 WBC (ref 0–0.2)
PLATELET # BLD AUTO: 237 10*3/MM3 (ref 140–450)
PMV BLD AUTO: 9.5 FL (ref 6–12)
POTASSIUM BLD-SCNC: 3.6 MMOL/L (ref 3.5–5.2)
PROT SERPL-MCNC: 7.6 G/DL (ref 6–8.5)
RBC # BLD AUTO: 3.53 10*6/MM3 (ref 4.14–5.8)
SODIUM BLD-SCNC: 135 MMOL/L (ref 136–145)
TROPONIN T SERPL-MCNC: <0.01 NG/ML (ref 0–0.03)
WBC NRBC COR # BLD: 6.24 10*3/MM3 (ref 3.4–10.8)

## 2019-11-26 PROCEDURE — 85025 COMPLETE CBC W/AUTO DIFF WBC: CPT | Performed by: EMERGENCY MEDICINE

## 2019-11-26 PROCEDURE — 84484 ASSAY OF TROPONIN QUANT: CPT | Performed by: EMERGENCY MEDICINE

## 2019-11-26 PROCEDURE — 93005 ELECTROCARDIOGRAM TRACING: CPT | Performed by: EMERGENCY MEDICINE

## 2019-11-26 PROCEDURE — 71046 X-RAY EXAM CHEST 2 VIEWS: CPT

## 2019-11-26 PROCEDURE — 80053 COMPREHEN METABOLIC PANEL: CPT | Performed by: EMERGENCY MEDICINE

## 2019-11-26 PROCEDURE — 93010 ELECTROCARDIOGRAM REPORT: CPT | Performed by: INTERNAL MEDICINE

## 2019-11-26 PROCEDURE — 99284 EMERGENCY DEPT VISIT MOD MDM: CPT

## 2019-11-26 RX ORDER — ACETAMINOPHEN 500 MG
1000 TABLET ORAL ONCE
Status: COMPLETED | OUTPATIENT
Start: 2019-11-26 | End: 2019-11-26

## 2019-11-26 RX ORDER — SODIUM CHLORIDE 0.9 % (FLUSH) 0.9 %
10 SYRINGE (ML) INJECTION AS NEEDED
Status: DISCONTINUED | OUTPATIENT
Start: 2019-11-26 | End: 2019-11-27 | Stop reason: HOSPADM

## 2019-11-26 RX ORDER — LISINOPRIL 10 MG/1
20 TABLET ORAL DAILY
Qty: 30 TABLET | Refills: 0 | Status: SHIPPED | OUTPATIENT
Start: 2019-11-26 | End: 2019-12-05 | Stop reason: DRUGHIGH

## 2019-11-26 RX ADMIN — ACETAMINOPHEN 1000 MG: 500 TABLET, FILM COATED ORAL at 19:34

## 2019-11-26 NOTE — TELEPHONE ENCOUNTER
Pt called to inform us of elevated B/P 200/79 with HR 76 despite taking his medications this morning and his new medication lisinopril last night. He also is having chest tightness and feeling very flushed and not feeling well. He spoke to his PCP and he was instructed to come to the ED. He was calling to inform us of this.

## 2019-11-26 NOTE — TELEPHONE ENCOUNTER
Thanks Anum!    Lana - can you let him know when he comes in for the BP check that he should also go to main lab for a BMP to check his electrolytes and kidney function. Order has been placed. Thanks.

## 2019-11-26 NOTE — TELEPHONE ENCOUNTER
Called patient to let him know he needed to get labs drawn prior to BP check. He still has the prescription order for a BMP that was given to him at last visit so he will go Monday to [facility] to have labs drawn. Gave him [facility] main fax number and instructed him to ask that the results be faxed.    Lana Crandall RN  [facility] Triage Nurse

## 2019-12-02 ENCOUNTER — HOSPITAL ENCOUNTER (OUTPATIENT)
Dept: OTHER | Facility: HOSPITAL | Age: 79
Discharge: HOME | End: 2019-12-02
Attending: NURSE PRACTITIONER

## 2019-12-02 LAB
ANION GAP SERPL CALC-SCNC: 20 MMOL/L (ref 8–19)
BUN SERPL-MCNC: 14 MG/DL (ref 5–25)
BUN/CREAT SERPL: 13 {RATIO} (ref 6–20)
CALCIUM SERPL-MCNC: 9 MG/DL (ref 8.7–10.4)
CHLORIDE SERPL-SCNC: 102 MMOL/L (ref 99–111)
CONV CO2: 22 MMOL/L (ref 22–32)
CREAT UR-MCNC: 1.05 MG/DL (ref 0.7–1.2)
GFR SERPLBLD BASED ON 1.73 SQ M-ARVRAT: >60 ML/MIN/{1.73_M2}
GLUCOSE SERPL-MCNC: 116 MG/DL (ref 70–99)
OSMOLALITY SERPL CALC.SUM OF ELEC: 291 MOSM/KG (ref 273–304)
POTASSIUM SERPL-SCNC: 3.9 MMOL/L (ref 3.5–5.3)
SODIUM SERPL-SCNC: 140 MMOL/L (ref 135–147)

## 2019-12-03 ENCOUNTER — CLINICAL SUPPORT (OUTPATIENT)
Dept: CARDIOLOGY | Facility: CLINIC | Age: 79
End: 2019-12-03

## 2019-12-03 DIAGNOSIS — I10 ESSENTIAL HYPERTENSION: Primary | ICD-10-CM

## 2019-12-03 NOTE — PROGRESS NOTES
I reviewed the clinical note and agree with the plan.  Patient to call with any concerns and he will follow-up with me in January 2020.

## 2019-12-03 NOTE — PROGRESS NOTES
Dr. Cabezas patient - Farida ordered bp check and labs (done yesterday at [facility].)  He states these results were faxed to Farida yesterday. (Farida reviewed - labs are good).     Patient returned bp log since 11/27 and all bp readings are elevated, with 154/74 being the lowest and 182/74 being the highes  - HR 58-66.      Of note, patient had allergic reaction to HCTZ (itching), so Lasix 20 mg was added in it's place.  Currently meds Norvasc 10 mg qd; lasix 20 mg qd; Lisinopril 20 mg (2 tabs of the 10 mg tab)/josé manuel      Today bp readings:  Manually:  Right 154/70 Left  168/72  HR 70    Pts. Unit:  Right  183/73 Left 181/76   HR 73    Plan:  Per Farida - follow low salt diet (brochure given to patient).  Increase lisinopril to 40 mg qd.  Advised pt to change batteries in his bp unit, which is an older [employer].  He states he has a large supply of the lisinopril and will take four of the 10 mg tabs until gone but will call back to let Samantha know if a new Rx for the 40 should be called in to local [facility] pharmacy listed./josé manuel

## 2019-12-05 RX ORDER — LISINOPRIL 40 MG/1
40 TABLET ORAL DAILY
Qty: 90 TABLET | Refills: 1 | Status: SHIPPED | OUTPATIENT
Start: 2019-12-05 | End: 2020-04-23 | Stop reason: SDUPTHER

## 2020-01-03 ENCOUNTER — HOSPITAL ENCOUNTER (OUTPATIENT)
Dept: OTHER | Facility: HOSPITAL | Age: 80
Discharge: HOME | End: 2020-01-03
Attending: FAMILY MEDICINE

## 2020-01-03 ENCOUNTER — OFFICE VISIT CONVERTED (OUTPATIENT)
Dept: FAMILY MEDICINE CLINIC | Age: 80
End: 2020-01-03
Attending: FAMILY MEDICINE

## 2020-01-03 LAB
ALBUMIN SERPL-MCNC: 4.4 G/DL (ref 3.5–5)
ALBUMIN/GLOB SERPL: 1.5 {RATIO} (ref 1.4–2.6)
ALP SERPL-CCNC: 86 U/L (ref 56–155)
ALT SERPL-CCNC: 25 U/L (ref 10–40)
ANION GAP SERPL CALC-SCNC: 18 MMOL/L (ref 8–19)
AST SERPL-CCNC: 23 U/L (ref 15–50)
BASOPHILS # BLD MANUAL: 0.03 10*3/UL (ref 0–0.2)
BASOPHILS NFR BLD MANUAL: 0.7 % (ref 0–3)
BILIRUB SERPL-MCNC: 0.89 MG/DL (ref 0.2–1.3)
BUN SERPL-MCNC: 12 MG/DL (ref 5–25)
BUN/CREAT SERPL: 11 {RATIO} (ref 6–20)
CALCIUM SERPL-MCNC: 9.5 MG/DL (ref 8.7–10.4)
CHLORIDE SERPL-SCNC: 99 MMOL/L (ref 99–111)
CHOLEST SERPL-MCNC: 131 MG/DL (ref 107–200)
CHOLEST/HDLC SERPL: 4.1 {RATIO} (ref 3–6)
CONV CO2: 24 MMOL/L (ref 22–32)
CONV TOTAL PROTEIN: 7.3 G/DL (ref 6.3–8.2)
CREAT UR-MCNC: 1.08 MG/DL (ref 0.7–1.2)
DEPRECATED RDW RBC AUTO: 52.4 FL
EOSINOPHIL # BLD MANUAL: 0.07 10*3/UL (ref 0–0.7)
EOSINOPHIL NFR BLD MANUAL: 1.7 % (ref 0–7)
ERYTHROCYTE [DISTWIDTH] IN BLOOD BY AUTOMATED COUNT: 14.1 % (ref 11.5–14.5)
GFR SERPLBLD BASED ON 1.73 SQ M-ARVRAT: >60 ML/MIN/{1.73_M2}
GLOBULIN UR ELPH-MCNC: 2.9 G/DL (ref 2–3.5)
GLUCOSE SERPL-MCNC: 138 MG/DL (ref 70–99)
GRANS (ABSOLUTE): 2.49 10*3/UL (ref 2–8)
GRANS: 59.9 % (ref 30–85)
HBA1C MFR BLD: 12.8 G/DL (ref 14–18)
HCT VFR BLD AUTO: 37 % (ref 42–52)
HDLC SERPL-MCNC: 32 MG/DL (ref 40–60)
IMM GRANULOCYTES # BLD: 0.02 10*3/UL (ref 0–0.54)
IMM GRANULOCYTES NFR BLD: 0.5 % (ref 0–0.43)
LDLC SERPL CALC-MCNC: 83 MG/DL (ref 70–100)
LYMPHOCYTES # BLD MANUAL: 1.1 10*3/UL (ref 1–5)
LYMPHOCYTES NFR BLD MANUAL: 10.8 % (ref 3–10)
MCH RBC QN AUTO: 35.3 PG (ref 27–31)
MCHC RBC AUTO-ENTMCNC: 34.6 G/DL (ref 33–37)
MCV RBC AUTO: 101.9 FL (ref 80–96)
MONOCYTES # BLD AUTO: 0.45 10*3/UL (ref 0.2–1.2)
OSMOLALITY SERPL CALC.SUM OF ELEC: 286 MOSM/KG (ref 273–304)
PLATELET # BLD AUTO: 210 10*3/UL (ref 130–400)
PMV BLD AUTO: 9.4 FL (ref 7.4–10.4)
POTASSIUM SERPL-SCNC: 4 MMOL/L (ref 3.5–5.3)
RBC # BLD AUTO: 3.63 10*6/UL (ref 4.7–6.1)
SODIUM SERPL-SCNC: 137 MMOL/L (ref 135–147)
TRIGL SERPL-MCNC: 81 MG/DL (ref 40–150)
VARIANT LYMPHS NFR BLD MANUAL: 26.4 % (ref 20–45)
VLDLC SERPL-MCNC: 16 MG/DL (ref 5–37)
WBC # BLD AUTO: 4.16 10*3/UL (ref 4.8–10.8)

## 2020-01-09 ENCOUNTER — OFFICE VISIT (OUTPATIENT)
Dept: CARDIOLOGY | Facility: CLINIC | Age: 80
End: 2020-01-09

## 2020-01-09 VITALS
HEART RATE: 59 BPM | DIASTOLIC BLOOD PRESSURE: 70 MMHG | SYSTOLIC BLOOD PRESSURE: 160 MMHG | WEIGHT: 188.6 LBS | HEIGHT: 70 IN | BODY MASS INDEX: 27 KG/M2

## 2020-01-09 DIAGNOSIS — I10 ESSENTIAL HYPERTENSION: Primary | ICD-10-CM

## 2020-01-09 DIAGNOSIS — E78.2 MIXED HYPERLIPIDEMIA: ICD-10-CM

## 2020-01-09 DIAGNOSIS — Q24.5 CORONARY-MYOCARDIAL BRIDGE: ICD-10-CM

## 2020-01-09 PROBLEM — R35.1 NOCTURIA: Status: ACTIVE | Noted: 2020-01-09

## 2020-01-09 PROBLEM — M00.9 PYOGENIC ARTHRITIS OF LEFT KNEE JOINT (HCC): Status: RESOLVED | Noted: 2019-10-05 | Resolved: 2020-01-09

## 2020-01-09 PROCEDURE — 99214 OFFICE O/P EST MOD 30 MIN: CPT | Performed by: NURSE PRACTITIONER

## 2020-01-09 PROCEDURE — 93000 ELECTROCARDIOGRAM COMPLETE: CPT | Performed by: NURSE PRACTITIONER

## 2020-01-09 NOTE — PROGRESS NOTES
Date of Office Visit: 2020  Encounter Provider: JANNY Peters  Place of Service: [facility] CARDIOLOGY  Patient Name: Quincy Roberts  :1940  Primary Cardiologist: Dr. Elmira Cabezas    Chief Complaint   Patient presents with   • Hypertension   • Follow-up   :     Dear Dr. Valle,     HPI: Quincy Roberts is a pleasant 79 y.o. male who presents today for follow-up of his blood pressure.  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    In 2002, he had a cardiac catheterization which showed myocardial bridging, but no significant stenosis.  In May 2013, he had a nuclear stress test which showed no evidence of ischemia.  In 2015, he was diagnosed with prostate cancer and underwent radiation therapy.  He has been diagnosed with hypertension and was unable to tolerate carvedilol in the past.      In 2019, he said he was dragging and his blood pressure was low at 108/54.  He had been taking doxazosin for urination and blood pressure and it was discontinued.    In 2019, he was hospitalized for left knee swelling and found to have a large left knee effusion.  He was treated with IV vancomycin and underwent left knee arthrocentesis with 110 mL bloody synovial fluid that was removed.  I reviewed his blood work from that hospitalization which included a CBC which showed hemoglobin of 12.1 and hematocrit 36.8.  CMP normal, except for elevated glucose of 176.    In 2019, he followed up with me for his annual visit.  He continued to have some residual left knee swelling and I recommended that he follow-up with his orthopedic surgeon.  His blood pressure was elevated at 148/70 off the doxazosin. I recommended that he start hydrochlorothiazide 25 mg 1 tablet daily and increase amlodipine to 10 mg daily.  He was unable to tolerate the hydrochlorothiazide due to itching so it was discontinued.  His PCP Dr. Valle started him  on furosemide.    On 12/3/2019, he came to our office for a blood pressure check and manually it was 154/70 and 168/72.  His blood pressure machine ran much higher at 183/73.  I recommended that he follow a low-sodium diet and increased his lisinopril to 40 mg daily.  He had a follow-up BMP on 1/3/2020 which showed normal kidney function and potassium.    He presents today for his follow-up visit.  His blood pressure is elevated today at 160/70 and according to his machine his BP is averaging 143/72.  He thinks that he should go back on his doxazosin for better blood pressure control and because of nocturia.  He went to see his orthopedic surgeon. His left knee was drained again and his swelling has very much improved.  He now has trace ankle edema.  He denies chest pain, shortness of air, PND, orthopnea, palpitations, dizziness, syncope, or bleeding.    I requested his last blood work from his PCP office dated 9/3/2019: CMP showed normal kidney function and potassium.  Total cholesterol 131, triglycerides 81, LDL 83, and HDL 32.  CBC showed normal hemoglobin and hematocrit.  I also have a BMP from 12/2/2019 which was normal except for glucose of 116.    Past Medical History:   Diagnosis Date   • Anemia    • Diverticulosis    • GERD (gastroesophageal reflux disease)    • Health care maintenance    • History of colon polyps    • History of jaundice     Childhood   • History of prostate cancer 2014   • History of radiation therapy    • Hyperlipidemia    • Hypertension    • Kidney stones    • Osteoarthritis    • Prostate cancer (CMS/HCC) 2014   • Pyogenic arthritis of left knee joint (CMS/HCC)        Past Surgical History:   Procedure Laterality Date   • COLONOSCOPY N/A 8/10/2018    Procedure: COLONOSCOPY INTO CECUM AND TERMINAL ILEUM;  Surgeon: Valentino Stern MD;  Location: [facility] ENDOSCOPY;  Service: Gastroenterology   • ENDOSCOPY N/A 8/2/2017    Procedure: ESOPHAGOGASTRODUODENOSCOPY WITH COLD BIOPSIES;   Surgeon: Valentino PEÑA MD;  Location: [facility] ENDOSCOPY;  Service:    • HERNIA REPAIR  2010   • ROTATOR CUFF REPAIR Left 2003   • TOTAL HIP ARTHROPLASTY Right 2011       Social History     Socioeconomic History   • Marital status:      Spouse name: Kassidy   • Number of children: Not on file   • Years of education: High School   • Highest education level: Not on file   Occupational History   • Occupation:      Employer: SELF-EMPLOYED   Tobacco Use   • Smoking status: Never Smoker   • Smokeless tobacco: Never Used   Substance and Sexual Activity   • Alcohol use: No     Comment: caffeine use   • Drug use: No   • Sexual activity: Defer       Family History   Problem Relation Age of Onset   • Breast cancer Mother 70   • Dementia Mother    • Malig Hyperthermia Neg Hx        The following portion of the patient's history were reviewed and updated as appropriate: past medical history, past surgical history, past social history, past family history, allergies, current medications, and problem list.    Review of Systems   Constitution: Negative for chills, diaphoresis, fever, malaise/fatigue, night sweats, weight gain and weight loss.   HENT: Negative for hearing loss, nosebleeds, sore throat and tinnitus.    Eyes: Negative for blurred vision, double vision, pain and visual disturbance.   Cardiovascular: Positive for leg swelling. Negative for chest pain, claudication, cyanosis, dyspnea on exertion, irregular heartbeat, near-syncope, orthopnea, palpitations, paroxysmal nocturnal dyspnea and syncope.   Respiratory: Negative for cough, hemoptysis, shortness of breath, snoring and wheezing.    Endocrine: Negative for cold intolerance, heat intolerance and polyuria.   Hematologic/Lymphatic: Negative for bleeding problem. Does not bruise/bleed easily.   Skin: Negative for color change, dry skin, flushing and itching.   Musculoskeletal: Negative for falls, joint pain, joint swelling, muscle cramps, muscle  weakness and myalgias.   Gastrointestinal: Negative for abdominal pain, constipation, heartburn, melena, nausea and vomiting.   Genitourinary: Positive for frequency. Negative for dysuria and hematuria.   Neurological: Negative for excessive daytime sleepiness, dizziness, light-headedness, loss of balance, numbness, paresthesias, seizures and vertigo.   Psychiatric/Behavioral: Negative for altered mental status, depression, memory loss and substance abuse. The patient does not have insomnia and is not nervous/anxious.    Allergic/Immunologic: Negative for environmental allergies.       Allergies   Allergen Reactions   • Metoclopramide Anaphylaxis   • Carvedilol Other (See Comments)     Intolerant   • Hctz [Hydrochlorothiazide] Itching         Current Outpatient Medications:   •  Acetaminophen (TYLENOL PO), Take 1 tablet by mouth As Needed., Disp: , Rfl:   •  amLODIPine (NORVASC) 7.5 MG tablet, Every Morning., Disp: 90 tablet, Rfl: 1  •  Cholecalciferol (VITAMIN D3 PO), Take 1,000 mg by mouth Daily., Disp: , Rfl:   •  coenzyme Q10 50 MG capsule capsule, Take  by mouth Daily., Disp: , Rfl:   •  Cyanocobalamin (B-12 PO), Take 750 mcg by mouth., Disp: , Rfl:   •  esomeprazole (NEXIUM) 40 MG capsule, Take 40 mg by mouth Every Morning Before Breakfast., Disp: , Rfl:   •  fluticasone (FLONASE) 50 MCG/ACT nasal spray, 2 sprays into the nostril(s) as directed by provider Daily., Disp: , Rfl:   •  FOLIC ACID PO, Take 100 mcg by mouth., Disp: , Rfl:   •  Lactobacillus (PROBIOTIC ACIDOPHILUS PO), Take 1 tablet by mouth Daily., Disp: , Rfl:   •  Omega-3 Fatty Acids (FISH OIL) 1000 MG capsule capsule, Take 1,000 mg by mouth Daily With Breakfast., Disp: , Rfl:   •  Pyridoxine HCl (VITAMIN B6 PO), Take 30 mg by mouth., Disp: , Rfl:   •  TURMERIC PO, Take 250 mg by mouth., Disp: , Rfl:         Objective:     Vitals:    01/09/20 1124 01/09/20 1126   BP: 160/70 160/70   BP Location: Left arm Left arm   Pulse:  59   Weight: 85.5 kg  "(188 lb 9.6 oz)    Height: 177.8 cm (70\")      Body mass index is 27.06 kg/m².    PHYSICAL EXAM:    Vitals Reviewed.   General Appearance: No acute distress, well developed and well nourished.    Eyes: Conjunctiva and lids: No erythema, swelling, or discharge. Sclera non-icteric. Wears glasses.   HENT: Atraumatic, normocephalic. External eyes, ears, and nose normal. No hearing loss noted. Mucous membranes normal. Lips not cyanotic. Neck supple with no tenderness.  Respiratory: No signs of respiratory distress. Respiration rhythm and depth normal.   Clear to auscultation. No rales, crackles, rhonchi, or wheezing auscultated.   Cardiovascular:  Jugular Venous Pressure: Normal  Heart Rate and Rhythm: Normal rhythm; bradycardic.  Heart Sounds: Normal S1 and S2. No S3 or S4 noted.  Murmurs: No murmurs noted. No rubs, thrills, or gallops.   Lower Extremities: Bilateral trace lower extremity edema.   Gastrointestinal:  Abdomen soft, non-distended, non-tender. Normal bowel sounds. No hepatomegaly.   Musculoskeletal: Normal movement of extremities  Skin and Nails: General appearance normal. No pallor, cyanosis, diaphoresis. Skin temperature normal. No clubbing of fingernails.   Psychiatric: Patient alert and oriented to person, place, and time. Speech and behavior appropriate. Normal mood and affect.       ECG 12 Lead  Date/Time: 1/9/2020 11:28 AM  Performed by: Farida Barrientos APRN  Authorized by: Farida Barrientos APRN   Comparison: compared with previous ECG from 11/26/2019  Similar to previous ECG  Rhythm: sinus rhythm  Rate: bradycardic  BPM: 59  Conduction: conduction normal  ST Segments: ST segments normal  T flattening: aVL  QRS axis: normal  Other: no other findings    Clinical impression: normal ECG              Assessment:       Diagnosis Plan   1. Essential hypertension  ECG 12 Lead   2. Mixed hyperlipidemia     3. Coronary-myocardial bridge            Plan:       1.  Hypertension: Blood pressure is elevated " today and has been since he stopped the doxazosin. He is going to restart the doxazosin 4 mg daily (that is the dose he has at home). He will check his blood pressure at home and call me in one month. To note: his blood pressure machine is not always accurate so he may need to follow up in his local PCP office.     2.  Hyperlipidemia: Recent cholesterol panel looked good.    3.  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  Denies anginal symptoms.    4.  I have recommended follow-up with Dr. Cabezas in 1 year, unless otherwise needed sooner.     As always, it has been a pleasure to participate in your patient's care. Thank you.       Sincerely,         JANNY Baldwin        Dictated utilizing Dragon dictation

## 2020-01-10 PROBLEM — Q24.5 CORONARY-MYOCARDIAL BRIDGE: Status: ACTIVE | Noted: 2020-01-10

## 2020-01-13 ENCOUNTER — HOSPITAL ENCOUNTER (OUTPATIENT)
Dept: SURGERY | Facility: HOSPITAL | Age: 80
Setting detail: HOSPITAL OUTPATIENT SURGERY
Discharge: HOME | End: 2020-01-13
Attending: OPHTHALMOLOGY

## 2020-01-17 ENCOUNTER — OFFICE VISIT (OUTPATIENT)
Dept: ONCOLOGY | Facility: CLINIC | Age: 80
End: 2020-01-17

## 2020-01-17 ENCOUNTER — LAB (OUTPATIENT)
Dept: OTHER | Facility: HOSPITAL | Age: 80
End: 2020-01-17

## 2020-01-17 VITALS
OXYGEN SATURATION: 94 % | DIASTOLIC BLOOD PRESSURE: 72 MMHG | SYSTOLIC BLOOD PRESSURE: 152 MMHG | HEART RATE: 72 BPM | HEIGHT: 70 IN | BODY MASS INDEX: 26.92 KG/M2 | RESPIRATION RATE: 16 BRPM | TEMPERATURE: 97.7 F | WEIGHT: 188 LBS

## 2020-01-17 DIAGNOSIS — D53.9 MACROCYTIC ANEMIA: Primary | ICD-10-CM

## 2020-01-17 DIAGNOSIS — D53.9 MACROCYTIC ANEMIA: ICD-10-CM

## 2020-01-17 LAB
BASOPHILS # BLD AUTO: 0.03 10*3/MM3 (ref 0–0.2)
BASOPHILS NFR BLD AUTO: 0.8 % (ref 0–1.5)
DEPRECATED RDW RBC AUTO: 51.3 FL (ref 37–54)
EOSINOPHIL # BLD AUTO: 0.07 10*3/MM3 (ref 0–0.4)
EOSINOPHIL NFR BLD AUTO: 1.8 % (ref 0.3–6.2)
ERYTHROCYTE [DISTWIDTH] IN BLOOD BY AUTOMATED COUNT: 14.1 % (ref 12.3–15.4)
HCT VFR BLD AUTO: 36.1 % (ref 37.5–51)
HGB BLD-MCNC: 12.8 G/DL (ref 13–17.7)
HGB RETIC QN AUTO: 38.1 PG (ref 29.8–36.1)
IMM GRANULOCYTES # BLD AUTO: 0.02 10*3/MM3 (ref 0–0.05)
IMM GRANULOCYTES NFR BLD AUTO: 0.5 % (ref 0–0.5)
IMM RETICS NFR: 17.2 % (ref 3–15.8)
LYMPHOCYTES # BLD AUTO: 1 10*3/MM3 (ref 0.7–3.1)
LYMPHOCYTES NFR BLD AUTO: 25.2 % (ref 19.6–45.3)
MCH RBC QN AUTO: 35.7 PG (ref 26.6–33)
MCHC RBC AUTO-ENTMCNC: 35.5 G/DL (ref 31.5–35.7)
MCV RBC AUTO: 100.6 FL (ref 79–97)
MONOCYTES # BLD AUTO: 0.47 10*3/MM3 (ref 0.1–0.9)
MONOCYTES NFR BLD AUTO: 11.8 % (ref 5–12)
NEUTROPHILS # BLD AUTO: 2.38 10*3/MM3 (ref 1.7–7)
NEUTROPHILS NFR BLD AUTO: 59.9 % (ref 42.7–76)
NRBC BLD AUTO-RTO: 0 /100 WBC (ref 0–0.2)
PLATELET # BLD AUTO: 196 10*3/MM3 (ref 140–450)
PMV BLD AUTO: 10 FL (ref 6–12)
RBC # BLD AUTO: 3.59 10*6/MM3 (ref 4.14–5.8)
RETICS/RBC NFR AUTO: 3.04 % (ref 0.7–1.9)
WBC NRBC COR # BLD: 3.97 10*3/MM3 (ref 3.4–10.8)

## 2020-01-17 PROCEDURE — 85025 COMPLETE CBC W/AUTO DIFF WBC: CPT | Performed by: INTERNAL MEDICINE

## 2020-01-17 PROCEDURE — 85046 RETICYTE/HGB CONCENTRATE: CPT | Performed by: INTERNAL MEDICINE

## 2020-01-17 PROCEDURE — 36415 COLL VENOUS BLD VENIPUNCTURE: CPT

## 2020-01-17 PROCEDURE — 99213 OFFICE O/P EST LOW 20 MIN: CPT | Performed by: INTERNAL MEDICINE

## 2020-01-17 NOTE — PROGRESS NOTES
[facility] CBC GROUP OUTPATIENT FOLLOW UP CLINIC VISIT    REASON FOR FOLLOW-UP:    Normocytic anemia    HISTORY OF PRESENT ILLNESS:  Quincy Roberts is a 79 y.o. male who returns today for follow up of the above issue.      Since he was last seen here he developed a left knee effusion.  This resolved and his left knee is doing well now at this point.  He remains very active on the farm.  He denies any bleeding.  No fevers or chills.  His blood pressure has been an issue, usually too high but he did have one morning when it dropped acutely and he became hypotensive.  His blood pressure is a little better today.    HEMATOLOGIC HISTORY:  He had labs on 11/2/2018 showing a white blood cell count 3.52 with hemoglobin 11.9, .5, and platelets 175,000.  Creatinine was normal at 1.05  with a calcium of 9.3.  His reticulocyte count was 2.59%.  Folic acid was greater than 20 and vitamin B12 greater than 2000.  He is on a vitamin B12 supplement.  Follow-up labs on 1/8/2019 showed hemoglobin 11.9 with hematocrit 33.2%, , platelets 207,000, white blood cell count 3.95.  He was referred for further evaluation.     He denies any history of hematologic disorders.  He denies any bleeding.  He states that he has frequent colonoscopies due to a history of polyps.  He generally feels well.     He has a history of prostate cancer status post radiation around 2005 and a history of right hip replacement in January 2011.    He was seen initially on 2/8/2019.  Labs were unremarkable.  Ferritin and iron profile were normal.  Folic acid and vitamin B12 levels were normal.  Creatinine was normal at 1.04.  LDH was normal.  Serum protein electrophoresis with immunofixation were unremarkable.        ALLERGIES:  Allergies   Allergen Reactions   • Metoclopramide Anaphylaxis   • Carvedilol Unknown - Low Severity     Intolerant   • Hctz [Hydrochlorothiazide] Itching       MEDICATIONS:  The medication list has been reviewed with the  "patient by the medical assistant, and the list has been updated in the electronic medical record, which I reviewed.  Medication dosages and frequencies were confirmed to be accurate.    REVIEW OF SYSTEMS:  PAIN:  See Vital Signs below.  GENERAL:  No fevers, chills, night sweats, or unintended weight loss.  SKIN:  No rashes or non-healing lesions.  Healing incision on the left side of his neck.  HEME/LYMPH:  No abnormal bleeding.  No palpable lymphadenopathy.  EYES:  No vision changes or diplopia.  ENT:  No sore throat or difficulty swallowing.  RESPIRATORY:  No cough, shortness of breath, hemoptysis, or wheezing.  CARDIOVASCULAR:  No chest pain, palpitations, orthopnea, or dyspnea on exertion.  GASTROINTESTINAL:  No abdominal pain, nausea, vomiting, constipation, diarrhea, melena, or hematochezia.  GENITOURINARY:  No dysuria or hematuria.  Increased urinary frequency.  MUSCULOSKELETAL:  No joint pain, swelling, or erythema.  Left knee effusion resolved.  NEUROLOGIC:  No dizziness, loss of consciousness, or seizures.  PSYCHIATRIC:  No depression, anxiety, or mood changes.    Vitals:    01/17/20 0849   BP: 152/72   Pulse: 72   Resp: 16   Temp: 97.7 °F (36.5 °C)   TempSrc: Oral   SpO2: 94%   Weight: 85.3 kg (188 lb)   Height: 177.8 cm (70\")   PainSc: 0-No pain  Comment: anemia       PHYSICAL EXAMINATION:  GENERAL:  Well-developed well-nourished male; awake, alert and oriented, in no acute distress.  SKIN:  Warm and dry, without rashes, purpura, or petechiae.    HEAD:  Normocephalic, atraumatic.  EARS:  Hearing intact.  MOUTH:  No stomatitis or ulcers.  Lips are normal.  THROAT:  Oropharynx without lesions or exudates.  LYMPHATICS:  No cervical, supraclavicular, or axillary lymphadenopathy.  CHEST:  Lungs are clear to auscultation bilaterally.  No wheezes, rales, or rhonchi.  HEART:  Regular rate; normal rhythm.  No murmurs, gallops or rubs.  EXTREMITIES:  No clubbing cyanosis or edema.  NEUROLOGICAL:  No focal " neurologic deficits.    DIAGNOSTIC DATA:  Results for orders placed or performed in visit on 01/17/20   Retic With IRF & RET-He   Result Value Ref Range    Immature Reticulocyte Fraction 17.2 (H) 3.0 - 15.8 %    Reticulocyte % 3.04 (H) 0.70 - 1.90 %    Reticulocyte Hgb 38.1 (H) 29.8 - 36.1 pg   CBC Auto Differential   Result Value Ref Range    WBC 3.97 3.40 - 10.80 10*3/mm3    RBC 3.59 (L) 4.14 - 5.80 10*6/mm3    Hemoglobin 12.8 (L) 13.0 - 17.7 g/dL    Hematocrit 36.1 (L) 37.5 - 51.0 %    .6 (H) 79.0 - 97.0 fL    MCH 35.7 (H) 26.6 - 33.0 pg    MCHC 35.5 31.5 - 35.7 g/dL    RDW 14.1 12.3 - 15.4 %    RDW-SD 51.3 37.0 - 54.0 fl    MPV 10.0 6.0 - 12.0 fL    Platelets 196 140 - 450 10*3/mm3    Neutrophil % 59.9 42.7 - 76.0 %    Lymphocyte % 25.2 19.6 - 45.3 %    Monocyte % 11.8 5.0 - 12.0 %    Eosinophil % 1.8 0.3 - 6.2 %    Basophil % 0.8 0.0 - 1.5 %    Immature Grans % 0.5 0.0 - 0.5 %    Neutrophils, Absolute 2.38 1.70 - 7.00 10*3/mm3    Lymphocytes, Absolute 1.00 0.70 - 3.10 10*3/mm3    Monocytes, Absolute 0.47 0.10 - 0.90 10*3/mm3    Eosinophils, Absolute 0.07 0.00 - 0.40 10*3/mm3    Basophils, Absolute 0.03 0.00 - 0.20 10*3/mm3    Immature Grans, Absolute 0.02 0.00 - 0.05 10*3/mm3    nRBC 0.0 0.0 - 0.2 /100 WBC       IMAGING:  None reviewed    ASSESSMENT:  This is a 79 y.o. male with:  1.  Normocytic to macrocytic anemia: This has been mild.  I did not discover an etiology for this on laboratory evaluation.  His reticulocyte count remains a little elevated.  Bone marrow biopsy if this worsens.      2.  Leukopenia: This was mild and he really is not leukopenic today.    3.  History of prostate cancer status post radiation    4.  History of skin cancer resected from the left side of his neck.  He states this was a melanoma.  We have no records regarding this.      PLAN:  1.  As his blood counts are stable and primary care follows him with labs every 6 months, he can follow-up with primary care and we can  certainly see him back as needed for worsening cytopenias or any other issues.  The patient is comfortable with this approach.

## 2020-01-22 ENCOUNTER — TELEPHONE (OUTPATIENT)
Dept: CARDIOLOGY | Facility: CLINIC | Age: 80
End: 2020-01-22

## 2020-01-22 RX ORDER — DOXAZOSIN MESYLATE 4 MG/1
4 TABLET ORAL NIGHTLY
COMMUNITY
End: 2020-01-29

## 2020-01-22 NOTE — TELEPHONE ENCOUNTER
"01/22/20  10:30 AM  Quincy Roberts  1940  Home Phone [phone]   Mobile [phone]     Quincy Leonard called this morning. He said he doesn't feel good and he is \"jittery inside\". His B/P has still been elevated. He gave me several readings and they were all 170s/70s.    Last night: 171/76  This morning two hours after meds:173/77, HR 82    Cardiac-related medication:  Lisinopril 40 mg daily  Furosemide 20 mg daily  Amlodipine 10 mg daily  Doxazosin 4 mg daily    Do you have recommendations for him?    Thank you!    Dolly Mckinney RN  Triage [facility]    "

## 2020-01-23 RX ORDER — NEBIVOLOL 5 MG/1
5 TABLET ORAL DAILY
Qty: 30 TABLET | Refills: 1 | Status: SHIPPED | OUTPATIENT
Start: 2020-01-23 | End: 2020-02-27 | Stop reason: DRUGHIGH

## 2020-01-23 NOTE — TELEPHONE ENCOUNTER
I spoke with patient. Dr. Cabezas recommended starting Bystolic 5 mg daily at 5 pm since his BP is high at night. The cardura is making him feel jittery so I told him to stop the cardura.     I will call and check on him next week.

## 2020-01-23 NOTE — TELEPHONE ENCOUNTER
Have you both discussed this already?     Thank you!    Dolly Mckinney RN  Triage [facility]

## 2020-01-29 ENCOUNTER — TELEPHONE (OUTPATIENT)
Dept: CARDIOLOGY | Facility: CLINIC | Age: 80
End: 2020-01-29

## 2020-01-29 NOTE — TELEPHONE ENCOUNTER
I spoke with him via phone. /67 at MD office. Yesterday it was 137/70. 144/67 last night. Today 157/72.    I recommended increasing the bystolic 5 mg twice daily. I will check on him next week.

## 2020-01-29 NOTE — TELEPHONE ENCOUNTER
----- Message from JANNY Andrade sent at 1/23/2020  5:20 PM EST -----    Call next week to check on him

## 2020-02-05 ENCOUNTER — TELEPHONE (OUTPATIENT)
Dept: CARDIOLOGY | Facility: CLINIC | Age: 80
End: 2020-02-05

## 2020-02-05 RX ORDER — SPIRONOLACTONE 25 MG/1
25 TABLET ORAL DAILY
Qty: 90 TABLET | Refills: 3 | Status: SHIPPED | OUTPATIENT
Start: 2020-02-05 | End: 2020-02-05

## 2020-02-05 RX ORDER — SPIRONOLACTONE 25 MG/1
25 TABLET ORAL DAILY
Qty: 30 TABLET | Refills: 3 | Status: SHIPPED | OUTPATIENT
Start: 2020-02-05 | End: 2020-07-16

## 2020-02-05 NOTE — TELEPHONE ENCOUNTER
Spoke with patient. Went over recommendations. He verbalized understanding.    Thank you!    Dolly Mckinney, RN  Triage [facility]

## 2020-02-05 NOTE — TELEPHONE ENCOUNTER
02/05/20  9:08 AM  Quincy Roberts  1940  Home Phone [phone]   Mobile [phone]     Quincy Leonard called this morning. He has been working with Farida to adjust his B/P meds. His B/P is still elevated.     2-3-20 149/69  2-4-20 165/81, 181/79, 204/72, HR 49  2-5-20 178/74, HR 50    Cardiac-related medications:  Bystolic 5 mg BID  Lisinopril 40 mg daily  Furosemide 20 mg daily  Amlodipine 10 mg daily    Do you have recommendations for him?    Thank you!    Dolly Mckinney, GUY  Triage [facility]

## 2020-02-07 ENCOUNTER — TELEPHONE (OUTPATIENT)
Dept: CARDIOLOGY | Facility: CLINIC | Age: 80
End: 2020-02-07

## 2020-02-07 NOTE — TELEPHONE ENCOUNTER
----- Message from JANNY Andrade sent at 2/2/2020  4:10 PM EST -----    Follow up with him about BP

## 2020-02-07 NOTE — TELEPHONE ENCOUNTER
I was going to call him to check on his blood pressure and just noticed that he called in on 2/5/2020 about his blood pressure.    Richard-please make a 2-week follow-up appointment with me.  Ask him to bring his blood pressure machine in so we can double check it for accuracy.  Thank you

## 2020-02-07 NOTE — TELEPHONE ENCOUNTER
Does Dr. Cabezas have any openings next week to address his blood pressure?  If seen in the last couple of times in his blood pressure really is not bulging.  I would like her opinion.  Thank you

## 2020-02-07 NOTE — TELEPHONE ENCOUNTER
02/07/20  3:08 PM  Quincy Roberts  1940  Home Phone [phone]   Mobile [phone]       Quincy Roberts is a patient of Dr Mon and Farida.  He is calling in to let you know that his bp is still elevated.      Left arm 183/70 right arm 175/76 hr 55- he had this checked at his PCP office.  He is feeling flushed, with a slight headache.  He denies using salt, or any new stress in his life.      Cardiac meds reviewed  Spironolactone 25mg daily  bystolic 5mg BID- he is asking if you plan on changing this medication as previously discussed.  He said it is a ngozi 1 drug and it costs him $435/month.  He will run out of this rx on Tuesday.  Lisinopril 40mg daily.  Furosemide 20mg daily  Amlodipine 10mg every am    Farida,  Please let me know how you would like to proceed  Thanks  Zeina Johnson RN  Triage nurse

## 2020-02-12 ENCOUNTER — OFFICE VISIT (OUTPATIENT)
Dept: CARDIOLOGY | Facility: CLINIC | Age: 80
End: 2020-02-12

## 2020-02-12 VITALS
HEART RATE: 52 BPM | HEIGHT: 70 IN | DIASTOLIC BLOOD PRESSURE: 80 MMHG | BODY MASS INDEX: 27.49 KG/M2 | SYSTOLIC BLOOD PRESSURE: 170 MMHG | WEIGHT: 192 LBS

## 2020-02-12 DIAGNOSIS — I10 ESSENTIAL HYPERTENSION: Primary | ICD-10-CM

## 2020-02-12 DIAGNOSIS — R01.1 HEART MURMUR: ICD-10-CM

## 2020-02-12 DIAGNOSIS — E78.2 MIXED HYPERLIPIDEMIA: ICD-10-CM

## 2020-02-12 PROCEDURE — 99214 OFFICE O/P EST MOD 30 MIN: CPT | Performed by: INTERNAL MEDICINE

## 2020-02-12 RX ORDER — DOXAZOSIN 2 MG/1
2 TABLET ORAL NIGHTLY
Qty: 90 TABLET | Refills: 3 | Status: SHIPPED | OUTPATIENT
Start: 2020-02-12 | End: 2020-11-17 | Stop reason: DRUGHIGH

## 2020-02-12 NOTE — PROGRESS NOTES
Date of Office Visit: 2020  Encounter Provider: Elmira Cabezas MD  Place of Service: [facility] CARDIOLOGY  Patient Name: Quincy Roberts  :1940      Patient ID:  Quincy Roberts is a 79 y.o. male is here for  followup for hypertension.         History of Present Illness    I first saw in the chest pain unit at  [facility], [place], operated by [facility].  He presented there with exertional chest pain, short-windedness,  and fatigue.  He went to the emergency department and his blood pressure was very high at  about 200/120.  He had not had that issue in quite some time.  Because of his chest pain,  we did an ischemic workup.  He ruled out for a myocardial infarction and had a stress  nuclear perfusion study done on January 10, 2013, which showed no ischemia.  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  During his hospitalization, he also had a lipid panel done on  January 10, 2013, which showed triglycerides of 59, HDL of 31, LDL of 82, and total  cholesterol of 125.     He did have some left upper quadrant pain and for  that he saw Dr. Stern.  He subsequently had an EGD.  He also had a CT of his abdomen  and pelvis.  The EGD looked fine.  The CT of the abdomen and pelvis suggested diverticular  disease.  He then had a colonoscopy performed and 7 polyps were removed, and I think they  have been treating him for the diverticular disease. His last EGD was 2017 and was normal.         He was diagnosed with prostate cancer in 2015. He sees Dr. Garcia  at [facility]. He underwent radiation therapy for that and it has helped, not only the  prostatic hypertrophy, but the prostate cancer.     In 2019, he said he was dragging and his blood pressure was low at 108/54.  He had been taking doxazosin for urination and blood pressure and it was discontinued.     In 2019, he was hospitalized for  left knee swelling and found to have a large left knee effusion.  He was treated with IV vancomycin and underwent left knee arthrocentesis with 110 mL bloody synovial fluid that was removed.\     In November 2019, he followed up with me for his annual visit.  He continued to have some residual left knee swelling and I recommended that he follow-up with his orthopedic surgeon.  His blood pressure was elevated at 148/70 off the doxazosin. I recommended that he start hydrochlorothiazide 25 mg 1 tablet daily and increase amlodipine to 10 mg daily.  He was unable to tolerate the hydrochlorothiazide due to itching so it was discontinued.  His PCP Dr. Valle started him on furosemide.     On 12/3/2019, he came to our office for a blood pressure check and manually it was 154/70 and 168/72.  His blood pressure machine ran much higher at 183/73.  I recommended that he follow a low-sodium diet and increased his lisinopril to 40 mg daily.  He had a follow-up BMP on 1/3/2020 which showed normal kidney function and potassium.     Labs from 9/3/2019: CMP showed normal kidney function and potassium.  Total cholesterol 131, triglycerides 81, LDL 83, and HDL 32.  CBC showed normal hemoglobin and hematocrit.  I also have a BMP from 12/2/2019 which was normal except for glucose of 116.    He returned with elevated blood pressure.  Labs done 1/17/2020 show normal CBC.  Blood pressure at home runs 130s to 160s over 70s.  He has had difficulty with hydrochlorothiazide and carvedilol.  At one point he was on doxazosin at night and seem to control his blood pressure well.  He has had no abdominal pain but has neck pain.  He got a cortisone injection in his knee on 1/10/2020.  He has had no exertional chest tightness or pressure.  He has no orthopnea or PND.  He does get a headache and flushing with his blood pressure being up.  He is had no diarrhea and has had no syncope.    Past Medical History:   Diagnosis Date   • Anemia    •  Diverticulosis    • GERD (gastroesophageal reflux disease)    • Health care maintenance    • History of colon polyps    • History of jaundice     Childhood   • History of prostate cancer 2014   • History of radiation therapy    • Hyperlipidemia    • Hypertension    • Kidney stones    • Osteoarthritis    • Prostate cancer (CMS/HCC) 2014   • Pyogenic arthritis of left knee joint (CMS/HCC)          Past Surgical History:   Procedure Laterality Date   • COLONOSCOPY N/A 8/10/2018    Procedure: COLONOSCOPY INTO CECUM AND TERMINAL ILEUM;  Surgeon: Valentino Stern MD;  Location: [facility] ENDOSCOPY;  Service: Gastroenterology   • ENDOSCOPY N/A 8/2/2017    Procedure: ESOPHAGOGASTRODUODENOSCOPY WITH COLD BIOPSIES;  Surgeon: Valentino PEÑA MD;  Location: [facility] ENDOSCOPY;  Service:    • HERNIA REPAIR  2010   • ROTATOR CUFF REPAIR Left 2003   • TOTAL HIP ARTHROPLASTY Right 2011       Current Outpatient Medications on File Prior to Visit   Medication Sig Dispense Refill   • Acetaminophen (TYLENOL PO) Take 1 tablet by mouth As Needed.     • amLODIPine (NORVASC) 10 MG tablet Take 1 tablet by mouth Every Morning. 90 tablet 1   • Cholecalciferol (VITAMIN D3 PO) Take 1,000 mg by mouth Daily.     • coenzyme Q10 50 MG capsule capsule Take  by mouth Daily.     • esomeprazole (NEXIUM) 40 MG capsule Take 40 mg by mouth Every Morning Before Breakfast.     • fluticasone (FLONASE) 50 MCG/ACT nasal spray 2 sprays into the nostril(s) as directed by provider Daily.     • furosemide (LASIX) 20 MG tablet Take 1 tablet by mouth Daily. 30 tablet 11   • Lactobacillus (PROBIOTIC ACIDOPHILUS PO) Take 1 tablet by mouth Daily.     • lisinopril (PRINIVIL,ZESTRIL) 40 MG tablet Take 1 tablet by mouth Daily. 90 tablet 1   • Misc Natural Products (OSTEO BI-FLEX/5-LOXIN ADVANCED PO) Take 1 tablet by mouth Daily.     • nebivolol (BYSTOLIC) 5 MG tablet Take 1 tablet by mouth Daily. 30 tablet 1   • Omega-3 Fatty Acids (FISH OIL) 1000 MG capsule capsule  Take 1,000 mg by mouth Daily With Breakfast. Omega Q plus     • spironolactone (ALDACTONE) 25 MG tablet Take 1 tablet by mouth Daily. 30 tablet 3   • [DISCONTINUED] Cyanocobalamin (B-12 PO) Take 750 mcg by mouth.     • [DISCONTINUED] FOLIC ACID PO Take 100 mcg by mouth.     • [DISCONTINUED] Pyridoxine HCl (VITAMIN B6 PO) Take 30 mg by mouth.     • [DISCONTINUED] TURMERIC PO Take 250 mg by mouth.       No current facility-administered medications on file prior to visit.        Social History     Socioeconomic History   • Marital status:      Spouse name: Kassidy   • Number of children: Not on file   • Years of education: High School   • Highest education level: Not on file   Occupational History   • Occupation:      Employer: SELF-EMPLOYED   Tobacco Use   • Smoking status: Never Smoker   • Smokeless tobacco: Never Used   Substance and Sexual Activity   • Alcohol use: No     Comment: caffeine use   • Drug use: No   • Sexual activity: Defer           Review of Systems   Constitution: Negative.   HENT: Negative for congestion.    Eyes: Negative for vision loss in left eye and vision loss in right eye.   Respiratory: Negative.  Negative for cough, hemoptysis, shortness of breath, sleep disturbances due to breathing, snoring, sputum production and wheezing.    Endocrine: Negative.    Hematologic/Lymphatic: Negative.    Skin: Negative for poor wound healing and rash.   Musculoskeletal: Negative for falls, gout, muscle cramps and myalgias.   Gastrointestinal: Negative for abdominal pain, diarrhea, dysphagia, hematemesis, melena, nausea and vomiting.   Neurological: Negative for excessive daytime sleepiness, dizziness, headaches, light-headedness, loss of balance, seizures and vertigo.   Psychiatric/Behavioral: Negative for depression and substance abuse. The patient is not nervous/anxious.        Procedures  Procedures        Objective:      Vitals:    02/12/20 1249   BP: 170/80   Pulse: 52   Weight: 87.1 kg  "(192 lb)   Height: 177.8 cm (70\")     Body mass index is 27.55 kg/m².    Physical Exam   Constitutional: He is oriented to person, place, and time. He appears well-developed and well-nourished. No distress.   HENT:   Head: Normocephalic and atraumatic.   Eyes: Conjunctivae are normal. No scleral icterus.   Neck: Neck supple. No JVD present. Carotid bruit is not present. No thyromegaly present.   Cardiovascular: Normal rate, regular rhythm, S1 normal, S2 normal and intact distal pulses.  No extrasystoles are present. PMI is not displaced. Exam reveals no gallop.   Murmur heard.   Midsystolic murmur is present with a grade of 2/6 at the upper right sternal border and upper left sternal border.  Pulses:       Carotid pulses are 2+ on the right side, and 2+ on the left side.       Radial pulses are 2+ on the right side, and 2+ on the left side.        Dorsalis pedis pulses are 2+ on the right side, and 2+ on the left side.        Posterior tibial pulses are 2+ on the right side, and 2+ on the left side.   Pulmonary/Chest: Effort normal and breath sounds normal. No respiratory distress. He has no wheezes. He has no rhonchi. He has no rales. He exhibits no tenderness.   Abdominal: Soft. Bowel sounds are normal. He exhibits no distension, no abdominal bruit and no mass. There is no tenderness.   Musculoskeletal: He exhibits no edema or deformity.   Lymphadenopathy:     He has no cervical adenopathy.   Neurological: He is alert and oriented to person, place, and time. No cranial nerve deficit.   Skin: Skin is warm and dry. No rash noted. He is not diaphoretic. No cyanosis. No pallor. Nails show no clubbing.   Psychiatric: He has a normal mood and affect. Judgment normal.   Vitals reviewed.      Lab Review:       Assessment:      Diagnosis Plan   1. Essential hypertension  Duplex Renal Artery - Bilateral Complete CAR    Metanephrines, Urine, 24 Hour - Urine, Clean Catch    Catecholamine+VMA, 24-Hr Urine - Urine, Clean Catch "    Thyroid Panel With TSH    Adult Transthoracic Echo Complete W/ Cont if Necessary Per Protocol   2. Mixed hyperlipidemia  Duplex Renal Artery - Bilateral Complete CAR    Metanephrines, Urine, 24 Hour - Urine, Clean Catch    Catecholamine+VMA, 24-Hr Urine - Urine, Clean Catch    Thyroid Panel With TSH   3. Heart murmur  Adult Transthoracic Echo Complete W/ Cont if Necessary Per Protocol     1. Non-cardiac chest pain. Normal stress nuclear perfusion study done January 2013.  2. Hypertension, uncontrolled. Look for secondary causes  3. History of renal cyst, stable.  4. Hyperlipidemia in the form of low HDL.   5. 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   6. Stage 1 prostate cancer 2/2015, s/p radiation with Dr. Burns.  7. Murmur, set up echo.      Plan:       See gely in 6 weeks.  Try doxazosin 2mg nightly.  Set up testing for secondary for hypertension.

## 2020-02-14 ENCOUNTER — LAB (OUTPATIENT)
Dept: LAB | Facility: HOSPITAL | Age: 80
End: 2020-02-14

## 2020-02-14 DIAGNOSIS — E78.2 MIXED HYPERLIPIDEMIA: ICD-10-CM

## 2020-02-14 DIAGNOSIS — I10 ESSENTIAL HYPERTENSION: ICD-10-CM

## 2020-02-14 LAB
T-UPTAKE NFR SERPL: 0.94 TBI (ref 0.8–1.3)
T4 SERPL-MCNC: 6.04 MCG/DL (ref 4.5–11.7)
TSH SERPL DL<=0.05 MIU/L-ACNC: 4.51 UIU/ML (ref 0.27–4.2)

## 2020-02-14 PROCEDURE — 81050 URINALYSIS VOLUME MEASURE: CPT

## 2020-02-14 PROCEDURE — 83835 ASSAY OF METANEPHRINES: CPT

## 2020-02-14 PROCEDURE — 84443 ASSAY THYROID STIM HORMONE: CPT

## 2020-02-14 PROCEDURE — 82384 ASSAY THREE CATECHOLAMINES: CPT

## 2020-02-14 PROCEDURE — 84479 ASSAY OF THYROID (T3 OR T4): CPT

## 2020-02-14 PROCEDURE — 84585 ASSAY OF URINE VMA: CPT

## 2020-02-14 PROCEDURE — 36415 COLL VENOUS BLD VENIPUNCTURE: CPT

## 2020-02-14 PROCEDURE — 84436 ASSAY OF TOTAL THYROXINE: CPT

## 2020-02-18 LAB
METANEPHS 24H UR-MRATE: 137 UG/24 HR (ref 45–290)
METANEPHS UR-MCNC: 39 UG/L
NORMETANEPHRINE 24H UR-MCNC: 62 UG/L
NORMETANEPHRINE 24H UR-MRATE: 217 UG/24 HR (ref 82–500)

## 2020-02-20 LAB
DOPAMINE 24H UR-MRATE: 753 UG/24 HR (ref 0–510)
DOPAMINE UR-MCNC: 215 UG/L
EPINEPH 24H UR-MRATE: 4 UG/24 HR (ref 0–20)
EPINEPH UR-MCNC: 1 UG/L
NOREPINEPH 24H UR-MRATE: 60 UG/24 HR (ref 0–135)
NOREPINEPH UR-MCNC: 17 UG/L
VMA 24H UR-MRATE: 3.9 MG/24 HR (ref 0–7.5)
VMA UR-MCNC: 1.1 MG/L

## 2020-02-27 ENCOUNTER — TELEPHONE (OUTPATIENT)
Dept: CARDIOLOGY | Facility: CLINIC | Age: 80
End: 2020-02-27

## 2020-02-27 ENCOUNTER — HOSPITAL ENCOUNTER (OUTPATIENT)
Dept: CARDIOLOGY | Facility: HOSPITAL | Age: 80
Discharge: HOME | End: 2020-02-27

## 2020-02-27 ENCOUNTER — HOSPITAL ENCOUNTER (OUTPATIENT)
Dept: CARDIOLOGY | Facility: HOSPITAL | Age: 80
Discharge: HOME | End: 2020-02-27
Admitting: INTERNAL MEDICINE

## 2020-02-27 VITALS
SYSTOLIC BLOOD PRESSURE: 132 MMHG | WEIGHT: 192 LBS | HEIGHT: 70 IN | HEART RATE: 90 BPM | OXYGEN SATURATION: 96 % | BODY MASS INDEX: 27.49 KG/M2 | DIASTOLIC BLOOD PRESSURE: 60 MMHG

## 2020-02-27 DIAGNOSIS — I10 ESSENTIAL HYPERTENSION: ICD-10-CM

## 2020-02-27 DIAGNOSIS — R01.1 HEART MURMUR: ICD-10-CM

## 2020-02-27 DIAGNOSIS — E78.2 MIXED HYPERLIPIDEMIA: ICD-10-CM

## 2020-02-27 LAB
AORTIC ROOT ANNULUS: 2 CM
ASCENDING AORTA: 2.9 CM
BH CV ECHO MEAS - ACS: 1.9 CM
BH CV ECHO MEAS - AO MAX PG (FULL): 4.1 MMHG
BH CV ECHO MEAS - AO MAX PG: 10.1 MMHG
BH CV ECHO MEAS - AO MEAN PG (FULL): 1.5 MMHG
BH CV ECHO MEAS - AO MEAN PG: 5.2 MMHG
BH CV ECHO MEAS - AO ROOT AREA (BSA CORRECTED): 1.5
BH CV ECHO MEAS - AO ROOT AREA: 7.6 CM^2
BH CV ECHO MEAS - AO ROOT DIAM: 3.1 CM
BH CV ECHO MEAS - AO V2 MAX: 158.8 CM/SEC
BH CV ECHO MEAS - AO V2 MEAN: 104.8 CM/SEC
BH CV ECHO MEAS - AO V2 VTI: 36.3 CM
BH CV ECHO MEAS - AVA(I,A): 2.8 CM^2
BH CV ECHO MEAS - AVA(I,D): 2.8 CM^2
BH CV ECHO MEAS - AVA(V,A): 2.6 CM^2
BH CV ECHO MEAS - AVA(V,D): 2.6 CM^2
BH CV ECHO MEAS - BSA(HAYCOCK): 2.1 M^2
BH CV ECHO MEAS - BSA: 2.1 M^2
BH CV ECHO MEAS - BZI_BMI: 27.5 KILOGRAMS/M^2
BH CV ECHO MEAS - BZI_METRIC_HEIGHT: 177.8 CM
BH CV ECHO MEAS - BZI_METRIC_WEIGHT: 87.1 KG
BH CV ECHO MEAS - DIST REN A EDV LEFT: -28.6 CM/SEC
BH CV ECHO MEAS - DIST REN A PSV LEFT: -126.3 CM/SEC
BH CV ECHO MEAS - DIST REN A RI LEFT: 0.77
BH CV ECHO MEAS - EDV(MOD-SP2): 123 ML
BH CV ECHO MEAS - EDV(MOD-SP4): 135 ML
BH CV ECHO MEAS - EDV(TEICH): 142.7 ML
BH CV ECHO MEAS - EF(CUBED): 63.9 %
BH CV ECHO MEAS - EF(MOD-BP): 61 %
BH CV ECHO MEAS - EF(MOD-SP2): 60.2 %
BH CV ECHO MEAS - EF(MOD-SP4): 62.2 %
BH CV ECHO MEAS - EF(TEICH): 54.9 %
BH CV ECHO MEAS - ESV(MOD-SP2): 49 ML
BH CV ECHO MEAS - ESV(MOD-SP4): 51 ML
BH CV ECHO MEAS - ESV(TEICH): 64.3 ML
BH CV ECHO MEAS - FS: 28.8 %
BH CV ECHO MEAS - IVS/LVPW: 1
BH CV ECHO MEAS - IVSD: 1.2 CM
BH CV ECHO MEAS - LAT PEAK E' VEL: 12 CM/SEC
BH CV ECHO MEAS - LV DIASTOLIC VOL/BSA (35-75): 65.8 ML/M^2
BH CV ECHO MEAS - LV MASS(C)D: 251.9 GRAMS
BH CV ECHO MEAS - LV MASS(C)DI: 122.8 GRAMS/M^2
BH CV ECHO MEAS - LV MAX PG: 6 MMHG
BH CV ECHO MEAS - LV MEAN PG: 3.7 MMHG
BH CV ECHO MEAS - LV SYSTOLIC VOL/BSA (12-30): 24.9 ML/M^2
BH CV ECHO MEAS - LV V1 MAX: 122 CM/SEC
BH CV ECHO MEAS - LV V1 MEAN: 91.8 CM/SEC
BH CV ECHO MEAS - LV V1 VTI: 30.6 CM
BH CV ECHO MEAS - LVIDD: 5.4 CM
BH CV ECHO MEAS - LVIDS: 3.9 CM
BH CV ECHO MEAS - LVLD AP2: 7 CM
BH CV ECHO MEAS - LVLD AP4: 7.2 CM
BH CV ECHO MEAS - LVLS AP2: 5.6 CM
BH CV ECHO MEAS - LVLS AP4: 5.8 CM
BH CV ECHO MEAS - LVOT AREA (M): 3.5 CM^2
BH CV ECHO MEAS - LVOT AREA: 3.3 CM^2
BH CV ECHO MEAS - LVOT DIAM: 2.1 CM
BH CV ECHO MEAS - LVPWD: 1.2 CM
BH CV ECHO MEAS - MED PEAK E' VEL: 6 CM/SEC
BH CV ECHO MEAS - MID REN A EDV LEFT: -20.9 CM/SEC
BH CV ECHO MEAS - MID REN A PSV LEFT: -119.7 CM/SEC
BH CV ECHO MEAS - MID REN A RI LEFT: 0.83
BH CV ECHO MEAS - MV A DUR: 0.11 SEC
BH CV ECHO MEAS - MV A MAX VEL: 94.1 CM/SEC
BH CV ECHO MEAS - MV DEC SLOPE: 330.1 CM/SEC^2
BH CV ECHO MEAS - MV DEC TIME: 0.26 SEC
BH CV ECHO MEAS - MV E MAX VEL: 79.3 CM/SEC
BH CV ECHO MEAS - MV E/A: 0.84
BH CV ECHO MEAS - MV MAX PG: 4 MMHG
BH CV ECHO MEAS - MV MEAN PG: 1.5 MMHG
BH CV ECHO MEAS - MV P1/2T MAX VEL: 97.7 CM/SEC
BH CV ECHO MEAS - MV P1/2T: 86.7 MSEC
BH CV ECHO MEAS - MV V2 MAX: 100.4 CM/SEC
BH CV ECHO MEAS - MV V2 MEAN: 55.3 CM/SEC
BH CV ECHO MEAS - MV V2 VTI: 41.2 CM
BH CV ECHO MEAS - MVA P1/2T LCG: 2.3 CM^2
BH CV ECHO MEAS - MVA(P1/2T): 2.5 CM^2
BH CV ECHO MEAS - MVA(VTI): 2.5 CM^2
BH CV ECHO MEAS - PA MAX PG (FULL): 3.7 MMHG
BH CV ECHO MEAS - PA MAX PG: 6.1 MMHG
BH CV ECHO MEAS - PA V2 MAX: 123.5 CM/SEC
BH CV ECHO MEAS - PROX REN A EDV LEFT: -27.5 CM/SEC
BH CV ECHO MEAS - PROX REN A PSV LEFT: -128.5 CM/SEC
BH CV ECHO MEAS - PROX REN A RI LEFT: 0.79
BH CV ECHO MEAS - PULM A REVS DUR: 0.11 SEC
BH CV ECHO MEAS - PULM A REVS VEL: 28.2 CM/SEC
BH CV ECHO MEAS - PULM DIAS VEL: 47 CM/SEC
BH CV ECHO MEAS - PULM S/D: 1.7
BH CV ECHO MEAS - PULM SYS VEL: 79.6 CM/SEC
BH CV ECHO MEAS - PVA(V,A): 2.5 CM^2
BH CV ECHO MEAS - PVA(V,D): 2.5 CM^2
BH CV ECHO MEAS - QP/QS: 0.71
BH CV ECHO MEAS - RAP SYSTOLE: 3 MMHG
BH CV ECHO MEAS - RV MAX PG: 2.4 MMHG
BH CV ECHO MEAS - RV MEAN PG: 1.3 MMHG
BH CV ECHO MEAS - RV V1 MAX: 77.1 CM/SEC
BH CV ECHO MEAS - RV V1 MEAN: 52.7 CM/SEC
BH CV ECHO MEAS - RV V1 VTI: 18.1 CM
BH CV ECHO MEAS - RVOT AREA: 4 CM^2
BH CV ECHO MEAS - RVOT DIAM: 2.3 CM
BH CV ECHO MEAS - RVSP: 25 MMHG
BH CV ECHO MEAS - SI(AO): 134.1 ML/M^2
BH CV ECHO MEAS - SI(CUBED): 49.7 ML/M^2
BH CV ECHO MEAS - SI(LVOT): 49.8 ML/M^2
BH CV ECHO MEAS - SI(MOD-SP2): 36.1 ML/M^2
BH CV ECHO MEAS - SI(MOD-SP4): 40.9 ML/M^2
BH CV ECHO MEAS - SI(TEICH): 38.2 ML/M^2
BH CV ECHO MEAS - SUP REN AO DIAM: 1.7 CM
BH CV ECHO MEAS - SV(AO): 275.1 ML
BH CV ECHO MEAS - SV(CUBED): 101.9 ML
BH CV ECHO MEAS - SV(LVOT): 102.1 ML
BH CV ECHO MEAS - SV(MOD-SP2): 74 ML
BH CV ECHO MEAS - SV(MOD-SP4): 84 ML
BH CV ECHO MEAS - SV(RVOT): 73 ML
BH CV ECHO MEAS - SV(TEICH): 78.4 ML
BH CV ECHO MEAS - TAPSE (>1.6): 2.2 CM2
BH CV ECHO MEAS - TR MAX VEL: 234.8 CM/SEC
BH CV ECHO MEASUREMENTS AVERAGE E/E' RATIO: 8.81
BH CV VAS KIDNEY HEIGHT LEFT: 5.4 CM
BH CV VAS RENAL AORTIC MID PSV: 100 CM/S
BH CV XLRA - TDI S': 19 CM/SEC
BH CV XLRA MEAS - KID L LEFT: 8.6 CM
BH CV XLRA MEAS DIST REN A EDV RIGHT: -27.2 CM/SEC
BH CV XLRA MEAS DIST REN A PSV RIGHT: -137.2 CM/SEC
BH CV XLRA MEAS DIST REN A RI RIGHT: 0.8
BH CV XLRA MEAS KID H RIGHT: 4.4 CM
BH CV XLRA MEAS KID L RIGHT: 8.4 CM
BH CV XLRA MEAS MID REN A EDV RIGHT: -34 CM/SEC
BH CV XLRA MEAS MID REN A PSV RIGHT: -171.1 CM/SEC
BH CV XLRA MEAS MID REN A RI RIGHT: 0.8
BH CV XLRA MEAS PROX REN A EDV RIGHT: 25.3 CM/SEC
BH CV XLRA MEAS PROX REN A PSV RIGHT: 129.6 CM/SEC
BH CV XLRA MEAS PROX REN A RI RIGHT: 0.81
BH CV XLRA MEAS RAR LEFT: 1.29
BH CV XLRA MEAS RAR RIGHT: 1.71
LEFT ATRIUM VOLUME INDEX: 34 ML/M2
LEFT ATRIUM VOLUME: 71 CM3
LV EF 2D ECHO EST: 61 %
MAXIMAL PREDICTED HEART RATE: 141 BPM
SINUS: 3 CM
STJ: 2.8 CM
STRESS TARGET HR: 120 BPM

## 2020-02-27 PROCEDURE — 93306 TTE W/DOPPLER COMPLETE: CPT

## 2020-02-27 PROCEDURE — 25010000002 PERFLUTREN (DEFINITY) 8.476 MG IN SODIUM CHLORIDE 0.9 % 10 ML INJECTION: Performed by: INTERNAL MEDICINE

## 2020-02-27 PROCEDURE — 93975 VASCULAR STUDY: CPT | Performed by: INTERNAL MEDICINE

## 2020-02-27 PROCEDURE — 93975 VASCULAR STUDY: CPT

## 2020-02-27 PROCEDURE — 93306 TTE W/DOPPLER COMPLETE: CPT | Performed by: INTERNAL MEDICINE

## 2020-02-27 RX ORDER — NEBIVOLOL 10 MG/1
5 TABLET ORAL 2 TIMES DAILY
Qty: 30 TABLET | Refills: 6 | Status: SHIPPED | OUTPATIENT
Start: 2020-02-27 | End: 2020-04-23

## 2020-02-27 RX ADMIN — PERFLUTREN 1.5 ML: 6.52 INJECTION, SUSPENSION INTRAVENOUS at 08:53

## 2020-02-27 NOTE — TELEPHONE ENCOUNTER
Pt came in the office today and states that his bystolic is expensive. His insurance gave him an option either metoprolol or carvidelol. Pt is intolerant of carvedilol.      BP Reading updates since increasing his bystolic 5 mg BID and adding doxazosin 2mg PM.      2/19/22020  133/64  2/20/2020     128/60  2/21/2020     149/63  2/22/2020     125/59  2/23/2020     153/68  2/24/2020     132/67  2/25/2020     128/62  2/26/2020      142/63      Current med lis  Bysotolic 5 mg BID  Doxazosin 2 mg PM  Spironolactone 25 mg QD  Lisinopril 40 mg QD  Lasix 20 mg QD  Norvasc  10 mg AM    Pt had his echo and duplex renal artery done today. I gave him some samples of Bystolic 5 MG      Thanks  Lena ORTEZ

## 2020-03-24 ENCOUNTER — TELEPHONE (OUTPATIENT)
Dept: CARDIOLOGY | Facility: CLINIC | Age: 80
End: 2020-03-24

## 2020-03-24 NOTE — TELEPHONE ENCOUNTER
Dr. Cabezas,     Patient called in today with complaints of fatigue and tiredness.  You increased his bystolic to 5mg BID on 2/12/2020.  His symptoms began approximately a week ago when he noted his BP was down consistently in the 110s over 50s. And his Heartrate has steadily decreased to be below 50 for this last week as well.      Cardiac related meds:    Bystolic 5mg BID,    Doxazosin 2mg daily  Spironolactone 25mg daily  Lisinopril 40mg daily  amlidipine 10mg daily  Lasix 20mg daily    I reviewed your most recent office note, he was supposed to have a 6 week visit which would by at 3/25/2020.  There is no visit scheduled.  Does he need a telephone visit or to be seen in clinic.    Anna Rick RN  Triage [facility]

## 2020-03-24 NOTE — TELEPHONE ENCOUNTER
New dosage information given to wife and she will pass it on to patient.    Anna Rick RN  Triage [facility]

## 2020-04-06 ENCOUNTER — TELEPHONE (OUTPATIENT)
Dept: CARDIOLOGY | Facility: CLINIC | Age: 80
End: 2020-04-06

## 2020-04-06 NOTE — TELEPHONE ENCOUNTER
"04/06/20  10:08 AM  Quincy Roberts  1940  Home Phone [phone]   Mobile [phone]     Quincy Leonard called again today. He said he decreased his Bystolic to 5 mg in the evening, but his heart rate is still low and he said he feels \"draggy\" like he doesn't have a lot of energy.    The are his B/P and HR readings over the last several days:    121/57, 51  115/57, 47  118/54, 46  129/56, 47  112/59, 49  111/56, 52    Cardiac related meds:   Bystolic 5mg pm   Doxazosin 2mg daily  Spironolactone 25mg daily  Lisinopril 40mg daily  amlidipine 10mg daily  Lasix 20mg daily       Do you have any further recommendations for him?    Thank you!    Dolly Mckinney RN  Triage [facility]    "

## 2020-04-06 NOTE — TELEPHONE ENCOUNTER
Farida,    Would you be able to address this for me?    Thank you!    Dolly Mckinney, RN  Triage [facility]

## 2020-04-06 NOTE — TELEPHONE ENCOUNTER
I reviewed patient's recent blood pressure and heart rates.  I told him his blood pressure is excellent.  He is concerned that the low heart rate is making him feel tired.    He tells me that his Bystolic was recently decreased from 10 mg to 5 mg at nighttime only.  He is still concerned that his heart rate is too low.  I have recommended taking Bystolic 2.5 mg daily.  He really wants to stop the beta-blocker altogether.    I said okay, but I was hesitant to oblige.  We have had a tough time getting his blood pressure well controlled.    I have asked him to monitor his blood pressure and heart rates at home over the next couple of weeks and call with an update.

## 2020-04-07 RX ORDER — AMLODIPINE BESYLATE 10 MG/1
TABLET ORAL
Qty: 90 TABLET | Refills: 0 | Status: SHIPPED | OUTPATIENT
Start: 2020-04-07 | End: 2020-05-29

## 2020-04-20 ENCOUNTER — TELEPHONE (OUTPATIENT)
Dept: CARDIOLOGY | Facility: CLINIC | Age: 80
End: 2020-04-20

## 2020-04-20 NOTE — TELEPHONE ENCOUNTER
Patient called office today as directed by Farida after discontinuing his Bystolic completely.  States that his average BP has been 122/60 and HR average is 60.  He had no complaints.  Stated he didn't want to read out all the BP/HR so he just averaged them.  Told him that office would call him back with any instructions if necessary, otherwise continue with not taking Bystolic.    Thanks  Lana Crandall RN  [facility] Triage Nurse

## 2020-04-23 ENCOUNTER — TELEMEDICINE - AUDIO (OUTPATIENT)
Dept: CARDIOLOGY | Facility: CLINIC | Age: 80
End: 2020-04-23

## 2020-04-23 VITALS
SYSTOLIC BLOOD PRESSURE: 122 MMHG | HEIGHT: 70 IN | BODY MASS INDEX: 26.77 KG/M2 | TEMPERATURE: 97.1 F | DIASTOLIC BLOOD PRESSURE: 62 MMHG | HEART RATE: 56 BPM | WEIGHT: 187 LBS

## 2020-04-23 DIAGNOSIS — E78.2 MIXED HYPERLIPIDEMIA: ICD-10-CM

## 2020-04-23 DIAGNOSIS — N28.1 CYST OF LEFT KIDNEY: ICD-10-CM

## 2020-04-23 DIAGNOSIS — I10 ESSENTIAL HYPERTENSION: Primary | ICD-10-CM

## 2020-04-23 DIAGNOSIS — R00.1 SINUS BRADYCARDIA: ICD-10-CM

## 2020-04-23 DIAGNOSIS — R79.89 ABNORMAL TSH: ICD-10-CM

## 2020-04-23 DIAGNOSIS — Q24.5 CORONARY-MYOCARDIAL BRIDGE: ICD-10-CM

## 2020-04-23 PROCEDURE — 99214 OFFICE O/P EST MOD 30 MIN: CPT | Performed by: NURSE PRACTITIONER

## 2020-04-23 RX ORDER — LISINOPRIL 40 MG/1
40 TABLET ORAL DAILY
Qty: 90 TABLET | Refills: 3 | Status: SHIPPED | OUTPATIENT
Start: 2020-04-23 | End: 2020-10-22

## 2020-04-23 NOTE — PROGRESS NOTES
Telehealth Visit     Date of Office Visit: 2020  Encounter Provider: JANNY Peters  Place of Service: [facility]  Patient Name: Quincy Roberts  :1940  Primary Cardiologist: Dr. Elmira Cabezas    Chief Complaint   Patient presents with   • Hypertension   • Follow-up   :     Dear Dr. Valle,     HPI: Quincy Roberts is a pleasant 79 y.o. male who is following up today in regards to his blood pressure. Due to COVID-19 virus, I am conducting a telehealth visit via video with patient and he has consented to this visit today. 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    In 2002, he had a cardiac catheterization which showed myocardial bridging, but no significant stenosis.  In May 2013, he had a nuclear stress test which showed no evidence of ischemia.  In 2015, he was diagnosed with prostate cancer and underwent radiation therapy.  He has been diagnosed with hypertension and was unable to tolerate carvedilol in the past.      In 2019, he said he was dragging and his blood pressure was low at 108/54.  He had been taking doxazosin for urination and blood pressure and it was discontinued.    In 2019, he was hospitalized for left knee swelling and found to have a large left knee effusion.  He was treated with IV vancomycin and underwent left knee arthrocentesis with 110 mL bloody synovial fluid that was removed.  I reviewed his blood work from that hospitalization which included a CBC which showed hemoglobin of 12.1 and hematocrit 36.8.  CMP normal, except for elevated glucose of 176.    Since 2019, we have been battling elevated blood pressure. He was tried on HCTZ but developed itching so it was discontinued.  His PCP started him on furosemide.  #2019, his lisinopril was increased to 40 mg daily follow-up BMP showed normal kidney function and potassium.    In 2020, he followed up with me  "and his blood pressure was still elevated and he wanted to go back on the doxazosin for better blood pressure control and he was experiencing nocturia.    In February 2020, he saw Dr. Elmira Cabezas in the office his blood pressure was running 130-160s over 70s.  She recommended doxazosin 2 mg at nighttime and Bystolic. TSH abnormal 4.5 and he followed up with his PCP. He will have this rechecked in the future at his PCP office.  Metanephrines and catecholamines    In March 2020, he called our office with complaints of fatigue on Bystolic 5 mg twice per day.  His blood pressure at that time was 110/50s and heart rate 50.  Dr. Cabezas decrease the Bystolic to 5 mg at nighttime.    On 4/6/2020 he called our office stating that he was feeling \"draggy\" and had decreased energy on the Bystolic 5 mg daily.  Blood pressure was ranging 111//56 with heart rates 46-52.  I spoke with him and decrease the Bystolic to 2.5 mg daily for better heart rate control.  He stopped the beta-blocker altogether.    On 4/20/2020 he contacted our office stating that he was no longer taking the beta-blocker and his average blood pressure is 122/60 and heart rate was 60.  He had no further complaints.    Today were conducting a telephone visit.  He said his blood pressure today was 109/68 and he feels much better.  He is no longer taking the Bystolic and his energy level has improved.  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  He denies shortness of breath with his daily activities.  He further denies chest pain, PND, orthopnea, edema, palpitations, dizziness, syncope, or bleeding.  He tells me that he was told he has a cyst on his left kidney and is due for repeat CT scan in the future.  He wanted to know if he should restart his aspirin 81 mg daily and I said yes.    Past Medical History:   Diagnosis Date   • Anemia    • Diverticulosis    • GERD (gastroesophageal reflux disease)    • Health care " maintenance    • History of colon polyps    • History of jaundice     Childhood   • History of prostate cancer 2014   • History of radiation therapy    • Hyperlipidemia    • Hypertension    • Kidney stones    • Osteoarthritis    • Prostate cancer (CMS/HCC) 2014   • Pyogenic arthritis of left knee joint (CMS/HCC)        Past Surgical History:   Procedure Laterality Date   • COLONOSCOPY N/A 8/10/2018    Procedure: COLONOSCOPY INTO CECUM AND TERMINAL ILEUM;  Surgeon: Valentino Stern MD;  Location: [facility] ENDOSCOPY;  Service: Gastroenterology   • ENDOSCOPY N/A 8/2/2017    Procedure: ESOPHAGOGASTRODUODENOSCOPY WITH COLD BIOPSIES;  Surgeon: Valentino PEÑA MD;  Location: [facility] ENDOSCOPY;  Service:    • HERNIA REPAIR  2010   • ROTATOR CUFF REPAIR Left 2003   • TOTAL HIP ARTHROPLASTY Right 2011       Social History     Socioeconomic History   • Marital status:      Spouse name: Kassidy   • Number of children: Not on file   • Years of education: High School   • Highest education level: Not on file   Occupational History   • Occupation:      Employer: SELF-EMPLOYED   Tobacco Use   • Smoking status: Never Smoker   • Smokeless tobacco: Never Used   Substance and Sexual Activity   • Alcohol use: No     Comment: caffeine use: 2-3 cups daily   • Drug use: No   • Sexual activity: Defer       Family History   Problem Relation Age of Onset   • Breast cancer Mother 70   • Dementia Mother    • Malig Hyperthermia Neg Hx        The following portion of the patient's history were reviewed and updated as appropriate: past medical history, past surgical history, past social history, past family history, allergies, current medications, and problem list.    Review of Systems   Constitution: Negative for chills, diaphoresis, fever, malaise/fatigue, night sweats, weight gain and weight loss.   HENT: Negative for hearing loss, nosebleeds, sore throat and tinnitus.    Eyes: Negative for blurred vision, double vision,  pain and visual disturbance.   Cardiovascular: Positive for dyspnea on exertion. Negative for chest pain, claudication, cyanosis, irregular heartbeat, leg swelling, near-syncope, orthopnea, palpitations, paroxysmal nocturnal dyspnea and syncope.   Respiratory: Negative for cough, hemoptysis, shortness of breath, snoring and wheezing.    Endocrine: Negative for cold intolerance, heat intolerance and polyuria.   Hematologic/Lymphatic: Negative for bleeding problem. Does not bruise/bleed easily.   Skin: Negative for color change, dry skin, flushing and itching.   Musculoskeletal: Negative for falls, joint pain, joint swelling, muscle cramps, muscle weakness and myalgias.   Gastrointestinal: Negative for abdominal pain, constipation, heartburn, melena, nausea and vomiting.   Genitourinary: Negative for dysuria, frequency and hematuria.   Neurological: Negative for excessive daytime sleepiness, dizziness, light-headedness, loss of balance, numbness, paresthesias, seizures and vertigo.   Psychiatric/Behavioral: Negative for altered mental status, depression, memory loss and substance abuse. The patient does not have insomnia and is not nervous/anxious.    Allergic/Immunologic: Negative for environmental allergies.       Allergies   Allergen Reactions   • Metoclopramide Anaphylaxis   • Carvedilol Unknown - Low Severity     Intolerant   • Hctz [Hydrochlorothiazide] Itching   • Bystolic [Nebivolol Hcl] Other (See Comments)     Bradycardia, fatigue, decreased energy          Current Outpatient Medications:   •  Acetaminophen (TYLENOL PO), Take 1 tablet by mouth As Needed., Disp: , Rfl:   •  amLODIPine (NORVASC) 7.5 MG tablet, Every Morning., Disp: 90 tablet, Rfl: 1  •  Cholecalciferol (VITAMIN D3 PO), Take 1,000 mg by mouth Daily., Disp: , Rfl:   •  coenzyme Q10 50 MG capsule capsule, Take  by mouth Daily., Disp: , Rfl:   •  Cyanocobalamin (B-12 PO), Take 750 mcg by mouth., Disp: , Rfl:   •  esomeprazole (NEXIUM) 40 MG  "capsule, Take 40 mg by mouth Every Morning Before Breakfast., Disp: , Rfl:   •  fluticasone (FLONASE) 50 MCG/ACT nasal spray, 2 sprays into the nostril(s) as directed by provider Daily., Disp: , Rfl:   •  FOLIC ACID PO, Take 100 mcg by mouth., Disp: , Rfl:   •  Lactobacillus (PROBIOTIC ACIDOPHILUS PO), Take 1 tablet by mouth Daily., Disp: , Rfl:   •  Omega-3 Fatty Acids (FISH OIL) 1000 MG capsule capsule, Take 1,000 mg by mouth Daily With Breakfast., Disp: , Rfl:   •  Pyridoxine HCl (VITAMIN B6 PO), Take 30 mg by mouth., Disp: , Rfl:   •  TURMERIC PO, Take 250 mg by mouth., Disp: , Rfl:         Objective:     Vitals:    04/23/20 1349   BP: 122/62   BP Location: Left arm   Pulse: 56   Temp: 97.1 °F (36.2 °C)   Weight: 84.8 kg (187 lb)   Height: 177.8 cm (70\")     Body mass index is 26.83 kg/m².    Due to telehealth visit, there was no EKG, vitals, or weight performed in our office.  Vitals/Weight were reported by the patient and conducted at home.        Assessment:       Diagnosis Plan   1. Essential hypertension     2. Sinus bradycardia     3. Coronary-myocardial bridge     4. Mixed hyperlipidemia     5. Cyst of left kidney     6. Abnormal TSH            Plan:       1.  Hypertension: Since restarting the doxazosin his blood pressure has been much better controlled.  He was tried on Bystolic, but developed fatigue and decreased energy.  Bystolic was discontinued and he feels better.  We will continue with his current dose of medications.    2.  Sinus Bradycardia: Heart rates have been stable.    3.  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  Denies anginal symptoms.  I have recommended that he restart his aspirin 81 mg daily.    4.  Hyperlipidemia: Recent cholesterol panel looked good.    5.  Left Kidney Cyst: He is due for repeat CT scan arranged by his PCP in the future.    6.  Abnormal TSH: He will have a repeat TSH at his PCP office in the future.    7.  I have recommended follow-up in 6 " months with me in the office, unless otherwise needed sooner.     This patient has consented to a telehealth visit via telephone. The visit was scheduled as a telephone visit to comply with patient safety concerns in accordance with CDC recommendations.  All vitals recorded within this visit are reported by the patient.  I spent 25 minutes in total including but not limited to the 9 minutes spent in direct conversation with this patient.     As always, it has been a pleasure to participate in your patient's care. Thank you.       Sincerely,         JANNY Baldwin        Dictated utilizing Dragon dictation

## 2020-05-07 ENCOUNTER — TELEPHONE (OUTPATIENT)
Dept: CARDIOLOGY | Facility: CLINIC | Age: 80
End: 2020-05-07

## 2020-05-07 NOTE — TELEPHONE ENCOUNTER
He can stop the furosemide and then call with an update in a couple of weeks.  Continue with lisinopril 20 mg daily and the rest of the medications.

## 2020-05-07 NOTE — TELEPHONE ENCOUNTER
Farida,    Mr. Roberts called us today with complaints of feeling lightheaded and dizzy when standing from a bent position and generally feeling tired and bad. When questioned he also says he is SOA expecially with exertion.  He denies any other complaints or symptoms.    He is wondering if he is now on too many diuretics. He has been trying to increase his fluid intake the last week since he had a televisit with you.  His BP is running as low as 101-108/50s this last week with one instance of 98/55.  He feels like this is too low for him.    His meds have been changed several times recently.  To clarify, since your visit he is  taking:    Lisinopril 20mg daily (he decreased this himself from 40mg with no change in BP)  Norvasc 10mg daily  Lasix 25mg daily  Spironolactone 20 mg daily  Doxazosine 2mg daily      Do you have any recommendations?      Anna Rick RN  Triage [facility]

## 2020-05-07 NOTE — TELEPHONE ENCOUNTER
I spoke with Mr. Roberts and gave him your instructions.  He verbalized understanding and denied any further questions.    Anna Rick RN  Triage [facility]

## 2020-05-28 NOTE — TELEPHONE ENCOUNTER
Pt called to give an update on his blood pressure  Averaging 120/59 heart rate 64    Highest 131/63 lowest 109/58

## 2020-05-29 RX ORDER — AMLODIPINE BESYLATE 10 MG/1
TABLET ORAL
Qty: 90 TABLET | Refills: 0 | Status: SHIPPED | OUTPATIENT
Start: 2020-05-29 | End: 2020-09-11

## 2020-05-29 NOTE — TELEPHONE ENCOUNTER
Blood pressure and heart rate looks good.  I hope his dizziness has improved with stopping the furosemide.

## 2020-05-29 NOTE — TELEPHONE ENCOUNTER
Spoke with pt wife she states that pt is doing much better with no dizziness since stopping the Furosemide

## 2020-06-29 ENCOUNTER — TELEPHONE (OUTPATIENT)
Dept: CARDIOLOGY | Facility: CLINIC | Age: 80
End: 2020-06-29

## 2020-06-29 DIAGNOSIS — I10 ESSENTIAL HYPERTENSION: Primary | ICD-10-CM

## 2020-06-29 NOTE — TELEPHONE ENCOUNTER
Stop spironolactone because of breast tenderness. Follow up with PCP about rash. Monitor BPs and call with an update in one week please. Thanks.

## 2020-06-29 NOTE — TELEPHONE ENCOUNTER
Pt called and states that since he started taking spironolactone 25 QD, his been c/o sore and tender on his both breast and rash on his both legs. His BP reading averaging from  126/60 something.      Current med list  Norvasc 10 mg QD  cardura  2 mg QD  Lisinopril 40 mg ( currently not taking it)      THANKS  Lena ORTEZ

## 2020-06-30 ENCOUNTER — HOSPITAL ENCOUNTER (OUTPATIENT)
Dept: OTHER | Facility: HOSPITAL | Age: 80
Discharge: HOME | End: 2020-06-30
Attending: FAMILY MEDICINE

## 2020-06-30 LAB — TSH SERPL-ACNC: 6.71 M[IU]/L (ref 0.27–4.2)

## 2020-07-01 LAB — T4 FREE SERPL-MCNC: 1 NG/DL (ref 0.9–1.8)

## 2020-07-06 ENCOUNTER — OFFICE VISIT CONVERTED (OUTPATIENT)
Dept: FAMILY MEDICINE CLINIC | Age: 80
End: 2020-07-06
Attending: FAMILY MEDICINE

## 2020-07-16 RX ORDER — FUROSEMIDE 20 MG/1
20 TABLET ORAL DAILY
Qty: 30 TABLET | Refills: 1
Start: 2020-07-16 | End: 2020-10-22

## 2020-07-27 ENCOUNTER — HOSPITAL ENCOUNTER (OUTPATIENT)
Dept: OTHER | Facility: HOSPITAL | Age: 80
Discharge: HOME | End: 2020-07-27
Attending: NURSE PRACTITIONER

## 2020-07-28 LAB
ANION GAP SERPL CALC-SCNC: 16 MMOL/L (ref 8–19)
BUN SERPL-MCNC: 15 MG/DL (ref 5–25)
BUN/CREAT SERPL: 10 {RATIO} (ref 6–20)
CALCIUM SERPL-MCNC: 9.6 MG/DL (ref 8.7–10.4)
CHLORIDE SERPL-SCNC: 105 MMOL/L (ref 99–111)
CONV CO2: 24 MMOL/L (ref 22–32)
CREAT UR-MCNC: 1.43 MG/DL (ref 0.7–1.2)
GFR SERPLBLD BASED ON 1.73 SQ M-ARVRAT: 46 ML/MIN/{1.73_M2}
GLUCOSE SERPL-MCNC: 94 MG/DL (ref 70–99)
OSMOLALITY SERPL CALC.SUM OF ELEC: 293 MOSM/KG (ref 273–304)
POTASSIUM SERPL-SCNC: 4.1 MMOL/L (ref 3.5–5.3)
SODIUM SERPL-SCNC: 141 MMOL/L (ref 135–147)

## 2020-08-10 ENCOUNTER — OFFICE VISIT (OUTPATIENT)
Dept: ORTHOPEDIC SURGERY | Facility: CLINIC | Age: 80
End: 2020-08-10

## 2020-08-10 VITALS — BODY MASS INDEX: 26.92 KG/M2 | TEMPERATURE: 98 F | HEIGHT: 70 IN | WEIGHT: 188 LBS

## 2020-08-10 DIAGNOSIS — M25.462 EFFUSION OF LEFT KNEE: ICD-10-CM

## 2020-08-10 DIAGNOSIS — M25.562 LEFT KNEE PAIN, UNSPECIFIED CHRONICITY: Primary | ICD-10-CM

## 2020-08-10 DIAGNOSIS — M17.12 PRIMARY OSTEOARTHRITIS OF LEFT KNEE: ICD-10-CM

## 2020-08-10 PROCEDURE — 73562 X-RAY EXAM OF KNEE 3: CPT | Performed by: NURSE PRACTITIONER

## 2020-08-10 PROCEDURE — 99213 OFFICE O/P EST LOW 20 MIN: CPT | Performed by: NURSE PRACTITIONER

## 2020-08-10 RX ORDER — LEVOTHYROXINE SODIUM 0.05 MG/1
100 TABLET ORAL DAILY
COMMUNITY
End: 2021-09-13 | Stop reason: ALTCHOICE

## 2020-08-10 NOTE — PROGRESS NOTES
Patient Name: Quincy Roberts   YOB: 1940  Referring Primary Care Physician: Jose Valle MD  BMI: Body mass index is 26.98 kg/m².    Chief Complaint:    Chief Complaint   Patient presents with   • Left Knee - Pain        HPI: New patient to the practice presents for left knee pain he has prior has seen Dr. Bhat in the past and had his knee aspirated and injected and is seeing Zachary Barnard many years ago.  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  Denies any instability locking or catching.  Patient is very active and wants to stay that way.  Mask were warned by myself and the patient throughout the duration of the appointment denies any fever injury or trauma.    Quincy Roberts is a 79 y.o. male who presents today for evaluation of   Chief Complaint   Patient presents with   • Left Knee - Pain       This problem is new to this examiner.     Subjective   Medications:   Home Medications:  Current Outpatient Medications on File Prior to Visit   Medication Sig   • Acetaminophen (TYLENOL PO) Take 1 tablet by mouth As Needed.   • amLODIPine (NORVASC) 10 MG tablet TAKE 1 TABLET EVERY MORNING   • Cholecalciferol (VITAMIN D3 PO) Take 1,000 mg by mouth Daily.   • coenzyme Q10 50 MG capsule capsule Take  by mouth Daily.   • doxazosin (CARDURA) 2 MG tablet Take 1 tablet by mouth Every Night.   • esomeprazole (NEXIUM) 40 MG capsule Take 40 mg by mouth Every Morning Before Breakfast.   • fluticasone (FLONASE) 50 MCG/ACT nasal spray 2 sprays into the nostril(s) as directed by provider Daily.   • furosemide (LASIX) 20 MG tablet Take 1 tablet by mouth Daily.   • Lactobacillus (PROBIOTIC ACIDOPHILUS PO) Take 1 tablet by mouth Daily.   • levothyroxine (SYNTHROID, LEVOTHROID) 25 MCG tablet Take 25 mcg by mouth Daily.   • Omega-3 Fatty Acids (FISH OIL) 1000 MG capsule capsule Take 1,000 mg by mouth Daily  With Breakfast. Omega Q plus   • aspirin 81 MG tablet Take 1 tablet by mouth Daily.   • lisinopril (PRINIVIL,ZESTRIL) 40 MG tablet Take 1 tablet by mouth Daily.     No current facility-administered medications on file prior to visit.      Current Medications:  Scheduled Meds:  Continuous Infusions:  No current facility-administered medications for this visit.   PRN Meds:.    I have reviewed the patient's medical history in detail and updated the computerized patient record.  Review and summarization of old records includes:    Past Medical History:   Diagnosis Date   • Anemia    • Diverticulosis    • GERD (gastroesophageal reflux disease)    • Health care maintenance    • History of colon polyps    • History of jaundice     Childhood   • History of prostate cancer 2014   • History of radiation therapy    • Hyperlipidemia    • Hypertension    • Kidney stones    • Osteoarthritis    • Prostate cancer (CMS/HCC) 2014   • Pyogenic arthritis of left knee joint (CMS/HCC)         Past Surgical History:   Procedure Laterality Date   • COLONOSCOPY N/A 8/10/2018    Procedure: COLONOSCOPY INTO CECUM AND TERMINAL ILEUM;  Surgeon: Valentino Stern MD;  Location: [facility] ENDOSCOPY;  Service: Gastroenterology   • ENDOSCOPY N/A 8/2/2017    Procedure: ESOPHAGOGASTRODUODENOSCOPY WITH COLD BIOPSIES;  Surgeon: Valentino PEÑA MD;  Location: [facility] ENDOSCOPY;  Service:    • HERNIA REPAIR  2010   • ROTATOR CUFF REPAIR Left 2003   • TOTAL HIP ARTHROPLASTY Right 2011        Social History     Occupational History   • Occupation:      Employer: SELF-EMPLOYED   Tobacco Use   • Smoking status: Never Smoker   • Smokeless tobacco: Never Used   Substance and Sexual Activity   • Alcohol use: No     Comment: caffeine use: 2-3 cups daily   • Drug use: No   • Sexual activity: Defer      Social History     Social History Narrative   • Not on file        Family History   Problem Relation Age of Onset   • Breast cancer Mother 70   •  "Dementia Mother    • Malig Hyperthermia Neg Hx        ROS: 14 point review of systems was performed and all other systems were reviewed and are negative except for documented findings in HPI and today's encounter.     Allergies:   Allergies   Allergen Reactions   • Metoclopramide Anaphylaxis   • Carvedilol Unknown - Low Severity     Intolerant   • Hctz [Hydrochlorothiazide] Itching   • Bystolic [Nebivolol Hcl] Other (See Comments)     Bradycardia, fatigue, decreased energy    • Spironolactone Rash     Rash and breast tenderness     Constitutional:  Denies fever, shaking or chills   Eyes:  Denies change in visual acuity   HENT:  Denies nasal congestion or sore throat   Respiratory:  Denies cough or shortness of breath   Cardiovascular:  Denies chest pain or severe LE edema   GI:  Denies abdominal pain, nausea, vomiting, bloody stools or diarrhea   Musculoskeletal:  Numbness, tingling, pain, or loss of motor function only as noted above in history of present illness.  : Denies painful urination or hematuria  Integument:  Denies rash, lesion or ulceration   Neurologic:  Denies headache or focal weakness  Endocrine:  Denies lymphadenopathy  Psych:  Denies confusion or change in mental status   Hem:  Denies active bleeding    OBJECTIVE:  Physical Exam: 79 y.o. male  Wt Readings from Last 3 Encounters:   08/10/20 85.3 kg (188 lb)   04/23/20 84.8 kg (187 lb)   02/27/20 87.1 kg (192 lb)     Ht Readings from Last 1 Encounters:   08/10/20 177.8 cm (70\")     Body mass index is 26.98 kg/m².  Vitals:    08/10/20 1045   Temp: 98 °F (36.7 °C)     Vital signs reviewed.     General Appearance:    Alert, cooperative, in no acute distress                  Eyes: conjunctiva clear  ENT: external ears and nose atraumatic  CV: no peripheral edema  Resp: normal respiratory effort  Skin: no rashes or wounds; normal turgor  Psych: mood and affect appropriate  Lymph: no nodes appreciated  Neuro: gross sensation intact  Vascular:  Palpable " peripheral pulse in noted extremity  Musculoskeletal Extremities: Left knee with a large effusion skin is warm dry intact there is no sign symptoms of infection calves are soft and nontender medial joint line tenderness with patellofemoral crepitation good range of motion straight leg raise is positive ambulates with a without any assistive devices.    Radiology:   Left knee 3 views were done for pain no readily available views for comparison reveal tricompartmental osteoarthritis    Assessment:     ICD-10-CM ICD-9-CM   1. Left knee pain, unspecified chronicity M25.562 719.46   2. Effusion of left knee M25.462 719.06   3. Primary osteoarthritis of left knee M17.12 715.16        Large Joint Arthrocentesis: L knee  Date/Time: 8/12/2020 1:08 PM  Consent given by: patient  Site marked: site marked  Timeout: Immediately prior to procedure a time out was called to verify the correct patient, procedure, equipment, support staff and site/side marked as required   Supporting Documentation  Indications: pain and joint swelling   Procedure Details  Location: knee - L knee  Preparation: Patient was prepped and draped in the usual sterile fashion  Needle gauge: 21 G.  Approach: anterolateral  Medications administered: 2 mL lidocaine (cardiac); 80 mg methylPREDNISolone acetate 80 MG/ML  Aspirate amount: 60 mL  Aspirate: serous  Patient tolerance: patient tolerated the procedure well with no immediate complications             Plan: Biomechanics of pertinent body area discussed.  Risks, benefits, alternatives, comparisons, and complications of accepted medicines, injections, recommendations, surgical procedures, and therapies explained and education provided in laymen's terms. Natural history and expected course of this patient's diagnosis discussed along with evaluation of therapies. Questions answered. When appropriate I also discussed proper use of cane, walker, trekking poles.   BMI:  The concept of BMI body mass index and its  importance and implications discussed.  BMI suggested to be < 40 or as low as possible. Lifestyle measures for weight loss and how this affects orthopedic condition.  EXERCISES:  Advice on benefits of, and types of regular/moderate exercise including biomechanical forces involved as it pertains to this complaint.  MEDICATIONS:  Prescription, OTC and Monitoring of Medications per orders to address ortho complaints; Evaluation and discussion of safety, precautions, side effects, and warnings given especially of long term NSAID or steroid therapy.    RICE: Rest, ice, compression, and elevation therapy, Cryotherapy/brachy therapy, and or OTC linaments as indicated with instructions.   Cortisone Injection. See procedure note.      8/12/2020    Much of this encounter note is an electronic transcription/translation of spoken language to printed text. The electronic translation of spoken language may permit erroneous, or at times, nonsensical words or phrases to be inadvertently transcribed; Although I have reviewed the note for such errors, some may still exist

## 2020-08-12 PROCEDURE — 20610 DRAIN/INJ JOINT/BURSA W/O US: CPT | Performed by: NURSE PRACTITIONER

## 2020-08-12 RX ORDER — METHYLPREDNISOLONE ACETATE 80 MG/ML
80 INJECTION, SUSPENSION INTRA-ARTICULAR; INTRALESIONAL; INTRAMUSCULAR; SOFT TISSUE
Status: COMPLETED | OUTPATIENT
Start: 2020-08-12 | End: 2020-08-12

## 2020-08-12 RX ADMIN — METHYLPREDNISOLONE ACETATE 80 MG: 80 INJECTION, SUSPENSION INTRA-ARTICULAR; INTRALESIONAL; INTRAMUSCULAR; SOFT TISSUE at 13:08

## 2020-09-11 ENCOUNTER — HOSPITAL ENCOUNTER (OUTPATIENT)
Dept: OTHER | Facility: HOSPITAL | Age: 80
Discharge: HOME | End: 2020-09-11
Attending: FAMILY MEDICINE

## 2020-09-11 ENCOUNTER — OFFICE VISIT CONVERTED (OUTPATIENT)
Dept: FAMILY MEDICINE CLINIC | Age: 80
End: 2020-09-11
Attending: FAMILY MEDICINE

## 2020-09-11 LAB
ALBUMIN SERPL-MCNC: 4.3 G/DL (ref 3.5–5)
ALBUMIN/GLOB SERPL: 1.5 {RATIO} (ref 1.4–2.6)
ALP SERPL-CCNC: 69 U/L (ref 56–155)
ALT SERPL-CCNC: 27 U/L (ref 10–40)
ANION GAP SERPL CALC-SCNC: 21 MMOL/L (ref 8–19)
AST SERPL-CCNC: 27 U/L (ref 15–50)
BILIRUB SERPL-MCNC: 0.82 MG/DL (ref 0.2–1.3)
BUN SERPL-MCNC: 14 MG/DL (ref 5–25)
BUN/CREAT SERPL: 12 {RATIO} (ref 6–20)
CALCIUM SERPL-MCNC: 9.3 MG/DL (ref 8.7–10.4)
CHLORIDE SERPL-SCNC: 103 MMOL/L (ref 99–111)
CHOLEST SERPL-MCNC: 139 MG/DL (ref 107–200)
CHOLEST/HDLC SERPL: 3.4 {RATIO} (ref 3–6)
CONV CO2: 21 MMOL/L (ref 22–32)
CONV TOTAL PROTEIN: 7.1 G/DL (ref 6.3–8.2)
CREAT UR-MCNC: 1.19 MG/DL (ref 0.7–1.2)
GFR SERPLBLD BASED ON 1.73 SQ M-ARVRAT: 57 ML/MIN/{1.73_M2}
GLOBULIN UR ELPH-MCNC: 2.8 G/DL (ref 2–3.5)
GLUCOSE SERPL-MCNC: 123 MG/DL (ref 70–99)
HDLC SERPL-MCNC: 41 MG/DL (ref 40–60)
LDLC SERPL CALC-MCNC: 87 MG/DL (ref 70–100)
OSMOLALITY SERPL CALC.SUM OF ELEC: 294 MOSM/KG (ref 273–304)
POTASSIUM SERPL-SCNC: 3.9 MMOL/L (ref 3.5–5.3)
SODIUM SERPL-SCNC: 141 MMOL/L (ref 135–147)
TRIGL SERPL-MCNC: 56 MG/DL (ref 40–150)
TSH SERPL-ACNC: 3.17 M[IU]/L (ref 0.27–4.2)
VLDLC SERPL-MCNC: 11 MG/DL (ref 5–37)

## 2020-09-11 RX ORDER — AMLODIPINE BESYLATE 10 MG/1
TABLET ORAL
Qty: 90 TABLET | Refills: 0 | Status: SHIPPED | OUTPATIENT
Start: 2020-09-11 | End: 2020-11-10

## 2020-09-14 LAB
EST. AVERAGE GLUCOSE BLD GHB EST-MCNC: 88 MG/DL
HBA1C MFR BLD: 4.7 % (ref 3.5–5.7)

## 2020-10-08 ENCOUNTER — TELEPHONE (OUTPATIENT)
Dept: CARDIOLOGY | Facility: CLINIC | Age: 80
End: 2020-10-08

## 2020-10-08 NOTE — TELEPHONE ENCOUNTER
Notified patient of Dr. Cabezas's orders and recommendations. He verbalized understanding.    Dolly Mckinney RN  Triage [facility]

## 2020-10-08 NOTE — TELEPHONE ENCOUNTER
Patient called stating he would like to speak with you about his Lasix. No other information was given.        Thank you,   Delmy Waters LPN

## 2020-10-08 NOTE — TELEPHONE ENCOUNTER
Called and spoke with pt's wife. Pt had an episode a month ago when he felt dizzy, like he was going to pass out. B/P was 146/76, . He went inside, rested and drank water. Then, he felt fine.    Yesterday, a similar episode happened. 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 He went inside. B/P was 129/94, . He rested and drank more water. Then, his B/P was 138/72, HR 63.    His wife said she was worried that the furosemide was contributing to these episodes. They weren't sure how to prevent an episode like that from happening again.    Dolly Mckinney, RN  [place]riage [facility]

## 2020-10-21 ENCOUNTER — TELEPHONE (OUTPATIENT)
Dept: CARDIOLOGY | Facility: CLINIC | Age: 80
End: 2020-10-21

## 2020-10-21 NOTE — TELEPHONE ENCOUNTER
Pt is calling in to let you know that his heart rate today has been 108-156.    Was 156 during our phone call and he stated it feels irregular.  I have instructed him to go to the ER for further evaluation  Thanks  Zeina Johnson RN  Triage nurse

## 2020-10-22 ENCOUNTER — OFFICE VISIT (OUTPATIENT)
Dept: CARDIOLOGY | Facility: CLINIC | Age: 80
End: 2020-10-22

## 2020-10-22 VITALS
SYSTOLIC BLOOD PRESSURE: 138 MMHG | HEART RATE: 59 BPM | BODY MASS INDEX: 27.49 KG/M2 | HEIGHT: 70 IN | WEIGHT: 192 LBS | DIASTOLIC BLOOD PRESSURE: 72 MMHG

## 2020-10-22 VITALS
BODY MASS INDEX: 27.2 KG/M2 | DIASTOLIC BLOOD PRESSURE: 70 MMHG | HEIGHT: 70 IN | HEART RATE: 59 BPM | SYSTOLIC BLOOD PRESSURE: 132 MMHG | WEIGHT: 190 LBS

## 2020-10-22 DIAGNOSIS — I48.0 AF (PAROXYSMAL ATRIAL FIBRILLATION) (HCC): Primary | ICD-10-CM

## 2020-10-22 DIAGNOSIS — E78.2 MIXED HYPERLIPIDEMIA: ICD-10-CM

## 2020-10-22 DIAGNOSIS — I10 ESSENTIAL HYPERTENSION: ICD-10-CM

## 2020-10-22 DIAGNOSIS — I48.0 PAF (PAROXYSMAL ATRIAL FIBRILLATION) (HCC): Primary | ICD-10-CM

## 2020-10-22 PROCEDURE — 99204 OFFICE O/P NEW MOD 45 MIN: CPT | Performed by: INTERNAL MEDICINE

## 2020-10-22 PROCEDURE — 93000 ELECTROCARDIOGRAM COMPLETE: CPT | Performed by: INTERNAL MEDICINE

## 2020-10-22 PROCEDURE — 99214 OFFICE O/P EST MOD 30 MIN: CPT | Performed by: INTERNAL MEDICINE

## 2020-10-22 NOTE — PROGRESS NOTES
Date of Office Visit: 10/22/2020  Encounter Provider: Elmira Cabezas MD  Place of Service: [facility] CARDIOLOGY  Patient Name: Quincy Roberts  :1940      Patient ID:  Quincy Roberts is a 80 y.o. male is here for  followup for PAF.         History of Present Illness    I first saw in the chest pain unit at  [facility], [place].  He presented there with exertional chest pain, short-windedness,  and fatigue.  He went to the emergency department and his blood pressure was very high at  about 200/120.  He had not had that issue in quite some time.  Because of his chest pain,  we did an ischemic workup.  He ruled out for a myocardial infarction and had a stress  nuclear perfusion study done on January 10, 2013, which showed no ischemia.  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  During his hospitalization, he also had a lipid panel done on  January 10, 2013, which showed triglycerides of 59, HDL of 31, LDL of 82, and total  cholesterol of 125.     He did have some left upper quadrant pain and for  that he saw Dr. Stern.  He subsequently had an EGD.  He also had a CT of his abdomen  and pelvis.  The EGD looked fine.  The CT of the abdomen and pelvis suggested diverticular  disease.  He then had a colonoscopy performed and 7 polyps were removed, and I think they  have been treating him for the diverticular disease. His last EGD was 2017 and was normal.         He was diagnosed with prostate cancer in 2015. He sees Dr. Garcia  at [facility]. He underwent radiation therapy for that and it has helped, not only the  prostatic hypertrophy, but the prostate cancer.      In 2019, he said he was dragging and his blood pressure was low at 108/54.  He had been taking doxazosin for urination and blood pressure and it was discontinued.     In 2019, he was hospitalized for left knee  swelling and found to have a large left knee effusion.  He was treated with IV vancomycin and underwent left knee arthrocentesis with 110 mL bloody synovial fluid that was removed.     He was unable to tolerate the hydrochlorothiazide due to itching so it was discontinued.       On 12/3/2019, he came to our office for a blood pressure check and manually it was 154/70 and 168/72.  His blood pressure machine ran much higher at 183/73.  I recommended that he follow a low-sodium diet and increased his lisinopril to 40 mg daily.  He had a follow-up BMP on 1/3/2020 which showed normal kidney function and potassium.    Echo done 2/27/2020 showed ejection fraction 61% with borderline dilated left atrium, normal valvular structure and function.  Laboratory values in 2/14/2020 show slightly elevated TSH with normal total T4 and normal T uptake.  He had telemedicine visit with Farida Chiu 4/6/2020 and at that time he was complaining of decreased energy on Bystolic.  His heart rate is running 46-42.  He then stopped the Bystolic and his blood pressure was well controlled.  His heart rate was stable.  He felt like his energy level improved as well.    Labs done 9/11/2020 show normal TSH, HDL 41, LDL 87, triglycerides 56, normal CMP.    He had an ECG done at [facility] because he felt his heart racing and he was in atrial fibrillation with rapid ventricular response at 150 bpm.  He has had 3 episodes of atrial fibrillation with rapid ventricular response since 9/10/2020 with the other occurrences on 10/7/2020 and 10/21/2020.  After he got metoprolol IV at the [facility] emergency department, when he got home, his heart rate was in the 40s and his blood pressure dropped down to 70 and he felt near syncopal.  This is happened to him on other occasions when he is gotten beta-blockers.  He feels his heart racing when this happens.  He does feel lightheaded and unwell.  In between times, he feels good.  He is notices  blood pressures been high at times so he went back on doxazosin at 4 mg daily and has felt better and his blood pressures been better, measuring 130/70 at home.  His heart rate today is in the 50s.    Labs done 10/21/2020 at [facility] show normal CMP, magnesium 1.8, normal TSH, alcohol normal, normal CBC.  Troponin and proBNP were also both normal.  UA was normal.  Chest x-ray showed no acute process.    Past Medical History:   Diagnosis Date   • Anemia    • Diverticulosis    • GERD (gastroesophageal reflux disease)    • Health care maintenance    • History of colon polyps    • History of jaundice     Childhood   • History of prostate cancer 2014   • History of radiation therapy    • Hyperlipidemia    • Hypertension    • Kidney stones    • Osteoarthritis    • Prostate cancer (CMS/HCC) 2014   • Pyogenic arthritis of left knee joint (CMS/HCC)          Past Surgical History:   Procedure Laterality Date   • COLONOSCOPY N/A 8/10/2018    Procedure: COLONOSCOPY INTO CECUM AND TERMINAL ILEUM;  Surgeon: Valentino Stern MD;  Location: [facility] ENDOSCOPY;  Service: Gastroenterology   • ENDOSCOPY N/A 8/2/2017    Procedure: ESOPHAGOGASTRODUODENOSCOPY WITH COLD BIOPSIES;  Surgeon: Valentino PEÑA MD;  Location: [facility] ENDOSCOPY;  Service:    • HERNIA REPAIR  2010   • ROTATOR CUFF REPAIR Left 2003   • TOTAL HIP ARTHROPLASTY Right 2011       Current Outpatient Medications on File Prior to Visit   Medication Sig Dispense Refill   • Acetaminophen (TYLENOL PO) Take 1 tablet by mouth As Needed.     • amLODIPine (NORVASC) 10 MG tablet TAKE 1 TABLET EVERY MORNING 90 tablet 0   • aspirin 81 MG tablet Take 1 tablet by mouth Daily. 30 tablet 0   • Cholecalciferol (VITAMIN D3 PO) Take 1,000 mg by mouth Daily.     • coenzyme Q10 50 MG capsule capsule Take 50 mg by mouth Daily.     • doxazosin (CARDURA) 2 MG tablet Take 1 tablet by mouth Every Night. (Patient taking differently: Take 4 mg by mouth Every Night.) 90 tablet 3   •  esomeprazole (NEXIUM) 40 MG capsule Take 40 mg by mouth Every Morning Before Breakfast.     • fluticasone (FLONASE) 50 MCG/ACT nasal spray 2 sprays into the nostril(s) as directed by provider Daily.     • Lactobacillus (PROBIOTIC ACIDOPHILUS PO) Take 1 tablet by mouth Daily.     • levothyroxine (SYNTHROID, LEVOTHROID) 25 MCG tablet Take 25 mcg by mouth Daily.     • Omega-3 Fatty Acids (FISH OIL) 1000 MG capsule capsule Take 1,000 mg by mouth Daily With Breakfast. Omega Q plus     • [DISCONTINUED] furosemide (LASIX) 20 MG tablet Take 1 tablet by mouth Daily. 30 tablet 1   • [DISCONTINUED] lisinopril (PRINIVIL,ZESTRIL) 40 MG tablet Take 1 tablet by mouth Daily. 90 tablet 3     No current facility-administered medications on file prior to visit.        Social History     Socioeconomic History   • Marital status:      Spouse name: Kassidy   • Number of children: Not on file   • Years of education: High School   • Highest education level: Not on file   Occupational History   • Occupation:      Employer: SELF-EMPLOYED   Tobacco Use   • Smoking status: Never Smoker   • Smokeless tobacco: Never Used   Substance and Sexual Activity   • Alcohol use: No     Comment: caffeine use: 2-3 cups daily   • Drug use: No   • Sexual activity: Defer           Review of Systems   Constitution: Negative.   HENT: Negative for congestion.    Eyes: Negative for vision loss in left eye and vision loss in right eye.   Respiratory: Negative.  Negative for cough, hemoptysis, shortness of breath, sleep disturbances due to breathing, snoring, sputum production and wheezing.    Endocrine: Negative.    Hematologic/Lymphatic: Negative.    Skin: Negative for poor wound healing and rash.   Musculoskeletal: Negative for falls, gout, muscle cramps and myalgias.   Gastrointestinal: Negative for abdominal pain, diarrhea, dysphagia, hematemesis, melena, nausea and vomiting.   Neurological: Negative for excessive daytime sleepiness, dizziness,  "headaches, light-headedness, loss of balance, seizures and vertigo.   Psychiatric/Behavioral: Negative for depression and substance abuse. The patient is not nervous/anxious.        Procedures    ECG 12 Lead    Date/Time: 10/22/2020 11:27 AM  Performed by: Elmira Cabezas MD  Authorized by: Elmira Cabezas MD   Comparison: compared with previous ECG   Similar to previous ECG  Rhythm: sinus rhythm    Clinical impression: normal ECG                Objective:      Vitals:    10/22/20 1109   BP: 138/72   Pulse: 59   Weight: 87.1 kg (192 lb)   Height: 177.8 cm (70\")     Body mass index is 27.55 kg/m².    Vitals signs reviewed.   Constitutional:       General: Not in acute distress.     Appearance: Well-developed. Not diaphoretic.   Eyes:      General: No scleral icterus.     Conjunctiva/sclera: Conjunctivae normal.   HENT:      Head: Normocephalic and atraumatic.   Neck:      Musculoskeletal: Neck supple.      Thyroid: No thyromegaly.      Vascular: No carotid bruit or JVD.      Lymphadenopathy: No cervical adenopathy.   Pulmonary:      Effort: Pulmonary effort is normal. No respiratory distress.      Breath sounds: Normal breath sounds. No wheezing. No rhonchi. No rales.   Chest:      Chest wall: Not tender to palpatation.   Cardiovascular:      Normal rate. Regular rhythm.      No gallop.   Pulses:     Intact distal pulses.   Edema:     Peripheral edema absent.   Abdominal:      General: Bowel sounds are normal. There is no distension or abdominal bruit.      Palpations: Abdomen is soft. There is no abdominal mass.      Tenderness: There is no abdominal tenderness.   Musculoskeletal:         General: No deformity.      Extremities: No clubbing present.  Skin:     General: Skin is warm and dry. There is no cyanosis.      Coloration: Skin is not pale.      Findings: No rash.   Neurological:      Mental Status: Alert and oriented to person, place, and time.      Cranial Nerves: No cranial nerve deficit. "   Psychiatric:         Judgment: Judgment normal.         Lab Review:       Assessment:      Diagnosis Plan   1. PAF (paroxysmal atrial fibrillation) (CMS/HCC)     2. Essential hypertension     3. Mixed hyperlipidemia       1. Non-cardiac chest pain. Normal stress nuclear perfusion study done January 2013.  2. Hypertension, uncontrolled. Look for secondary causes  3. History of renal cyst, stable.  4. Hyperlipidemia in the form of low HDL.   5. 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   6. Stage 1 prostate cancer 2/2015, s/p radiation with Dr. Burns.  7. PAF, with RVR.  3 episodes since 9/10/2020.  Start apixaban 5 mg twice daily.  Cannot tolerate AV rodo blockers because he always becomes very bradycardic with fatigue and dizziness and even some near syncope.  Will refer to see EP today.     Plan:       Apixaban 5 mg twice daily, referral to EP, see Farida in 6 months, no changes to blood pressure medications.

## 2020-10-22 NOTE — PROGRESS NOTES
Date of Office Visit: 10/22/2020  Encounter Provider: Brandyn Olivier MD  Place of Service: [facility] CARDIOLOGY  Patient Name: Quincy Roberts  :1940    Chief Complaint   Patient presents with   • Atrial Fibrillation     New Patient Consult / RM ref   :     HPI: Quincy Roberts is a 80 y.o. male who presents today for evaluation and treatment of paroxysmal atrial fibrillation.  Patient was first diagnosed with atrial fibrillation 1 month ago, after transient tachycardia and palpitations with associated dizziness at home.  He has had 3 or 4 similar episodes since, 2 of which resulted in hospital admissions.  He has had a lot of difficulty tolerating beta-blockers in the past.  After his most recent episode of atrial fibrillation he presented to the emergency room and got a dose of IV metoprolol.  He went home and had profound hypotension, felt like he was going to pass out.  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  He does not drink alcohol.  He does not have a history of sleep apnea.          Past Medical History:   Diagnosis Date   • Anemia    • Diverticulosis    • GERD (gastroesophageal reflux disease)    • Health care maintenance    • History of colon polyps    • History of jaundice     Childhood   • History of prostate cancer    • History of radiation therapy    • Hyperlipidemia    • Hypertension    • Kidney stones    • Osteoarthritis    • Prostate cancer (CMS/HCC)    • Pyogenic arthritis of left knee joint (CMS/HCC)        Past Surgical History:   Procedure Laterality Date   • COLONOSCOPY N/A 8/10/2018    Procedure: COLONOSCOPY INTO CECUM AND TERMINAL ILEUM;  Surgeon: Valentino Stern MD;  Location: [facility] ENDOSCOPY;  Service: Gastroenterology   • ENDOSCOPY N/A 2017    Procedure: ESOPHAGOGASTRODUODENOSCOPY WITH COLD BIOPSIES;  Surgeon: Valentino PEÑA MD;  Location:   [facility] ENDOSCOPY;  Service:    • HERNIA REPAIR  2010   • ROTATOR CUFF REPAIR Left 2003   • TOTAL HIP ARTHROPLASTY Right 2011       Social History     Socioeconomic History   • Marital status:      Spouse name: Kassidy   • Number of children: Not on file   • Years of education: High School   • Highest education level: Not on file   Occupational History   • Occupation:      Employer: SELF-EMPLOYED   Tobacco Use   • Smoking status: Never Smoker   • Smokeless tobacco: Never Used   Substance and Sexual Activity   • Alcohol use: No     Comment: caffeine use: 2-3 cups daily   • Drug use: No   • Sexual activity: Defer       Family History   Problem Relation Age of Onset   • Breast cancer Mother 70   • Dementia Mother    • Malig Hyperthermia Neg Hx        Review of Systems   Constitution: Negative for malaise/fatigue.   HENT: Negative.    Eyes: Negative.    Cardiovascular: Positive for palpitations. Negative for chest pain, dyspnea on exertion, leg swelling and near-syncope.   Respiratory: Negative for cough and shortness of breath.    Endocrine: Negative.    Hematologic/Lymphatic: Negative.    Skin: Negative.    Musculoskeletal: Negative.    Gastrointestinal: Negative.    Genitourinary: Negative.    Neurological: Positive for dizziness. Negative for weakness.   Psychiatric/Behavioral: Negative.    Allergic/Immunologic: Negative.        Allergies   Allergen Reactions   • Metoclopramide Anaphylaxis   • Carvedilol Unknown - Low Severity     Intolerant   • Hctz [Hydrochlorothiazide] Itching   • Bystolic [Nebivolol Hcl] Other (See Comments)     Bradycardia, fatigue, decreased energy    • Spironolactone Rash     Rash and breast tenderness         Current Outpatient Medications:   •  Acetaminophen (TYLENOL PO), Take 1 tablet by mouth As Needed., Disp: , Rfl:   •  amLODIPine (NORVASC) 10 MG tablet, TAKE 1 TABLET EVERY MORNING, Disp: 90 tablet, Rfl: 0  •  apixaban (ELIQUIS) 5 MG tablet tablet, Take 1 tablet by mouth  "Every 12 (Twelve) Hours., Disp: 180 tablet, Rfl: 3  •  Cholecalciferol (VITAMIN D3 PO), Take 1,000 mg by mouth Daily., Disp: , Rfl:   •  coenzyme Q10 50 MG capsule capsule, Take 50 mg by mouth Daily., Disp: , Rfl:   •  doxazosin (CARDURA) 2 MG tablet, Take 1 tablet by mouth Every Night. (Patient taking differently: Take 4 mg by mouth Every Night.), Disp: 90 tablet, Rfl: 3  •  esomeprazole (NEXIUM) 40 MG capsule, Take 40 mg by mouth Every Morning Before Breakfast., Disp: , Rfl:   •  fluticasone (FLONASE) 50 MCG/ACT nasal spray, 2 sprays into the nostril(s) as directed by provider Daily., Disp: , Rfl:   •  Lactobacillus (PROBIOTIC ACIDOPHILUS PO), Take 1 tablet by mouth Daily., Disp: , Rfl:   •  levothyroxine (SYNTHROID, LEVOTHROID) 25 MCG tablet, Take 25 mcg by mouth Daily., Disp: , Rfl:   •  Omega-3 Fatty Acids (FISH OIL) 1000 MG capsule capsule, Take 1,000 mg by mouth Daily With Breakfast. Omega Q plus, Disp: , Rfl:       Objective:     Vitals:    10/22/20 1415   BP: 132/70   BP Location: Right arm   Patient Position: Sitting   Cuff Size: Adult   Pulse: 59   Weight: 86.2 kg (190 lb)   Height: 177.8 cm (70\")     Body mass index is 27.26 kg/m².    PHYSICAL EXAM:    Vitals signs and nursing note reviewed.   Constitutional:       Appearance: Healthy appearance.   HENT:    Mouth/Throat:      Pharynx: Oropharynx is clear.   Pulmonary:      Effort: Pulmonary effort is normal.   Cardiovascular:      PMI at left midclavicular line. Regular rhythm.      Murmurs: There is no murmur.   Skin:     General: Skin is warm and dry.   Neurological:      General: No focal deficit present.      Mental Status: Alert and oriented to person, place and time.           Procedures      Assessment:       Diagnosis Plan   1. AF (paroxysmal atrial fibrillation) (CMS/HCC)  Case Request EP Lab: Ablation atrial fibrillation    Holter Monitor - 72 Hour Up To 21 Days          Plan:       Patient with paroxysmal atrial fibrillation.  He has had a lot " of trouble with bradycardia with AV rodo agents.  We discussed options for management of his atrial fibrillation.  I have a lot of concerned that he is not going to tolerate antiarrhythmic medication, and we have the additional complication of not using an AV rodo blocker with it.  I think the best option is to proceed with ablation.  I discussed the performance of the ablation, the risk and benefits.  After discussion he was agreeable with proceeding.  He just started on anticoagulation today, so will need to wait about a month before we do the ablation.  In the interim I will have her wear a Holter monitor, to better understand his burden of atrial fibrillation.    As always, it has been a pleasure to participate in your patient's care.      Sincerely,         Brandyn Olivier MD

## 2020-10-23 PROBLEM — I48.0 AF (PAROXYSMAL ATRIAL FIBRILLATION) (HCC): Status: ACTIVE | Noted: 2020-10-23

## 2020-10-27 ENCOUNTER — TRANSCRIBE ORDERS (OUTPATIENT)
Dept: CARDIOLOGY | Facility: CLINIC | Age: 80
End: 2020-10-27

## 2020-10-27 ENCOUNTER — TRANSCRIBE ORDERS (OUTPATIENT)
Dept: SLEEP MEDICINE | Facility: HOSPITAL | Age: 80
End: 2020-10-27

## 2020-10-27 DIAGNOSIS — Z01.818 OTHER SPECIFIED PRE-OPERATIVE EXAMINATION: Primary | ICD-10-CM

## 2020-10-27 DIAGNOSIS — Z13.6 SCREENING FOR CARDIOVASCULAR CONDITION: Primary | ICD-10-CM

## 2020-10-27 DIAGNOSIS — Z01.810 PREPROCEDURAL CARDIOVASCULAR EXAMINATION: ICD-10-CM

## 2020-11-10 ENCOUNTER — TELEPHONE (OUTPATIENT)
Dept: CARDIOLOGY | Facility: CLINIC | Age: 80
End: 2020-11-10

## 2020-11-10 RX ORDER — AMLODIPINE BESYLATE 10 MG/1
TABLET ORAL
Qty: 90 TABLET | Refills: 3 | Status: SHIPPED | OUTPATIENT
Start: 2020-11-10 | End: 2021-02-23 | Stop reason: SDUPTHER

## 2020-11-10 NOTE — TELEPHONE ENCOUNTER
Pt called wanting to know his monitor results. I explain to patient that we do not have the results just in yet.     He wanted to tell  that he had a episode as soon as he took the monitor off his HR went up to 130, he stated that it stayed that high for a few hours.     He can be reached at [phone]    Thanks

## 2020-11-17 RX ORDER — DOXAZOSIN MESYLATE 4 MG/1
4 TABLET ORAL NIGHTLY
Qty: 90 TABLET | Refills: 1 | Status: SHIPPED | OUTPATIENT
Start: 2020-11-17 | End: 2021-02-23 | Stop reason: SDUPTHER

## 2020-11-17 RX ORDER — DOXAZOSIN 2 MG/1
TABLET ORAL
Qty: 90 TABLET | Refills: 0 | OUTPATIENT
Start: 2020-11-17

## 2020-11-20 ENCOUNTER — LAB (OUTPATIENT)
Dept: LAB | Facility: HOSPITAL | Age: 80
End: 2020-11-20

## 2020-11-20 DIAGNOSIS — Z01.818 OTHER SPECIFIED PRE-OPERATIVE EXAMINATION: ICD-10-CM

## 2020-11-20 DIAGNOSIS — Z01.810 PREPROCEDURAL CARDIOVASCULAR EXAMINATION: ICD-10-CM

## 2020-11-20 DIAGNOSIS — Z13.6 SCREENING FOR CARDIOVASCULAR CONDITION: ICD-10-CM

## 2020-11-20 LAB
ANION GAP SERPL CALCULATED.3IONS-SCNC: 10.3 MMOL/L (ref 5–15)
BASOPHILS # BLD AUTO: 0.04 10*3/MM3 (ref 0–0.2)
BASOPHILS NFR BLD AUTO: 1 % (ref 0–1.5)
BUN SERPL-MCNC: 8 MG/DL (ref 8–23)
BUN/CREAT SERPL: 8.2 (ref 7–25)
CALCIUM SPEC-SCNC: 8.9 MG/DL (ref 8.6–10.5)
CHLORIDE SERPL-SCNC: 103 MMOL/L (ref 98–107)
CO2 SERPL-SCNC: 24.7 MMOL/L (ref 22–29)
CREAT SERPL-MCNC: 0.98 MG/DL (ref 0.76–1.27)
DEPRECATED RDW RBC AUTO: 50.1 FL (ref 37–54)
EOSINOPHIL # BLD AUTO: 0.05 10*3/MM3 (ref 0–0.4)
EOSINOPHIL NFR BLD AUTO: 1.2 % (ref 0.3–6.2)
ERYTHROCYTE [DISTWIDTH] IN BLOOD BY AUTOMATED COUNT: 13.3 % (ref 12.3–15.4)
GFR SERPL CREATININE-BSD FRML MDRD: 74 ML/MIN/1.73
GLUCOSE SERPL-MCNC: 115 MG/DL (ref 65–99)
HCT VFR BLD AUTO: 35.8 % (ref 37.5–51)
HGB BLD-MCNC: 12.1 G/DL (ref 13–17.7)
IMM GRANULOCYTES # BLD AUTO: 0.02 10*3/MM3 (ref 0–0.05)
IMM GRANULOCYTES NFR BLD AUTO: 0.5 % (ref 0–0.5)
LYMPHOCYTES # BLD AUTO: 0.88 10*3/MM3 (ref 0.7–3.1)
LYMPHOCYTES NFR BLD AUTO: 21.3 % (ref 19.6–45.3)
MCH RBC QN AUTO: 34.9 PG (ref 26.6–33)
MCHC RBC AUTO-ENTMCNC: 33.8 G/DL (ref 31.5–35.7)
MCV RBC AUTO: 103.2 FL (ref 79–97)
MONOCYTES # BLD AUTO: 0.42 10*3/MM3 (ref 0.1–0.9)
MONOCYTES NFR BLD AUTO: 10.1 % (ref 5–12)
NEUTROPHILS NFR BLD AUTO: 2.73 10*3/MM3 (ref 1.7–7)
NEUTROPHILS NFR BLD AUTO: 65.9 % (ref 42.7–76)
NRBC BLD AUTO-RTO: 0 /100 WBC (ref 0–0.2)
PLATELET # BLD AUTO: 201 10*3/MM3 (ref 140–450)
PMV BLD AUTO: 9.5 FL (ref 6–12)
POTASSIUM SERPL-SCNC: 3.4 MMOL/L (ref 3.5–5.2)
RBC # BLD AUTO: 3.47 10*6/MM3 (ref 4.14–5.8)
SODIUM SERPL-SCNC: 138 MMOL/L (ref 136–145)
WBC # BLD AUTO: 4.14 10*3/MM3 (ref 3.4–10.8)

## 2020-11-20 PROCEDURE — U0004 COV-19 TEST NON-CDC HGH THRU: HCPCS

## 2020-11-20 PROCEDURE — C9803 HOPD COVID-19 SPEC COLLECT: HCPCS

## 2020-11-20 PROCEDURE — 85025 COMPLETE CBC W/AUTO DIFF WBC: CPT

## 2020-11-20 PROCEDURE — 80048 BASIC METABOLIC PNL TOTAL CA: CPT

## 2020-11-20 PROCEDURE — 36415 COLL VENOUS BLD VENIPUNCTURE: CPT

## 2020-11-21 LAB — SARS-COV-2 RNA RESP QL NAA+PROBE: NOT DETECTED

## 2020-11-23 ENCOUNTER — HOSPITAL ENCOUNTER (OUTPATIENT)
Facility: HOSPITAL | Age: 80
Discharge: HOME | End: 2020-11-24
Attending: INTERNAL MEDICINE | Admitting: INTERNAL MEDICINE

## 2020-11-23 ENCOUNTER — ANESTHESIA EVENT (OUTPATIENT)
Dept: CARDIOLOGY | Facility: HOSPITAL | Age: 80
End: 2020-11-23

## 2020-11-23 ENCOUNTER — ANESTHESIA (OUTPATIENT)
Dept: CARDIOLOGY | Facility: HOSPITAL | Age: 80
End: 2020-11-23

## 2020-11-23 DIAGNOSIS — I48.0 AF (PAROXYSMAL ATRIAL FIBRILLATION) (HCC): ICD-10-CM

## 2020-11-23 LAB
ACT BLD: 230 SECONDS (ref 82–152)
ACT BLD: 279 SECONDS (ref 82–152)
ACT BLD: 334 SECONDS (ref 82–152)
GLUCOSE BLDC GLUCOMTR-MCNC: 125 MG/DL (ref 70–130)
QT INTERVAL: 388 MS
QT INTERVAL: 442 MS

## 2020-11-23 PROCEDURE — 85347 COAGULATION TIME ACTIVATED: CPT

## 2020-11-23 PROCEDURE — C1733 CATH, EP, OTHR THAN COOL-TIP: HCPCS | Performed by: INTERNAL MEDICINE

## 2020-11-23 PROCEDURE — A9270 NON-COVERED ITEM OR SERVICE: HCPCS | Performed by: INTERNAL MEDICINE

## 2020-11-23 PROCEDURE — 25010000002 PROTAMINE SULFATE PER 10 MG: Performed by: INTERNAL MEDICINE

## 2020-11-23 PROCEDURE — C1732 CATH, EP, DIAG/ABL, 3D/VECT: HCPCS | Performed by: INTERNAL MEDICINE

## 2020-11-23 PROCEDURE — 93613 INTRACARDIAC EPHYS 3D MAPG: CPT | Performed by: INTERNAL MEDICINE

## 2020-11-23 PROCEDURE — 93656 COMPRE EP EVAL ABLTJ ATR FIB: CPT | Performed by: INTERNAL MEDICINE

## 2020-11-23 PROCEDURE — 63710000001 AMLODIPINE 10 MG TABLET: Performed by: INTERNAL MEDICINE

## 2020-11-23 PROCEDURE — C1894 INTRO/SHEATH, NON-LASER: HCPCS | Performed by: INTERNAL MEDICINE

## 2020-11-23 PROCEDURE — 25010000002 PROPOFOL 10 MG/ML EMULSION: Performed by: NURSE ANESTHETIST, CERTIFIED REGISTERED

## 2020-11-23 PROCEDURE — 25010000002 HEPARIN (PORCINE) PER 1000 UNITS: Performed by: INTERNAL MEDICINE

## 2020-11-23 PROCEDURE — C1766 INTRO/SHEATH,STRBLE,NON-PEEL: HCPCS | Performed by: INTERNAL MEDICINE

## 2020-11-23 PROCEDURE — 25010000002 ONDANSETRON PER 1 MG: Performed by: NURSE ANESTHETIST, CERTIFIED REGISTERED

## 2020-11-23 PROCEDURE — 25010000002 FENTANYL CITRATE (PF) 100 MCG/2ML SOLUTION: Performed by: NURSE ANESTHETIST, CERTIFIED REGISTERED

## 2020-11-23 PROCEDURE — 25010000002 MIDAZOLAM PER 1 MG: Performed by: ANESTHESIOLOGY

## 2020-11-23 PROCEDURE — 93005 ELECTROCARDIOGRAM TRACING: CPT | Performed by: INTERNAL MEDICINE

## 2020-11-23 PROCEDURE — 63710000001 LEVOTHYROXINE 25 MCG TABLET: Performed by: INTERNAL MEDICINE

## 2020-11-23 PROCEDURE — G0378 HOSPITAL OBSERVATION PER HR: HCPCS

## 2020-11-23 PROCEDURE — C1759 CATH, INTRA ECHOCARDIOGRAPHY: HCPCS | Performed by: INTERNAL MEDICINE

## 2020-11-23 PROCEDURE — 93662 INTRACARDIAC ECG (ICE): CPT | Performed by: INTERNAL MEDICINE

## 2020-11-23 PROCEDURE — 82962 GLUCOSE BLOOD TEST: CPT

## 2020-11-23 PROCEDURE — 63710000001 APIXABAN 5 MG TABLET: Performed by: INTERNAL MEDICINE

## 2020-11-23 PROCEDURE — 25010000002 NEOSTIGMINE PER 0.5 MG: Performed by: NURSE ANESTHETIST, CERTIFIED REGISTERED

## 2020-11-23 PROCEDURE — 0 IOPAMIDOL PER 1 ML: Performed by: INTERNAL MEDICINE

## 2020-11-23 PROCEDURE — 93010 ELECTROCARDIOGRAM REPORT: CPT | Performed by: INTERNAL MEDICINE

## 2020-11-23 PROCEDURE — 25010000002 PHENYLEPHRINE PER 1 ML: Performed by: NURSE ANESTHETIST, CERTIFIED REGISTERED

## 2020-11-23 RX ORDER — FENTANYL CITRATE 50 UG/ML
50 INJECTION, SOLUTION INTRAMUSCULAR; INTRAVENOUS
Status: DISCONTINUED | OUTPATIENT
Start: 2020-11-23 | End: 2020-11-23 | Stop reason: HOSPADM

## 2020-11-23 RX ORDER — ROCURONIUM BROMIDE 10 MG/ML
INJECTION, SOLUTION INTRAVENOUS AS NEEDED
Status: DISCONTINUED | OUTPATIENT
Start: 2020-11-23 | End: 2020-11-23 | Stop reason: SURG

## 2020-11-23 RX ORDER — OXYCODONE AND ACETAMINOPHEN 7.5; 325 MG/1; MG/1
1 TABLET ORAL ONCE AS NEEDED
Status: DISCONTINUED | OUTPATIENT
Start: 2020-11-23 | End: 2020-11-23 | Stop reason: HOSPADM

## 2020-11-23 RX ORDER — EPHEDRINE SULFATE 50 MG/ML
INJECTION, SOLUTION INTRAVENOUS AS NEEDED
Status: DISCONTINUED | OUTPATIENT
Start: 2020-11-23 | End: 2020-11-23 | Stop reason: SURG

## 2020-11-23 RX ORDER — MIDAZOLAM HYDROCHLORIDE 1 MG/ML
1 INJECTION INTRAMUSCULAR; INTRAVENOUS
Status: DISCONTINUED | OUTPATIENT
Start: 2020-11-23 | End: 2020-11-23 | Stop reason: HOSPADM

## 2020-11-23 RX ORDER — FLUMAZENIL 0.1 MG/ML
0.2 INJECTION INTRAVENOUS AS NEEDED
Status: DISCONTINUED | OUTPATIENT
Start: 2020-11-23 | End: 2020-11-23 | Stop reason: HOSPADM

## 2020-11-23 RX ORDER — SODIUM CHLORIDE 0.9 % (FLUSH) 0.9 %
3 SYRINGE (ML) INJECTION EVERY 12 HOURS SCHEDULED
Status: DISCONTINUED | OUTPATIENT
Start: 2020-11-23 | End: 2020-11-24 | Stop reason: HOSPADM

## 2020-11-23 RX ORDER — SODIUM CHLORIDE 0.9 % (FLUSH) 0.9 %
10 SYRINGE (ML) INJECTION AS NEEDED
Status: DISCONTINUED | OUTPATIENT
Start: 2020-11-23 | End: 2020-11-23 | Stop reason: HOSPADM

## 2020-11-23 RX ORDER — PROMETHAZINE HYDROCHLORIDE 12.5 MG/1
25 TABLET ORAL ONCE AS NEEDED
Status: DISCONTINUED | OUTPATIENT
Start: 2020-11-23 | End: 2020-11-23 | Stop reason: HOSPADM

## 2020-11-23 RX ORDER — SODIUM CHLORIDE 9 MG/ML
75 INJECTION, SOLUTION INTRAVENOUS CONTINUOUS
Status: DISCONTINUED | OUTPATIENT
Start: 2020-11-23 | End: 2020-11-24

## 2020-11-23 RX ORDER — NALOXONE HCL 0.4 MG/ML
0.4 VIAL (ML) INJECTION
Status: DISCONTINUED | OUTPATIENT
Start: 2020-11-23 | End: 2020-11-24 | Stop reason: HOSPADM

## 2020-11-23 RX ORDER — FENTANYL CITRATE 50 UG/ML
50 INJECTION, SOLUTION INTRAMUSCULAR; INTRAVENOUS
Status: DISCONTINUED | OUTPATIENT
Start: 2020-11-23 | End: 2020-11-24 | Stop reason: HOSPADM

## 2020-11-23 RX ORDER — PANTOPRAZOLE SODIUM 40 MG/1
40 TABLET, DELAYED RELEASE ORAL EVERY MORNING
Status: DISCONTINUED | OUTPATIENT
Start: 2020-11-23 | End: 2020-11-24 | Stop reason: HOSPADM

## 2020-11-23 RX ORDER — ACETAMINOPHEN 650 MG/1
650 SUPPOSITORY RECTAL EVERY 4 HOURS PRN
Status: DISCONTINUED | OUTPATIENT
Start: 2020-11-23 | End: 2020-11-24 | Stop reason: HOSPADM

## 2020-11-23 RX ORDER — ACETAMINOPHEN 325 MG/1
650 TABLET ORAL EVERY 4 HOURS PRN
Status: DISCONTINUED | OUTPATIENT
Start: 2020-11-23 | End: 2020-11-24 | Stop reason: HOSPADM

## 2020-11-23 RX ORDER — LIDOCAINE HYDROCHLORIDE 10 MG/ML
0.5 INJECTION, SOLUTION EPIDURAL; INFILTRATION; INTRACAUDAL; PERINEURAL ONCE AS NEEDED
Status: DISCONTINUED | OUTPATIENT
Start: 2020-11-23 | End: 2020-11-24 | Stop reason: HOSPADM

## 2020-11-23 RX ORDER — LEVOTHYROXINE SODIUM 0.03 MG/1
25 TABLET ORAL
Status: DISCONTINUED | OUTPATIENT
Start: 2020-11-23 | End: 2020-11-24 | Stop reason: HOSPADM

## 2020-11-23 RX ORDER — DIPHENHYDRAMINE HYDROCHLORIDE 50 MG/ML
12.5 INJECTION INTRAMUSCULAR; INTRAVENOUS
Status: DISCONTINUED | OUTPATIENT
Start: 2020-11-23 | End: 2020-11-23 | Stop reason: HOSPADM

## 2020-11-23 RX ORDER — HYDROMORPHONE HYDROCHLORIDE 1 MG/ML
0.5 INJECTION, SOLUTION INTRAMUSCULAR; INTRAVENOUS; SUBCUTANEOUS
Status: DISCONTINUED | OUTPATIENT
Start: 2020-11-23 | End: 2020-11-24 | Stop reason: HOSPADM

## 2020-11-23 RX ORDER — HYDROMORPHONE HYDROCHLORIDE 1 MG/ML
0.5 INJECTION, SOLUTION INTRAMUSCULAR; INTRAVENOUS; SUBCUTANEOUS
Status: DISCONTINUED | OUTPATIENT
Start: 2020-11-23 | End: 2020-11-23 | Stop reason: HOSPADM

## 2020-11-23 RX ORDER — GLYCOPYRROLATE 0.2 MG/ML
INJECTION INTRAMUSCULAR; INTRAVENOUS AS NEEDED
Status: DISCONTINUED | OUTPATIENT
Start: 2020-11-23 | End: 2020-11-23 | Stop reason: SURG

## 2020-11-23 RX ORDER — HYDROCODONE BITARTRATE AND ACETAMINOPHEN 7.5; 325 MG/1; MG/1
1 TABLET ORAL ONCE AS NEEDED
Status: DISCONTINUED | OUTPATIENT
Start: 2020-11-23 | End: 2020-11-23 | Stop reason: HOSPADM

## 2020-11-23 RX ORDER — SODIUM CHLORIDE, SODIUM LACTATE, POTASSIUM CHLORIDE, CALCIUM CHLORIDE 600; 310; 30; 20 MG/100ML; MG/100ML; MG/100ML; MG/100ML
9 INJECTION, SOLUTION INTRAVENOUS CONTINUOUS
Status: DISCONTINUED | OUTPATIENT
Start: 2020-11-23 | End: 2020-11-24

## 2020-11-23 RX ORDER — PROTAMINE SULFATE 10 MG/ML
INJECTION, SOLUTION INTRAVENOUS AS NEEDED
Status: DISCONTINUED | OUTPATIENT
Start: 2020-11-23 | End: 2020-11-23 | Stop reason: HOSPADM

## 2020-11-23 RX ORDER — SODIUM CHLORIDE 0.9 % (FLUSH) 0.9 %
3 SYRINGE (ML) INJECTION EVERY 12 HOURS SCHEDULED
Status: DISCONTINUED | OUTPATIENT
Start: 2020-11-23 | End: 2020-11-23 | Stop reason: HOSPADM

## 2020-11-23 RX ORDER — SODIUM CHLORIDE 0.9 % (FLUSH) 0.9 %
3-10 SYRINGE (ML) INJECTION AS NEEDED
Status: DISCONTINUED | OUTPATIENT
Start: 2020-11-23 | End: 2020-11-24 | Stop reason: HOSPADM

## 2020-11-23 RX ORDER — AMLODIPINE BESYLATE 10 MG/1
10 TABLET ORAL EVERY MORNING
Status: DISCONTINUED | OUTPATIENT
Start: 2020-11-23 | End: 2020-11-24 | Stop reason: HOSPADM

## 2020-11-23 RX ORDER — NALOXONE HCL 0.4 MG/ML
0.2 VIAL (ML) INJECTION AS NEEDED
Status: DISCONTINUED | OUTPATIENT
Start: 2020-11-23 | End: 2020-11-23 | Stop reason: HOSPADM

## 2020-11-23 RX ORDER — PROPOFOL 10 MG/ML
VIAL (ML) INTRAVENOUS AS NEEDED
Status: DISCONTINUED | OUTPATIENT
Start: 2020-11-23 | End: 2020-11-23 | Stop reason: SURG

## 2020-11-23 RX ORDER — HEPARIN SODIUM 10000 [USP'U]/100ML
INJECTION, SOLUTION INTRAVENOUS CONTINUOUS PRN
Status: DISCONTINUED | OUTPATIENT
Start: 2020-11-23 | End: 2020-11-23 | Stop reason: HOSPADM

## 2020-11-23 RX ORDER — ONDANSETRON 2 MG/ML
4 INJECTION INTRAMUSCULAR; INTRAVENOUS ONCE AS NEEDED
Status: DISCONTINUED | OUTPATIENT
Start: 2020-11-23 | End: 2020-11-23 | Stop reason: HOSPADM

## 2020-11-23 RX ORDER — FENTANYL CITRATE 50 UG/ML
INJECTION, SOLUTION INTRAMUSCULAR; INTRAVENOUS AS NEEDED
Status: DISCONTINUED | OUTPATIENT
Start: 2020-11-23 | End: 2020-11-23 | Stop reason: SURG

## 2020-11-23 RX ORDER — EPHEDRINE SULFATE 50 MG/ML
5 INJECTION, SOLUTION INTRAVENOUS ONCE AS NEEDED
Status: DISCONTINUED | OUTPATIENT
Start: 2020-11-23 | End: 2020-11-23 | Stop reason: HOSPADM

## 2020-11-23 RX ORDER — HEPARIN SODIUM 1000 [USP'U]/ML
INJECTION, SOLUTION INTRAVENOUS; SUBCUTANEOUS AS NEEDED
Status: DISCONTINUED | OUTPATIENT
Start: 2020-11-23 | End: 2020-11-23 | Stop reason: HOSPADM

## 2020-11-23 RX ORDER — LIDOCAINE HYDROCHLORIDE 10 MG/ML
0.1 INJECTION, SOLUTION EPIDURAL; INFILTRATION; INTRACAUDAL; PERINEURAL ONCE AS NEEDED
Status: DISCONTINUED | OUTPATIENT
Start: 2020-11-23 | End: 2020-11-23 | Stop reason: HOSPADM

## 2020-11-23 RX ORDER — FAMOTIDINE 10 MG/ML
20 INJECTION, SOLUTION INTRAVENOUS ONCE
Status: COMPLETED | OUTPATIENT
Start: 2020-11-23 | End: 2020-11-23

## 2020-11-23 RX ORDER — SODIUM CHLORIDE 9 MG/ML
INJECTION, SOLUTION INTRAVENOUS CONTINUOUS PRN
Status: DISCONTINUED | OUTPATIENT
Start: 2020-11-23 | End: 2020-11-23 | Stop reason: HOSPADM

## 2020-11-23 RX ORDER — DIPHENHYDRAMINE HCL 25 MG
25 CAPSULE ORAL
Status: DISCONTINUED | OUTPATIENT
Start: 2020-11-23 | End: 2020-11-23 | Stop reason: HOSPADM

## 2020-11-23 RX ORDER — PROMETHAZINE HYDROCHLORIDE 25 MG/1
25 SUPPOSITORY RECTAL ONCE AS NEEDED
Status: DISCONTINUED | OUTPATIENT
Start: 2020-11-23 | End: 2020-11-23 | Stop reason: HOSPADM

## 2020-11-23 RX ORDER — ONDANSETRON 2 MG/ML
INJECTION INTRAMUSCULAR; INTRAVENOUS AS NEEDED
Status: DISCONTINUED | OUTPATIENT
Start: 2020-11-23 | End: 2020-11-23 | Stop reason: SURG

## 2020-11-23 RX ADMIN — ROCURONIUM BROMIDE 50 MG: 10 INJECTION INTRAVENOUS at 08:18

## 2020-11-23 RX ADMIN — PHENYLEPHRINE HYDROCHLORIDE 200 MCG: 10 INJECTION INTRAVENOUS at 09:33

## 2020-11-23 RX ADMIN — NEOSTIGMINE METHYLSULFATE 3.5 MG: 1 INJECTION INTRAMUSCULAR; INTRAVENOUS; SUBCUTANEOUS at 10:47

## 2020-11-23 RX ADMIN — EPHEDRINE SULFATE 5 MG: 50 INJECTION INTRAMUSCULAR; INTRAVENOUS; SUBCUTANEOUS at 10:01

## 2020-11-23 RX ADMIN — PROPOFOL 50 MG: 10 INJECTION, EMULSION INTRAVENOUS at 09:44

## 2020-11-23 RX ADMIN — FENTANYL CITRATE 50 MCG: 50 INJECTION INTRAMUSCULAR; INTRAVENOUS at 08:13

## 2020-11-23 RX ADMIN — PHENYLEPHRINE HYDROCHLORIDE 200 MCG: 10 INJECTION INTRAVENOUS at 09:49

## 2020-11-23 RX ADMIN — FAMOTIDINE 20 MG: 10 INJECTION INTRAVENOUS at 07:15

## 2020-11-23 RX ADMIN — PROPOFOL 50 MG: 10 INJECTION, EMULSION INTRAVENOUS at 09:19

## 2020-11-23 RX ADMIN — PHENYLEPHRINE HYDROCHLORIDE 50 MCG: 10 INJECTION INTRAVENOUS at 09:44

## 2020-11-23 RX ADMIN — ONDANSETRON HYDROCHLORIDE 4 MG: 2 SOLUTION INTRAMUSCULAR; INTRAVENOUS at 10:44

## 2020-11-23 RX ADMIN — PROPOFOL 150 MG: 10 INJECTION, EMULSION INTRAVENOUS at 08:16

## 2020-11-23 RX ADMIN — GLYCOPYRROLATE 0.4 MG: 0.2 INJECTION INTRAMUSCULAR; INTRAVENOUS at 10:47

## 2020-11-23 RX ADMIN — AMLODIPINE BESYLATE 10 MG: 10 TABLET ORAL at 18:22

## 2020-11-23 RX ADMIN — SODIUM CHLORIDE 75 ML/HR: 9 INJECTION, SOLUTION INTRAVENOUS at 07:06

## 2020-11-23 RX ADMIN — LEVOTHYROXINE SODIUM 25 MCG: 25 TABLET ORAL at 18:22

## 2020-11-23 RX ADMIN — FENTANYL CITRATE 50 MCG: 50 INJECTION INTRAMUSCULAR; INTRAVENOUS at 09:19

## 2020-11-23 RX ADMIN — PHENYLEPHRINE HYDROCHLORIDE 100 MCG: 10 INJECTION INTRAVENOUS at 09:30

## 2020-11-23 RX ADMIN — APIXABAN 5 MG: 5 TABLET, FILM COATED ORAL at 18:22

## 2020-11-23 RX ADMIN — PHENYLEPHRINE HYDROCHLORIDE 100 MCG: 10 INJECTION INTRAVENOUS at 09:27

## 2020-11-23 RX ADMIN — SODIUM CHLORIDE, PRESERVATIVE FREE 3 ML: 5 INJECTION INTRAVENOUS at 21:42

## 2020-11-23 RX ADMIN — MIDAZOLAM 1 MG: 1 INJECTION INTRAMUSCULAR; INTRAVENOUS at 07:56

## 2020-11-23 NOTE — ANESTHESIA PROCEDURE NOTES
Airway  Urgency: elective    Date/Time: 11/23/2020 8:19 AM  Airway not difficult    General Information and Staff    Patient location during procedure: OR  Anesthesiologist: Terrance Lopes MD  CRNA: Mayte Hutson CRNA    Indications and Patient Condition  Indications for airway management: airway protection    Preoxygenated: yes  Mask difficulty assessment: 1 - vent by mask    Final Airway Details  Final airway type: endotracheal airway      Successful airway: ETT  Cuffed: yes   Successful intubation technique: direct laryngoscopy  Endotracheal tube insertion site: oral  Blade: Elin  Blade size: 4  ETT size (mm): 7.5  Cormack-Lehane Classification: grade I - full view of glottis  Placement verified by: chest auscultation and capnometry   Measured from: lips  ETT/EBT  to lips (cm): 22  Number of attempts at approach: 1  Assessment: lips, teeth, and gum same as pre-op and atraumatic intubation    Additional Comments  Smooth IV/mask induction/intubation. Easy bag-mask ventilation. Orally intubated, easy, atraumatic, lips/teeth/mouth left intact, as preop. Direct visual of vocal cords. +ETCO2, bilateral breath sounds and equal.

## 2020-11-23 NOTE — ANESTHESIA POSTPROCEDURE EVALUATION
Patient: Quincy Roberts    Procedure Summary     Date: 11/23/20 Room / Location: [facility] CATH/EP LAB 5 / [facility] CATH INVASIVE LOCATION    Anesthesia Start: 0759 Anesthesia Stop: 1112    Procedures:       Ablation atrial fibrillation (N/A )      3D MAPPING CARTO EP (N/A ) Diagnosis:       AF (paroxysmal atrial fibrillation) (CMS/HCC)      (atrial fibrillation)    Provider: Brandyn Olivier MD Provider: Terrance Lopes MD    Anesthesia Type: general ASA Status: 4          Anesthesia Type: general    Vitals  Vitals Value Taken Time   BP     Temp     Pulse 65 11/23/20 1121   Resp     SpO2 96 % 11/23/20 1121   Vitals shown include unvalidated device data.        Post Anesthesia Care and Evaluation    Patient location during evaluation: PACU  Patient participation: complete - patient participated  Level of consciousness: awake and alert  Pain management: adequate  Airway patency: patent  Anesthetic complications: No anesthetic complications    Cardiovascular status: acceptable  Respiratory status: acceptable  Hydration status: acceptable    Comments: ---------------------------               11/23/20                      0707         ---------------------------   BP:          141/74         Pulse:         72           Resp:          16           Temp:   37.2 °C (98.9 °F)   SpO2:          97%         ---------------------------

## 2020-11-23 NOTE — DISCHARGE INSTRUCTIONS
[facility]  Cardiology  [address] [place] [address]  [phone]    Coronary Ablation After Care    Refer to this sheet in the next few weeks. These instructions provide you with information on caring for yourself after your procedure. Your health care provider may also give you more specific instructions. Your treatment has been planned according to current medical practices, but problems sometimes occur. Call your health care provider if you have any problems or questions after your procedure.      What to Expect After the Procedure:  After your procedure, it is typical to have the following sensations:  · Minor discomfort or tenderness and a small bump at the catheter insertion site(s). The bump(s) should usually decrease in size and tenderness within 1 to 2 weeks.  · Any bruising will usually fade within 2 to 4 weeks.  Home Care Instructions:  · Do not apply powder or lotion to the site.  · Do not take baths, swim, or use a hot tub until your health care provider approves and the site is completely healed.  · Do not bend, squat, or lift anything over 20 lb (9 kg) or as directed by your health care provider. However, we recommend lifting nothing heavier than a gallon of milk.    · You may shower 24 hours after the procedure. Remove the bandage (dressing) and gently wash the site with plain soap and water. Gently pat the site dry. You may apply a band aid daily for 2 days if desired.    · Inspect the site at least twice daily.  · Increase your fluid intake for the next 2 days.    · Limit your activity for the first 48 hours.   · Avoid strenuous activity for 1 week or as advised by your physician.    · Follow instructions about when you can drive or return to work as directed by your physician.    · Hold direct pressure over the site when you cough, sneeze, laugh or change positions.  Do this for the next 2 days.    Call Your Doctor If:  · You have drainage (other than a small amount of  blood on the dressing).  · You have chills or a fever > 101.  · You have redness, warmth, swelling (larger than a walnut), or pain at the insertion   · You develop palpitations, chest pain or shortness of breath, feel faint, or pass out.  · You develop pain, discoloration, coldness, numbness, tingling, or severe bruising in the leg that held the catheter.  · You develop bleeding from any other place, such as the bowels.  · You have heavy bleeding from the site.  If this happens, hold pressure on the site and call 911.        Make Sure You:  · Understand these instructions.  · Will watch your condition.  · Will get help right away if you are not doing well or get worse.

## 2020-11-23 NOTE — ANESTHESIA PREPROCEDURE EVALUATION
" Anesthesia Evaluation     Patient summary reviewed and Nursing notes reviewed                Airway   Mallampati: II  TM distance: >3 FB  Neck ROM: limited  Dental      Pulmonary - negative pulmonary ROS   Cardiovascular     ECG reviewed  PT is on anticoagulation therapy  Rhythm: irregular    (+) hypertension, valvular problems/murmurs, dysrhythmias Atrial Fib, hyperlipidemia,       Neuro/Psych- negative ROS  GI/Hepatic/Renal/Endo    (+)  GERD,  renal disease,     Musculoskeletal     Abdominal    Substance History - negative use     OB/GYN negative ob/gyn ROS         Other   arthritis,    history of cancer                    Anesthesia Plan    ASA 4     general   (Goes by \"Russ\")  intravenous induction     Anesthetic plan, all risks, benefits, and alternatives have been provided, discussed and informed consent has been obtained with: patient.      "

## 2020-11-24 VITALS
BODY MASS INDEX: 25.77 KG/M2 | HEART RATE: 59 BPM | TEMPERATURE: 98 F | HEIGHT: 70 IN | RESPIRATION RATE: 16 BRPM | SYSTOLIC BLOOD PRESSURE: 142 MMHG | WEIGHT: 180 LBS | OXYGEN SATURATION: 90 % | DIASTOLIC BLOOD PRESSURE: 72 MMHG

## 2020-11-24 LAB — QT INTERVAL: 377 MS

## 2020-11-24 PROCEDURE — S0260 H&P FOR SURGERY: HCPCS | Performed by: INTERNAL MEDICINE

## 2020-11-24 PROCEDURE — 63710000001 ACETAMINOPHEN 325 MG TABLET: Performed by: INTERNAL MEDICINE

## 2020-11-24 PROCEDURE — 63710000001 APIXABAN 5 MG TABLET: Performed by: INTERNAL MEDICINE

## 2020-11-24 PROCEDURE — A9270 NON-COVERED ITEM OR SERVICE: HCPCS | Performed by: INTERNAL MEDICINE

## 2020-11-24 PROCEDURE — 99217 PR OBSERVATION CARE DISCHARGE MANAGEMENT: CPT | Performed by: NURSE PRACTITIONER

## 2020-11-24 PROCEDURE — 93005 ELECTROCARDIOGRAM TRACING: CPT | Performed by: INTERNAL MEDICINE

## 2020-11-24 PROCEDURE — 63710000001 LEVOTHYROXINE 25 MCG TABLET: Performed by: INTERNAL MEDICINE

## 2020-11-24 PROCEDURE — 63710000001 PANTOPRAZOLE 40 MG TABLET DELAYED-RELEASE: Performed by: INTERNAL MEDICINE

## 2020-11-24 PROCEDURE — 63710000001 AMLODIPINE 10 MG TABLET: Performed by: INTERNAL MEDICINE

## 2020-11-24 PROCEDURE — 93010 ELECTROCARDIOGRAM REPORT: CPT | Performed by: INTERNAL MEDICINE

## 2020-11-24 PROCEDURE — G0378 HOSPITAL OBSERVATION PER HR: HCPCS

## 2020-11-24 RX ADMIN — ACETAMINOPHEN 650 MG: 325 TABLET, FILM COATED ORAL at 10:36

## 2020-11-24 RX ADMIN — LEVOTHYROXINE SODIUM 25 MCG: 25 TABLET ORAL at 07:04

## 2020-11-24 RX ADMIN — SODIUM CHLORIDE, PRESERVATIVE FREE 3 ML: 5 INJECTION INTRAVENOUS at 09:35

## 2020-11-24 RX ADMIN — SODIUM CHLORIDE 75 ML/HR: 9 INJECTION, SOLUTION INTRAVENOUS at 00:08

## 2020-11-24 RX ADMIN — PANTOPRAZOLE SODIUM 40 MG: 40 TABLET, DELAYED RELEASE ORAL at 07:05

## 2020-11-24 RX ADMIN — APIXABAN 5 MG: 5 TABLET, FILM COATED ORAL at 09:35

## 2020-11-24 RX ADMIN — AMLODIPINE BESYLATE 10 MG: 10 TABLET ORAL at 07:04

## 2020-11-24 NOTE — PROGRESS NOTES
Case Management Discharge Note      Final Note: Home         Selected Continued Care - Discharged on 11/24/2020 Admission date: 11/23/2020 - Discharge disposition: Home or Self Care    Destination    No services have been selected for the patient.              Durable Medical Equipment    No services have been selected for the patient.              Dialysis/Infusion    No services have been selected for the patient.              Home Medical Care    No services have been selected for the patient.              Therapy    No services have been selected for the patient.              Community Resources    No services have been selected for the patient.                  Transportation Services  Private: Car    Final Discharge Disposition Code: 01 - home or self-care

## 2020-11-24 NOTE — DISCHARGE SUMMARY
DISCHARGE NOTE    Patient Name: Quincy Roberts  Age/Sex: 80 y.o. male  : 1940  MRN: [id number]    Date of Discharge:  2020   Date of Admit: 2020  Encounter Provider: JANNY Naranjo  Place of Service: [facility] CARDIOLOGY  Patient Care Team:  Jose Valle MD as PCP - General  Jose Valle MD as Referring Physician (Family Medicine)  Adolph Martell MD as Consulting Physician (Hematology and Oncology)  Elmira Cabezas MD as Consulting Physician (Cardiology)    Subjective:     Discharge Diagnosis:    AF (paroxysmal atrial fibrillation) (CMS/[facility])      Hospital Course:     80-year-old patient of Dr. Cabezas who was referred to  for symptomatic paroxysmal atrial fib, intolerant to beta-blockers.  He underwent pulmonary vein isolation as well as a CTI dependent flutter ablation yesterday.  He tolerated the procedure well.  He has maintained sinus rhythm post procedure.  He is stable for discharge home.  He will follow-up with Dr. Olivier in the office in 4 weeks.    Vital Signs  Temp:  [97.5 °F (36.4 °C)-98 °F (36.7 °C)] 98 °F (36.7 °C)  Heart Rate:  [59-82] 59  Resp:  [16] 16  BP: (104-142)/(51-84) 142/72    Intake/Output Summary (Last 24 hours) at 2020 0837  Last data filed at 2020 0833  Gross per 24 hour   Intake 1818.2 ml   Output 140 ml   Net 1678.2 ml       Physical Exam:    General Appearance: No acute distress, well developed and well nourished.   Eyes: Conjunctiva and lids: No erythema, swelling, or discharge. Sclera non-icteric.   HENT: Atraumatic, normocephalic. External eyes, ears, and nose normal.   Respiratory: No signs of respiratory distress. Respiration rhythm and depth normal.   Clear to auscultation. No rales, crackles, rhonchi, or wheezing auscultated.   Cardiovascular:  Jugular Venous Pressure:  Normal  Heart Rate and Rhythm: Normal, Heart Sounds: Normal S1 and S2. No S3 or S4 noted.  Murmurs: No murmurs noted. No rubs, thrills, or gallops.   Arterial Pulses: Posterior tibialis and dorsalis pedis pulses normal.   Lower Extremities: No edema noted.  Gastrointestinal:  Abdomen soft, non-distended, non-tender.  Musculoskeletal: Normal movement of extremities  Skin: Warm and dry.   Psychiatric: Patient alert and oriented to person, place, and time. Speech and behavior appropriate. Normal mood and affect.    Labs:   Results from last 7 days   Lab Units 11/20/20  1026   SODIUM mmol/L 138   POTASSIUM mmol/L 3.4*   CHLORIDE mmol/L 103   CO2 mmol/L 24.7   BUN mg/dL 8   CREATININE mg/dL 0.98   GLUCOSE mg/dL 115*   CALCIUM mg/dL 8.9         Results from last 7 days   Lab Units 11/20/20  1026   WBC 10*3/mm3 4.14   HEMOGLOBIN g/dL 12.1*   HEMATOCRIT % 35.8*   PLATELETS 10*3/mm3 201         Discharge Diet:    Dietary Orders (From admission, onward)     Start     Ordered    11/23/20 1205  Diet Regular  Diet Effective Now     Question:  Diet Texture / Consistency  Answer:  Regular    11/23/20 1204                  Activity at Discharge:  Instructions given to patient    Discharge Medications     Discharge Medications      Continue These Medications      Instructions Start Date   amLODIPine 10 MG tablet  Commonly known as: NORVASC   TAKE 1 TABLET EVERY MORNING      apixaban 5 MG tablet tablet  Commonly known as: ELIQUIS   5 mg, Oral, Every 12 Hours Scheduled      coenzyme Q10 50 MG capsule capsule   50 mg, Oral, Daily      doxazosin 4 MG tablet  Commonly known as: CARDURA   4 mg, Oral, Nightly      fish oil 1000 MG capsule capsule   1,000 mg, Oral, Daily With Breakfast, Omega Q plus      fluticasone 50 MCG/ACT nasal spray  Commonly known as: FLONASE   2 sprays, Nasal, Daily      levothyroxine 25 MCG tablet  Commonly known as: SYNTHROID, LEVOTHROID   25 mcg, Oral, Daily      nexIUM 40 MG capsule  Generic drug: esomeprazole    40 mg, Oral, Every Morning Before Breakfast      PROBIOTIC ACIDOPHILUS PO   1 tablet, Oral, Daily      TYLENOL PO   1 tablet, Oral, As Needed      VITAMIN D3 PO   1,000 mg, Oral, Daily             Discharge disposition: home    Follow-up Appointments  Follow-up Information     Brandyn Olivier MD Follow up in 1 month(s).    Specialties: Cardiology, Cardiac Electrophysiology  Contact information:  [address]  [facility] [address]  [facility] [id number] [phone]             Jose Valle MD .    Specialty: Family Medicine  Contact information:  [address] [facility]  [address]  [facility] [address] [phone]                 Future Appointments   Date Time Provider Department Center   12/22/2020  9:45 AM Brandyn Olivier MD [facility] None   1/7/2021 10:20 AM Marco Barnard MD [facility]   1/13/2021 10:40 AM Elmira Cabezas MD [facility] None         JANNY Naranjo  11/24/20  08:37 EST

## 2020-11-25 NOTE — H&P
Patient Care Team:  Jose Valle MD as PCP - General  Jose Valle MD as Referring Physician (Family Medicine)  Adolph Martell MD as Consulting Physician (Hematology and Oncology)  Elmira Cabezas MD as Consulting Physician (Cardiology)    Chief complaint   Atrial Fibrillation    Subjective    Quincy Roberts is a 80 y.o. male who presents today for evaluation and treatment of paroxysmal atrial fibrillation.  Patient was first diagnosed with atrial fibrillation 1 month ago, after transient tachycardia and palpitations with associated dizziness at home.  He has had 3 or 4 similar episodes since, 2 of which resulted in hospital admissions.  He has had a lot of difficulty tolerating beta-blockers in the past.  After his most recent episode of atrial fibrillation he presented to the emergency room and got a dose of IV metoprolol.  He went home and had profound hypotension, felt like he was going to pass out.  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  He does not drink alcohol.  He does not have a history of sleep apnea    Since our last visit, the patient has continued to experience recurrent symptomatic episodes of palpitations.  The most recent episode was this Friday, while he was at home, and lasted a few hours.  We discussed ablation and he still wishes to proceed.     Review of Systems   Constitutional: Positive for fatigue. Negative for activity change.   Eyes: Negative.    Respiratory: Negative for chest tightness and shortness of breath.    Cardiovascular: Negative for chest pain, palpitations and leg swelling.   Gastrointestinal: Negative.    Endocrine: Negative.    Genitourinary: Negative.    Musculoskeletal: Negative.    Skin: Negative.    Neurological: Negative for dizziness and syncope.   Hematological: Negative.    Psychiatric/Behavioral: Negative.         Past History:  Medical History: has a  past medical history of Anemia, Diverticulosis, GERD (gastroesophageal reflux disease), Health care maintenance, History of colon polyps, History of jaundice, History of prostate cancer (2014), History of radiation therapy, Hyperlipidemia, Hypertension, Kidney stones, Osteoarthritis, Prostate cancer (CMS/HCC) (2014), and Pyogenic arthritis of left knee joint (CMS/[facility]).   Surgical History: has a past surgical history that includes Total hip arthroplasty (Right, 2011); Rotator cuff repair (Left, 2003); Esophagogastroduodenoscopy (N/A, 8/2/2017); Hernia repair (2010); Colonoscopy (N/A, 8/10/2018); Cardiac catheterization; and Cardiac electrophysiology procedure (N/A, 11/23/2020).   Family History: family history includes Breast cancer (age of onset: 70) in his mother; Dementia in his mother.   Social History: reports that he has never smoked. He has never used smokeless tobacco. He reports that he does not drink alcohol or use drugs.    No medications prior to admission.      Allergies: Metoclopramide, Carvedilol, Hctz [hydrochlorothiazide], Bystolic [nebivolol hcl], and Spironolactone    Objective      Vital Signs       Physical Exam  Vitals signs and nursing note reviewed.   Constitutional:       Appearance: Normal appearance.   HENT:      Head: Normocephalic and atraumatic.   Cardiovascular:      Rate and Rhythm: Normal rate and regular rhythm.   Pulmonary:      Effort: Pulmonary effort is normal.      Breath sounds: Normal breath sounds.   Abdominal:      General: Abdomen is flat.      Palpations: Abdomen is soft.   Skin:     General: Skin is warm and dry.   Neurological:      General: No focal deficit present.      Mental Status: He is alert and oriented to person, place, and time.   Psychiatric:         Attention and Perception: Attention and perception normal.         Mood and Affect: Mood normal.         Speech: Speech normal.         Behavior: Behavior normal.         Cognition and Memory: Cognition and memory  normal.         Results Review:   I reviewed the patient's new clinical results.      Assessment/Plan       AF (paroxysmal atrial fibrillation) (CMS/[facility])      Assessment & Plan     Paroxysmal atrial fibrillation poorly tolerant of AV rodo blocking agents, concern for tolerating 1c agent.  We have elected to proceed with ablation.  The risk of ablation were discussed and he wishes to proceed.     I discussed the patients findings and my recommendations with patient    Brandyn Olivier MD  11/25/20  10:28 EST

## 2020-11-26 ENCOUNTER — HOSPITAL ENCOUNTER (OUTPATIENT)
Facility: HOSPITAL | Age: 80
Setting detail: OBSERVATION
Discharge: HOME | End: 2020-11-27
Attending: EMERGENCY MEDICINE | Admitting: INTERNAL MEDICINE

## 2020-11-26 ENCOUNTER — NURSE TRIAGE (OUTPATIENT)
Dept: CALL CENTER | Facility: HOSPITAL | Age: 80
End: 2020-11-26

## 2020-11-26 ENCOUNTER — APPOINTMENT (OUTPATIENT)
Dept: GENERAL RADIOLOGY | Facility: HOSPITAL | Age: 80
End: 2020-11-26

## 2020-11-26 DIAGNOSIS — R94.31 ABNORMAL EKG: ICD-10-CM

## 2020-11-26 DIAGNOSIS — I49.5 TACHY-BRADY SYNDROME (HCC): Primary | ICD-10-CM

## 2020-11-26 DIAGNOSIS — Z86.79 STATUS POST ABLATION OF ATRIAL FIBRILLATION: ICD-10-CM

## 2020-11-26 DIAGNOSIS — I48.91 ATRIAL FIBRILLATION, UNSPECIFIED TYPE (HCC): ICD-10-CM

## 2020-11-26 DIAGNOSIS — Z98.890 STATUS POST ABLATION OF ATRIAL FIBRILLATION: ICD-10-CM

## 2020-11-26 LAB
ALBUMIN SERPL-MCNC: 4.2 G/DL (ref 3.5–5.2)
ALBUMIN/GLOB SERPL: 1.4 G/DL
ALP SERPL-CCNC: 69 U/L (ref 39–117)
ALT SERPL W P-5'-P-CCNC: 20 U/L (ref 1–41)
ANION GAP SERPL CALCULATED.3IONS-SCNC: 13.7 MMOL/L (ref 5–15)
AST SERPL-CCNC: 22 U/L (ref 1–40)
B PARAPERT DNA SPEC QL NAA+PROBE: NOT DETECTED
B PERT DNA SPEC QL NAA+PROBE: NOT DETECTED
BASOPHILS # BLD AUTO: 0.02 10*3/MM3 (ref 0–0.2)
BASOPHILS NFR BLD AUTO: 0.4 % (ref 0–1.5)
BILIRUB SERPL-MCNC: 1 MG/DL (ref 0–1.2)
BUN SERPL-MCNC: 8 MG/DL (ref 8–23)
BUN/CREAT SERPL: 8.2 (ref 7–25)
C PNEUM DNA NPH QL NAA+NON-PROBE: NOT DETECTED
CALCIUM SPEC-SCNC: 9.5 MG/DL (ref 8.6–10.5)
CHLORIDE SERPL-SCNC: 103 MMOL/L (ref 98–107)
CO2 SERPL-SCNC: 22.3 MMOL/L (ref 22–29)
CREAT SERPL-MCNC: 0.97 MG/DL (ref 0.76–1.27)
DEPRECATED RDW RBC AUTO: 49.9 FL (ref 37–54)
EOSINOPHIL # BLD AUTO: 0.03 10*3/MM3 (ref 0–0.4)
EOSINOPHIL NFR BLD AUTO: 0.6 % (ref 0.3–6.2)
ERYTHROCYTE [DISTWIDTH] IN BLOOD BY AUTOMATED COUNT: 13.1 % (ref 12.3–15.4)
FLUAV SUBTYP SPEC NAA+PROBE: NOT DETECTED
FLUBV RNA ISLT QL NAA+PROBE: NOT DETECTED
GFR SERPL CREATININE-BSD FRML MDRD: 74 ML/MIN/1.73
GLOBULIN UR ELPH-MCNC: 3 GM/DL
GLUCOSE SERPL-MCNC: 137 MG/DL (ref 65–99)
HADV DNA SPEC NAA+PROBE: NOT DETECTED
HCOV 229E RNA SPEC QL NAA+PROBE: NOT DETECTED
HCOV HKU1 RNA SPEC QL NAA+PROBE: NOT DETECTED
HCOV NL63 RNA SPEC QL NAA+PROBE: NOT DETECTED
HCOV OC43 RNA SPEC QL NAA+PROBE: NOT DETECTED
HCT VFR BLD AUTO: 36.9 % (ref 37.5–51)
HGB BLD-MCNC: 12.7 G/DL (ref 13–17.7)
HMPV RNA NPH QL NAA+NON-PROBE: NOT DETECTED
HPIV1 RNA SPEC QL NAA+PROBE: NOT DETECTED
HPIV2 RNA SPEC QL NAA+PROBE: NOT DETECTED
HPIV3 RNA NPH QL NAA+PROBE: NOT DETECTED
HPIV4 P GENE NPH QL NAA+PROBE: NOT DETECTED
IMM GRANULOCYTES # BLD AUTO: 0.03 10*3/MM3 (ref 0–0.05)
IMM GRANULOCYTES NFR BLD AUTO: 0.6 % (ref 0–0.5)
LYMPHOCYTES # BLD AUTO: 1.09 10*3/MM3 (ref 0.7–3.1)
LYMPHOCYTES NFR BLD AUTO: 20.2 % (ref 19.6–45.3)
M PNEUMO IGG SER IA-ACNC: NOT DETECTED
MAGNESIUM SERPL-MCNC: 1.9 MG/DL (ref 1.6–2.4)
MCH RBC QN AUTO: 35.4 PG (ref 26.6–33)
MCHC RBC AUTO-ENTMCNC: 34.4 G/DL (ref 31.5–35.7)
MCV RBC AUTO: 102.8 FL (ref 79–97)
MONOCYTES # BLD AUTO: 0.63 10*3/MM3 (ref 0.1–0.9)
MONOCYTES NFR BLD AUTO: 11.7 % (ref 5–12)
NEUTROPHILS NFR BLD AUTO: 3.6 10*3/MM3 (ref 1.7–7)
NEUTROPHILS NFR BLD AUTO: 66.5 % (ref 42.7–76)
NRBC BLD AUTO-RTO: 0 /100 WBC (ref 0–0.2)
NT-PROBNP SERPL-MCNC: 2209 PG/ML (ref 0–1800)
PLATELET # BLD AUTO: 178 10*3/MM3 (ref 140–450)
PMV BLD AUTO: 10.2 FL (ref 6–12)
POTASSIUM SERPL-SCNC: 2.8 MMOL/L (ref 3.5–5.2)
PROT SERPL-MCNC: 7.2 G/DL (ref 6–8.5)
RBC # BLD AUTO: 3.59 10*6/MM3 (ref 4.14–5.8)
RHINOVIRUS RNA SPEC NAA+PROBE: NOT DETECTED
RSV RNA NPH QL NAA+NON-PROBE: NOT DETECTED
SARS-COV-2 RNA NPH QL NAA+NON-PROBE: NOT DETECTED
SODIUM SERPL-SCNC: 139 MMOL/L (ref 136–145)
TROPONIN T SERPL-MCNC: 0.28 NG/ML (ref 0–0.03)
WBC # BLD AUTO: 5.4 10*3/MM3 (ref 3.4–10.8)

## 2020-11-26 PROCEDURE — 71045 X-RAY EXAM CHEST 1 VIEW: CPT

## 2020-11-26 PROCEDURE — 25010000002 MAGNESIUM SULFATE 2 GM/50ML SOLUTION: Performed by: INTERNAL MEDICINE

## 2020-11-26 PROCEDURE — G0378 HOSPITAL OBSERVATION PER HR: HCPCS

## 2020-11-26 PROCEDURE — 93005 ELECTROCARDIOGRAM TRACING: CPT | Performed by: EMERGENCY MEDICINE

## 2020-11-26 PROCEDURE — 99220 PR INITIAL OBSERVATION CARE/DAY 70 MINUTES: CPT | Performed by: INTERNAL MEDICINE

## 2020-11-26 PROCEDURE — 96365 THER/PROPH/DIAG IV INF INIT: CPT

## 2020-11-26 PROCEDURE — 99284 EMERGENCY DEPT VISIT MOD MDM: CPT

## 2020-11-26 PROCEDURE — 83735 ASSAY OF MAGNESIUM: CPT | Performed by: EMERGENCY MEDICINE

## 2020-11-26 PROCEDURE — 80053 COMPREHEN METABOLIC PANEL: CPT | Performed by: EMERGENCY MEDICINE

## 2020-11-26 PROCEDURE — 84484 ASSAY OF TROPONIN QUANT: CPT | Performed by: EMERGENCY MEDICINE

## 2020-11-26 PROCEDURE — 83880 ASSAY OF NATRIURETIC PEPTIDE: CPT | Performed by: EMERGENCY MEDICINE

## 2020-11-26 PROCEDURE — 0202U NFCT DS 22 TRGT SARS-COV-2: CPT | Performed by: INTERNAL MEDICINE

## 2020-11-26 PROCEDURE — 85025 COMPLETE CBC W/AUTO DIFF WBC: CPT | Performed by: EMERGENCY MEDICINE

## 2020-11-26 PROCEDURE — 96366 THER/PROPH/DIAG IV INF ADDON: CPT

## 2020-11-26 PROCEDURE — 96375 TX/PRO/DX INJ NEW DRUG ADDON: CPT

## 2020-11-26 PROCEDURE — 93010 ELECTROCARDIOGRAM REPORT: CPT | Performed by: INTERNAL MEDICINE

## 2020-11-26 RX ORDER — AMIODARONE HYDROCHLORIDE 200 MG/1
400 TABLET ORAL
Status: DISCONTINUED | OUTPATIENT
Start: 2020-11-26 | End: 2020-11-26

## 2020-11-26 RX ORDER — ONDANSETRON 2 MG/ML
4 INJECTION INTRAMUSCULAR; INTRAVENOUS EVERY 6 HOURS PRN
Status: DISCONTINUED | OUTPATIENT
Start: 2020-11-26 | End: 2020-11-27 | Stop reason: HOSPADM

## 2020-11-26 RX ORDER — POTASSIUM CHLORIDE 750 MG/1
40 TABLET, FILM COATED, EXTENDED RELEASE ORAL ONCE
Status: COMPLETED | OUTPATIENT
Start: 2020-11-26 | End: 2020-11-26

## 2020-11-26 RX ORDER — NITROGLYCERIN 0.4 MG/1
0.4 TABLET SUBLINGUAL
Status: DISCONTINUED | OUTPATIENT
Start: 2020-11-26 | End: 2020-11-27 | Stop reason: HOSPADM

## 2020-11-26 RX ORDER — ACETAMINOPHEN 325 MG/1
650 TABLET ORAL EVERY 6 HOURS PRN
Status: DISCONTINUED | OUTPATIENT
Start: 2020-11-26 | End: 2020-11-27 | Stop reason: HOSPADM

## 2020-11-26 RX ORDER — DILTIAZEM HYDROCHLORIDE 5 MG/ML
10 INJECTION INTRAVENOUS ONCE
Status: COMPLETED | OUTPATIENT
Start: 2020-11-26 | End: 2020-11-26

## 2020-11-26 RX ORDER — AMIODARONE HYDROCHLORIDE 200 MG/1
400 TABLET ORAL EVERY 12 HOURS SCHEDULED
Status: DISCONTINUED | OUTPATIENT
Start: 2020-11-26 | End: 2020-11-27

## 2020-11-26 RX ORDER — MAGNESIUM SULFATE HEPTAHYDRATE 40 MG/ML
2 INJECTION, SOLUTION INTRAVENOUS ONCE
Status: COMPLETED | OUTPATIENT
Start: 2020-11-26 | End: 2020-11-26

## 2020-11-26 RX ADMIN — POTASSIUM CHLORIDE 40 MEQ: 750 TABLET, EXTENDED RELEASE ORAL at 17:47

## 2020-11-26 RX ADMIN — POTASSIUM CHLORIDE 40 MEQ: 750 TABLET, EXTENDED RELEASE ORAL at 16:58

## 2020-11-26 RX ADMIN — AMIODARONE HYDROCHLORIDE 400 MG: 200 TABLET ORAL at 21:00

## 2020-11-26 RX ADMIN — APIXABAN 5 MG: 5 TABLET, FILM COATED ORAL at 21:15

## 2020-11-26 RX ADMIN — DILTIAZEM HYDROCHLORIDE 10 MG: 5 INJECTION INTRAVENOUS at 15:16

## 2020-11-26 RX ADMIN — MAGNESIUM SULFATE HEPTAHYDRATE 2 G: 40 INJECTION, SOLUTION INTRAVENOUS at 17:48

## 2020-11-26 NOTE — TELEPHONE ENCOUNTER
"He had ablation on Monday 11/23/2020, and he was discharged from [facility].  He has A fib at  6 am. it has not improved. BP is 130/ 111-heart rate is 156. He is not dizzy, he does not feel well. 141/104. He is very sob, and winded, gave the patient the phone number, to the MD. Advised to go to the ED.     Reason for Disposition  • Patient sounds very sick or weak to the triager    Additional Information  • Negative: Sounds like a life-threatening emergency to the triager  • Negative: Chest pain  • Negative: Difficulty breathing  • Negative: Acting confused (e.g., disoriented, slurred speech) or excessively sleepy  • Negative: Surgical incision symptoms and questions  • Negative: [1] Discomfort (pain, burning or stinging) when passing urine AND [2] male  • Negative: [1] Discomfort (pain, burning or stinging) when passing urine AND [2] female  • Negative: Constipation  • Negative: New or worsening leg (calf, thigh) pain  • Negative: New or worsening leg swelling  • Negative: Dizziness is severe, or persists > 24 hours after surgery  • Negative: Pain, redness, swelling, or pus at IV Site  • Negative: Symptoms arising from use of a urinary catheter (Castro or Coude)  • Negative: Cast problems or questions  • Negative: Medication question  • Negative: [1] Widespread rash AND [2] bright red, sunburn-like  • Negative: [1] SEVERE headache AND [2] after spinal (epidural) anesthesia  • Negative: [1] Vomiting AND [2] persists > 4 hours  • Negative: [1] Vomiting AND [2] abdomen looks much more swollen than usual  • Negative: [1] Drinking very little AND [2] dehydration suspected (e.g., no urine > 12 hours, very dry mouth, very lightheaded)    Answer Assessment - Initial Assessment Questions  1. SYMPTOM: \"What's the main symptom you're concerned about?\" (e.g., pain, fever, vomiting)      Fast heart rate, soa, not feeling well. BP is 130/111  2. ONSET: \"When did *No Answer*  start?\"      6 am today   3. SURGERY: \"What surgery was " "performed?\"      Ablation   4. DATE of SURGERY: \"When was surgery performed?\"       11/23/2020  5. ANESTHESIA: \" What type of anesthesia did you have?\" (e.g., general, spinal, epidural, local)      Local   6. PAIN: \"Is there any pain?\" If so, ask: \"How bad is it?\"  (Scale 1-10; or mild, moderate, severe)      No pain   7. FEVER: \"Do you have a fever?\" If so, ask: \"What is your temperature, how was it measured, and when did it start?\"      n  8. VOMITING: \"Is there any vomiting?\" If yes, ask: \"How many times?\"      n  9. BLEEDING: \"Is there any bleeding?\" If so, ask: \"How much?\" and \"Where?\"      n  10. OTHER SYMPTOMS: \"Do you have any other symptoms?\" (e.g., drainage from wound, painful urination, constipation)        Not feeling well, fast heart rate, soa, high bp.    Protocols used: POST-OP SYMPTOMS AND QUESTIONS-ADULT-      "

## 2020-11-27 ENCOUNTER — APPOINTMENT (OUTPATIENT)
Dept: CARDIOLOGY | Facility: HOSPITAL | Age: 80
End: 2020-11-27

## 2020-11-27 VITALS
SYSTOLIC BLOOD PRESSURE: 140 MMHG | DIASTOLIC BLOOD PRESSURE: 78 MMHG | TEMPERATURE: 97.5 F | HEART RATE: 68 BPM | BODY MASS INDEX: 25.88 KG/M2 | OXYGEN SATURATION: 94 % | HEIGHT: 70 IN | WEIGHT: 180.78 LBS | RESPIRATION RATE: 18 BRPM

## 2020-11-27 LAB
ANION GAP SERPL CALCULATED.3IONS-SCNC: 10.4 MMOL/L (ref 5–15)
AORTIC ARCH: 2.6 CM
AORTIC DIMENSIONLESS INDEX: 0.8 (DI)
ASCENDING AORTA: 3.4 CM
BH CV ECHO MEAS - ACS: 1.9 CM
BH CV ECHO MEAS - AO MAX PG (FULL): 4.4 MMHG
BH CV ECHO MEAS - AO MAX PG: 8.8 MMHG
BH CV ECHO MEAS - AO MEAN PG (FULL): 2 MMHG
BH CV ECHO MEAS - AO MEAN PG: 4 MMHG
BH CV ECHO MEAS - AO ROOT AREA (BSA CORRECTED): 1.7
BH CV ECHO MEAS - AO ROOT AREA: 9.1 CM^2
BH CV ECHO MEAS - AO ROOT DIAM: 3.4 CM
BH CV ECHO MEAS - AO V2 MAX: 148 CM/SEC
BH CV ECHO MEAS - AO V2 MEAN: 96.9 CM/SEC
BH CV ECHO MEAS - AO V2 VTI: 30.6 CM
BH CV ECHO MEAS - ASC AORTA: 3.4 CM
BH CV ECHO MEAS - AVA(I,A): 2.6 CM^2
BH CV ECHO MEAS - AVA(I,D): 2.6 CM^2
BH CV ECHO MEAS - AVA(V,A): 2.4 CM^2
BH CV ECHO MEAS - AVA(V,D): 2.4 CM^2
BH CV ECHO MEAS - BSA(HAYCOCK): 2 M^2
BH CV ECHO MEAS - BSA: 2 M^2
BH CV ECHO MEAS - BZI_BMI: 26.2 KILOGRAMS/M^2
BH CV ECHO MEAS - BZI_METRIC_HEIGHT: 177 CM
BH CV ECHO MEAS - BZI_METRIC_WEIGHT: 82 KG
BH CV ECHO MEAS - CONTRAST EF (2CH): 68.7 CM2
BH CV ECHO MEAS - CONTRAST EF 4CH: 59.8 CM2
BH CV ECHO MEAS - EDV(CUBED): 171 ML
BH CV ECHO MEAS - EDV(MOD-SP2): 131 ML
BH CV ECHO MEAS - EDV(MOD-SP4): 132 ML
BH CV ECHO MEAS - EDV(TEICH): 150.5 ML
BH CV ECHO MEAS - EF(CUBED): 73.8 %
BH CV ECHO MEAS - EF(MOD-BP): 65 %
BH CV ECHO MEAS - EF(TEICH): 65 %
BH CV ECHO MEAS - ESV(CUBED): 44.7 ML
BH CV ECHO MEAS - ESV(MOD-SP2): 41 ML
BH CV ECHO MEAS - ESV(MOD-SP4): 53 ML
BH CV ECHO MEAS - ESV(TEICH): 52.6 ML
BH CV ECHO MEAS - FS: 36 %
BH CV ECHO MEAS - IVS/LVPW: 1.2
BH CV ECHO MEAS - IVSD: 1 CM
BH CV ECHO MEAS - LV DIASTOLIC VOL/BSA (35-75): 66.2 ML/M^2
BH CV ECHO MEAS - LV MASS(C)D: 195.5 GRAMS
BH CV ECHO MEAS - LV MASS(C)DI: 98.1 GRAMS/M^2
BH CV ECHO MEAS - LV MAX PG: 4.3 MMHG
BH CV ECHO MEAS - LV MEAN PG: 2 MMHG
BH CV ECHO MEAS - LV SYSTOLIC VOL/BSA (12-30): 26.6 ML/M^2
BH CV ECHO MEAS - LV V1 MAX: 104 CM/SEC
BH CV ECHO MEAS - LV V1 MEAN: 63.2 CM/SEC
BH CV ECHO MEAS - LV V1 VTI: 23 CM
BH CV ECHO MEAS - LVIDD: 5.6 CM
BH CV ECHO MEAS - LVIDS: 3.6 CM
BH CV ECHO MEAS - LVLD AP2: 8.6 CM
BH CV ECHO MEAS - LVLD AP4: 8.7 CM
BH CV ECHO MEAS - LVLS AP2: 6.2 CM
BH CV ECHO MEAS - LVLS AP4: 6.6 CM
BH CV ECHO MEAS - LVOT AREA (M): 3.5 CM^2
BH CV ECHO MEAS - LVOT AREA: 3.5 CM^2
BH CV ECHO MEAS - LVOT DIAM: 2.1 CM
BH CV ECHO MEAS - LVPWD: 0.85 CM
BH CV ECHO MEAS - MV A DUR: 0.31 SEC
BH CV ECHO MEAS - MV A MAX VEL: 59.6 CM/SEC
BH CV ECHO MEAS - MV DEC SLOPE: 210 CM/SEC^2
BH CV ECHO MEAS - MV DEC TIME: 261 SEC
BH CV ECHO MEAS - MV E MAX VEL: 75.8 CM/SEC
BH CV ECHO MEAS - MV E/A: 1.3
BH CV ECHO MEAS - MV MEAN PG: 2 MMHG
BH CV ECHO MEAS - MV P1/2T MAX VEL: 82.1 CM/SEC
BH CV ECHO MEAS - MV P1/2T: 114.5 MSEC
BH CV ECHO MEAS - MV V2 MEAN: 58.6 CM/SEC
BH CV ECHO MEAS - MV V2 VTI: 38.5 CM
BH CV ECHO MEAS - MVA P1/2T LCG: 2.7 CM^2
BH CV ECHO MEAS - MVA(P1/2T): 1.9 CM^2
BH CV ECHO MEAS - MVA(VTI): 2.1 CM^2
BH CV ECHO MEAS - PA ACC SLOPE: 32.4 CM/SEC^2
BH CV ECHO MEAS - PA ACC TIME: 0.12 SEC
BH CV ECHO MEAS - PA MAX PG: 4 MMHG
BH CV ECHO MEAS - PA PR(ACCEL): 25 MMHG
BH CV ECHO MEAS - PA V2 MAX: 100.1 CM/SEC
BH CV ECHO MEAS - PI END-D VEL: 89.6 CM/SEC
BH CV ECHO MEAS - PULM A REVS DUR: 0.12 SEC
BH CV ECHO MEAS - PULM A REVS VEL: 28.8 CM/SEC
BH CV ECHO MEAS - PULM DIAS VEL: 60.3 CM/SEC
BH CV ECHO MEAS - PULM S/D: 1.1
BH CV ECHO MEAS - PULM SYS VEL: 67.5 CM/SEC
BH CV ECHO MEAS - QP/QS: 0.84
BH CV ECHO MEAS - RAP SYSTOLE: 3 MMHG
BH CV ECHO MEAS - RV MEAN PG: 0 MMHG
BH CV ECHO MEAS - RV V1 MEAN: 25 CM/SEC
BH CV ECHO MEAS - RV V1 VTI: 6.9 CM
BH CV ECHO MEAS - RVOT AREA: 9.6 CM^2
BH CV ECHO MEAS - RVOT DIAM: 3.5 CM
BH CV ECHO MEAS - RVSP: 33.9 MMHG
BH CV ECHO MEAS - SI(AO): 139.4 ML/M^2
BH CV ECHO MEAS - SI(CUBED): 63.3 ML/M^2
BH CV ECHO MEAS - SI(LVOT): 40 ML/M^2
BH CV ECHO MEAS - SI(MOD-SP2): 45.2 ML/M^2
BH CV ECHO MEAS - SI(MOD-SP4): 39.6 ML/M^2
BH CV ECHO MEAS - SI(TEICH): 49.1 ML/M^2
BH CV ECHO MEAS - SUP REN AO DIAM: 2 CM
BH CV ECHO MEAS - SV(AO): 277.8 ML
BH CV ECHO MEAS - SV(CUBED): 126.2 ML
BH CV ECHO MEAS - SV(LVOT): 79.7 ML
BH CV ECHO MEAS - SV(MOD-SP2): 90 ML
BH CV ECHO MEAS - SV(MOD-SP4): 79 ML
BH CV ECHO MEAS - SV(RVOT): 66.8 ML
BH CV ECHO MEAS - SV(TEICH): 97.9 ML
BH CV ECHO MEAS - TAPSE (>1.6): 3.1 CM
BH CV ECHO MEAS - TR MAX VEL: 278 CM/SEC
BH CV VAS BP RIGHT ARM: NORMAL MMHG
BH CV XLRA - RV BASE: 3.7 CM
BH CV XLRA - RV LENGTH: 7.9 CM
BH CV XLRA - RV MID: 2.6 CM
BH CV XLRA - TDI S': 19.4 CM/SEC
BUN SERPL-MCNC: 10 MG/DL (ref 8–23)
BUN/CREAT SERPL: 8.2 (ref 7–25)
CALCIUM SPEC-SCNC: 8.9 MG/DL (ref 8.6–10.5)
CHLORIDE SERPL-SCNC: 107 MMOL/L (ref 98–107)
CO2 SERPL-SCNC: 24.6 MMOL/L (ref 22–29)
CREAT SERPL-MCNC: 1.22 MG/DL (ref 0.76–1.27)
GFR SERPL CREATININE-BSD FRML MDRD: 57 ML/MIN/1.73
GLUCOSE SERPL-MCNC: 115 MG/DL (ref 65–99)
LEFT ATRIUM VOLUME INDEX: 34.2 ML/M2
LV EF 2D ECHO EST: 65 %
MAGNESIUM SERPL-MCNC: 2.3 MG/DL (ref 1.6–2.4)
MAXIMAL PREDICTED HEART RATE: 140 BPM
POTASSIUM SERPL-SCNC: 4.3 MMOL/L (ref 3.5–5.2)
QT INTERVAL: 337 MS
QT INTERVAL: 412 MS
SINUS: 3.4 CM
SODIUM SERPL-SCNC: 142 MMOL/L (ref 136–145)
STJ: 2.8 CM
STRESS TARGET HR: 119 BPM

## 2020-11-27 PROCEDURE — 25010000002 PERFLUTREN (DEFINITY) 8.476 MG IN SODIUM CHLORIDE 0.9 % 10 ML INJECTION: Performed by: INTERNAL MEDICINE

## 2020-11-27 PROCEDURE — 93306 TTE W/DOPPLER COMPLETE: CPT | Performed by: INTERNAL MEDICINE

## 2020-11-27 PROCEDURE — 99217 PR OBSERVATION CARE DISCHARGE MANAGEMENT: CPT | Performed by: INTERNAL MEDICINE

## 2020-11-27 PROCEDURE — G0378 HOSPITAL OBSERVATION PER HR: HCPCS

## 2020-11-27 PROCEDURE — 93010 ELECTROCARDIOGRAM REPORT: CPT | Performed by: INTERNAL MEDICINE

## 2020-11-27 PROCEDURE — 83735 ASSAY OF MAGNESIUM: CPT | Performed by: INTERNAL MEDICINE

## 2020-11-27 PROCEDURE — 0296T HC EXT ECG > 48HR TO 21 DAY RCRD W/CONECT INTL RCRD: CPT

## 2020-11-27 PROCEDURE — 80048 BASIC METABOLIC PNL TOTAL CA: CPT | Performed by: INTERNAL MEDICINE

## 2020-11-27 PROCEDURE — 93306 TTE W/DOPPLER COMPLETE: CPT

## 2020-11-27 PROCEDURE — 36415 COLL VENOUS BLD VENIPUNCTURE: CPT | Performed by: INTERNAL MEDICINE

## 2020-11-27 PROCEDURE — 93005 ELECTROCARDIOGRAM TRACING: CPT | Performed by: INTERNAL MEDICINE

## 2020-11-27 RX ORDER — AMIODARONE HYDROCHLORIDE 200 MG/1
200 TABLET ORAL EVERY 12 HOURS SCHEDULED
Qty: 60 TABLET | Refills: 1 | Status: SHIPPED | OUTPATIENT
Start: 2020-11-27 | End: 2021-02-23

## 2020-11-27 RX ORDER — AMIODARONE HYDROCHLORIDE 200 MG/1
200 TABLET ORAL EVERY 12 HOURS SCHEDULED
Status: DISCONTINUED | OUTPATIENT
Start: 2020-11-27 | End: 2020-11-27 | Stop reason: HOSPADM

## 2020-11-27 RX ORDER — AMIODARONE HYDROCHLORIDE 200 MG/1
200 TABLET ORAL EVERY 12 HOURS SCHEDULED
Qty: 60 TABLET | Refills: 1 | Status: SHIPPED | OUTPATIENT
Start: 2020-11-27 | End: 2020-11-27

## 2020-11-27 RX ADMIN — PERFLUTREN 6 ML: 6.52 INJECTION, SUSPENSION INTRAVENOUS at 12:30

## 2020-11-27 RX ADMIN — APIXABAN 5 MG: 5 TABLET, FILM COATED ORAL at 08:53

## 2020-11-27 RX ADMIN — AMIODARONE HYDROCHLORIDE 400 MG: 200 TABLET ORAL at 08:53

## 2020-11-29 ENCOUNTER — HOSPITAL ENCOUNTER (INPATIENT)
Facility: HOSPITAL | Age: 80
LOS: 2 days | Discharge: HOME | End: 2020-12-01
Attending: EMERGENCY MEDICINE | Admitting: INTERNAL MEDICINE

## 2020-11-29 DIAGNOSIS — I48.91 ATRIAL FIBRILLATION WITH RAPID VENTRICULAR RESPONSE (HCC): Primary | ICD-10-CM

## 2020-11-29 LAB
ALBUMIN SERPL-MCNC: 4.1 G/DL (ref 3.5–5.2)
ALBUMIN/GLOB SERPL: 1.2 G/DL
ALP SERPL-CCNC: 82 U/L (ref 39–117)
ALT SERPL W P-5'-P-CCNC: 37 U/L (ref 1–41)
ANION GAP SERPL CALCULATED.3IONS-SCNC: 8.5 MMOL/L (ref 5–15)
APTT PPP: 38.6 SECONDS (ref 22.7–35.4)
AST SERPL-CCNC: 30 U/L (ref 1–40)
BASOPHILS # BLD AUTO: 0.03 10*3/MM3 (ref 0–0.2)
BASOPHILS NFR BLD AUTO: 0.8 % (ref 0–1.5)
BILIRUB SERPL-MCNC: 0.8 MG/DL (ref 0–1.2)
BUN SERPL-MCNC: 13 MG/DL (ref 8–23)
BUN/CREAT SERPL: 11.7 (ref 7–25)
CALCIUM SPEC-SCNC: 9.4 MG/DL (ref 8.6–10.5)
CHLORIDE SERPL-SCNC: 103 MMOL/L (ref 98–107)
CO2 SERPL-SCNC: 27.5 MMOL/L (ref 22–29)
CREAT SERPL-MCNC: 1.11 MG/DL (ref 0.76–1.27)
DEPRECATED RDW RBC AUTO: 52.1 FL (ref 37–54)
EOSINOPHIL # BLD AUTO: 0.11 10*3/MM3 (ref 0–0.4)
EOSINOPHIL NFR BLD AUTO: 2.8 % (ref 0.3–6.2)
ERYTHROCYTE [DISTWIDTH] IN BLOOD BY AUTOMATED COUNT: 13.2 % (ref 12.3–15.4)
GFR SERPL CREATININE-BSD FRML MDRD: 64 ML/MIN/1.73
GLOBULIN UR ELPH-MCNC: 3.3 GM/DL
GLUCOSE SERPL-MCNC: 117 MG/DL (ref 65–99)
HCT VFR BLD AUTO: 36.8 % (ref 37.5–51)
HGB BLD-MCNC: 12.2 G/DL (ref 13–17.7)
INR PPP: 1.2 (ref 0.9–1.1)
LYMPHOCYTES # BLD AUTO: 1.02 10*3/MM3 (ref 0.7–3.1)
LYMPHOCYTES NFR BLD AUTO: 26.2 % (ref 19.6–45.3)
MCH RBC QN AUTO: 35.2 PG (ref 26.6–33)
MCHC RBC AUTO-ENTMCNC: 33.2 G/DL (ref 31.5–35.7)
MCV RBC AUTO: 106.1 FL (ref 79–97)
MONOCYTES # BLD AUTO: 0.41 10*3/MM3 (ref 0.1–0.9)
MONOCYTES NFR BLD AUTO: 10.5 % (ref 5–12)
NEUTROPHILS NFR BLD AUTO: 2.31 10*3/MM3 (ref 1.7–7)
NEUTROPHILS NFR BLD AUTO: 59.4 % (ref 42.7–76)
PLATELET # BLD AUTO: 216 10*3/MM3 (ref 140–450)
PMV BLD AUTO: 9.7 FL (ref 6–12)
POTASSIUM SERPL-SCNC: 3.4 MMOL/L (ref 3.5–5.2)
PROT SERPL-MCNC: 7.4 G/DL (ref 6–8.5)
PROTHROMBIN TIME: 15 SECONDS (ref 11.7–14.2)
QT INTERVAL: 351 MS
RBC # BLD AUTO: 3.47 10*6/MM3 (ref 4.14–5.8)
SODIUM SERPL-SCNC: 139 MMOL/L (ref 136–145)
TROPONIN T SERPL-MCNC: 0.16 NG/ML (ref 0–0.03)
WBC # BLD AUTO: 3.89 10*3/MM3 (ref 3.4–10.8)

## 2020-11-29 PROCEDURE — 99285 EMERGENCY DEPT VISIT HI MDM: CPT

## 2020-11-29 PROCEDURE — 84484 ASSAY OF TROPONIN QUANT: CPT | Performed by: EMERGENCY MEDICINE

## 2020-11-29 PROCEDURE — 85730 THROMBOPLASTIN TIME PARTIAL: CPT | Performed by: EMERGENCY MEDICINE

## 2020-11-29 PROCEDURE — 93005 ELECTROCARDIOGRAM TRACING: CPT | Performed by: EMERGENCY MEDICINE

## 2020-11-29 PROCEDURE — 93010 ELECTROCARDIOGRAM REPORT: CPT | Performed by: INTERNAL MEDICINE

## 2020-11-29 PROCEDURE — 85610 PROTHROMBIN TIME: CPT | Performed by: EMERGENCY MEDICINE

## 2020-11-29 PROCEDURE — 85025 COMPLETE CBC W/AUTO DIFF WBC: CPT | Performed by: EMERGENCY MEDICINE

## 2020-11-29 PROCEDURE — 80053 COMPREHEN METABOLIC PANEL: CPT | Performed by: EMERGENCY MEDICINE

## 2020-11-29 PROCEDURE — 99284 EMERGENCY DEPT VISIT MOD MDM: CPT

## 2020-11-29 RX ORDER — SODIUM CHLORIDE 0.9 % (FLUSH) 0.9 %
10 SYRINGE (ML) INJECTION AS NEEDED
Status: DISCONTINUED | OUTPATIENT
Start: 2020-11-29 | End: 2020-12-01 | Stop reason: HOSPADM

## 2020-11-29 RX ORDER — LABETALOL HYDROCHLORIDE 5 MG/ML
10 INJECTION, SOLUTION INTRAVENOUS ONCE
Status: COMPLETED | OUTPATIENT
Start: 2020-11-29 | End: 2020-11-29

## 2020-11-29 RX ORDER — AMIODARONE HYDROCHLORIDE 200 MG/1
200 TABLET ORAL EVERY 12 HOURS SCHEDULED
Status: DISCONTINUED | OUTPATIENT
Start: 2020-11-29 | End: 2020-12-01 | Stop reason: HOSPADM

## 2020-11-29 RX ADMIN — APIXABAN 5 MG: 5 TABLET, FILM COATED ORAL at 21:43

## 2020-11-29 RX ADMIN — METOPROLOL TARTRATE 25 MG: 25 TABLET, FILM COATED ORAL at 02:41

## 2020-11-29 RX ADMIN — METOROPROLOL TARTRATE 5 MG: 5 INJECTION, SOLUTION INTRAVENOUS at 02:36

## 2020-11-29 RX ADMIN — LABETALOL HYDROCHLORIDE 10 MG: 5 INJECTION, SOLUTION INTRAVENOUS at 01:30

## 2020-11-29 RX ADMIN — APIXABAN 5 MG: 5 TABLET, FILM COATED ORAL at 14:51

## 2020-11-29 RX ADMIN — AMIODARONE HYDROCHLORIDE 200 MG: 200 TABLET ORAL at 14:51

## 2020-11-30 LAB — QT INTERVAL: 450 MS

## 2020-11-30 PROCEDURE — 93005 ELECTROCARDIOGRAM TRACING: CPT | Performed by: INTERNAL MEDICINE

## 2020-11-30 PROCEDURE — 99232 SBSQ HOSP IP/OBS MODERATE 35: CPT | Performed by: INTERNAL MEDICINE

## 2020-11-30 PROCEDURE — 99222 1ST HOSP IP/OBS MODERATE 55: CPT | Performed by: NURSE PRACTITIONER

## 2020-11-30 PROCEDURE — 93010 ELECTROCARDIOGRAM REPORT: CPT | Performed by: INTERNAL MEDICINE

## 2020-11-30 RX ORDER — LEVOTHYROXINE SODIUM 0.03 MG/1
25 TABLET ORAL DAILY
Status: DISCONTINUED | OUTPATIENT
Start: 2020-11-30 | End: 2020-12-01 | Stop reason: HOSPADM

## 2020-11-30 RX ORDER — POTASSIUM CHLORIDE 750 MG/1
40 TABLET, FILM COATED, EXTENDED RELEASE ORAL DAILY
Status: DISCONTINUED | OUTPATIENT
Start: 2020-11-30 | End: 2020-12-01 | Stop reason: HOSPADM

## 2020-11-30 RX ADMIN — AMIODARONE HYDROCHLORIDE 200 MG: 200 TABLET ORAL at 09:09

## 2020-11-30 RX ADMIN — APIXABAN 5 MG: 5 TABLET, FILM COATED ORAL at 20:22

## 2020-11-30 RX ADMIN — LEVOTHYROXINE SODIUM 25 MCG: 25 TABLET ORAL at 10:03

## 2020-11-30 RX ADMIN — AMIODARONE HYDROCHLORIDE 200 MG: 200 TABLET ORAL at 20:22

## 2020-11-30 RX ADMIN — POTASSIUM CHLORIDE 40 MEQ: 750 TABLET, EXTENDED RELEASE ORAL at 09:09

## 2020-11-30 RX ADMIN — APIXABAN 5 MG: 5 TABLET, FILM COATED ORAL at 09:09

## 2020-12-01 VITALS
OXYGEN SATURATION: 95 % | WEIGHT: 183.1 LBS | TEMPERATURE: 98 F | DIASTOLIC BLOOD PRESSURE: 69 MMHG | BODY MASS INDEX: 26.21 KG/M2 | HEART RATE: 60 BPM | SYSTOLIC BLOOD PRESSURE: 140 MMHG | HEIGHT: 70 IN | RESPIRATION RATE: 17 BRPM

## 2020-12-01 LAB
ANION GAP SERPL CALCULATED.3IONS-SCNC: 6.5 MMOL/L (ref 5–15)
BUN SERPL-MCNC: 18 MG/DL (ref 8–23)
BUN/CREAT SERPL: 16.1 (ref 7–25)
CALCIUM SPEC-SCNC: 8.8 MG/DL (ref 8.6–10.5)
CHLORIDE SERPL-SCNC: 106 MMOL/L (ref 98–107)
CO2 SERPL-SCNC: 25.5 MMOL/L (ref 22–29)
CREAT SERPL-MCNC: 1.12 MG/DL (ref 0.76–1.27)
GFR SERPL CREATININE-BSD FRML MDRD: 63 ML/MIN/1.73
GLUCOSE SERPL-MCNC: 98 MG/DL (ref 65–99)
POTASSIUM SERPL-SCNC: 4.3 MMOL/L (ref 3.5–5.2)
SODIUM SERPL-SCNC: 138 MMOL/L (ref 136–145)
T3FREE SERPL-MCNC: 2.46 PG/ML (ref 2–4.4)
T4 FREE SERPL-MCNC: 1.54 NG/DL (ref 0.93–1.7)
TSH SERPL DL<=0.05 MIU/L-ACNC: 4.97 UIU/ML (ref 0.27–4.2)

## 2020-12-01 PROCEDURE — 84481 FREE ASSAY (FT-3): CPT | Performed by: NURSE PRACTITIONER

## 2020-12-01 PROCEDURE — 84439 ASSAY OF FREE THYROXINE: CPT | Performed by: NURSE PRACTITIONER

## 2020-12-01 PROCEDURE — 99232 SBSQ HOSP IP/OBS MODERATE 35: CPT | Performed by: NURSE PRACTITIONER

## 2020-12-01 PROCEDURE — 84443 ASSAY THYROID STIM HORMONE: CPT | Performed by: NURSE PRACTITIONER

## 2020-12-01 PROCEDURE — 80048 BASIC METABOLIC PNL TOTAL CA: CPT | Performed by: INTERNAL MEDICINE

## 2020-12-01 RX ADMIN — APIXABAN 5 MG: 5 TABLET, FILM COATED ORAL at 08:48

## 2020-12-01 RX ADMIN — POTASSIUM CHLORIDE 40 MEQ: 750 TABLET, EXTENDED RELEASE ORAL at 08:48

## 2020-12-01 RX ADMIN — AMIODARONE HYDROCHLORIDE 200 MG: 200 TABLET ORAL at 08:48

## 2020-12-01 RX ADMIN — LEVOTHYROXINE SODIUM 25 MCG: 25 TABLET ORAL at 08:49

## 2020-12-01 NOTE — PLAN OF CARE
Goal Outcome Evaluation:           VSS. SB in 50s. RA. Up ad chai. Denies pain or SOA. Home today per cardio. Zio patch remains. CTM.

## 2020-12-01 NOTE — DISCHARGE SUMMARY
DISCHARGE NOTE    Patient Name: Quincy Roberts  Age/Sex: 80 y.o. male  : 1940  MRN: [id number]    Date of Discharge:  2020   Date of Admit: 2020  Encounter Provider: JANNY Naranjo  Place of Service: [facility] CARDIOLOGY  Patient Care Team:  Jose Valle MD as PCP - General  Jose Valle MD as Referring Physician (Family Medicine)  Adolph Martell MD as Consulting Physician (Hematology and Oncology)  Elmira Cabezas MD as Consulting Physician (Cardiology)    Subjective:     Discharge Diagnosis:    Atrial fibrillation with rapid ventricular response (CMS/HCC)      Hospital Course:     80 yr old male patient of Dr. Cabezas and Dr. Olivier. Had PVI/CTI on ablation on --has come to the hospital twice with episodes of PAF w/RVR---has been intolerant to BB in the past due to bradycardia---was started on amiodarone and has maintained SR since admission . Discussed in detail with patient that he may continue to have a few episodes in the next few weeks and as long as he feels okay --we discussed symptoms to watch for -he could just monitor at home. He as follow up appt with Dr. Olivier on ---will dc home on 200 mg amiodarone BID until his appt.     Vital Signs  Temp:  [98 °F (36.7 °C)-98.1 °F (36.7 °C)] 98 °F (36.7 °C)  Heart Rate:  [54-60] 60  Resp:  [16-17] 17  BP: (137-143)/(69-78) 140/69    Intake/Output Summary (Last 24 hours) at 2020 0828  Last data filed at 2020 0758  Gross per 24 hour   Intake 480 ml   Output 1000 ml   Net -520 ml       Physical Exam:    General Appearance: No acute distress, well developed and well nourished.   Eyes: Conjunctiva and lids: No erythema, swelling, or discharge. Sclera non-icteric.   HENT: Atraumatic, normocephalic. External eyes, ears, and nose normal.   Respiratory: No signs of  respiratory distress. Respiration rhythm and depth normal.   Clear to auscultation. No rales, crackles, rhonchi, or wheezing auscultated.   Cardiovascular:  Jugular Venous Pressure: Normal  Heart Rate and Rhythm: Normal, Heart Sounds: Normal S1 and S2. No S3 or S4 noted.  Murmurs: No murmurs noted. No rubs, thrills, or gallops.   Arterial Pulses: Posterior tibialis and dorsalis pedis pulses normal.   Lower Extremities: No edema noted.  Gastrointestinal:  Abdomen soft, non-distended, non-tender.  Musculoskeletal: Normal movement of extremities  Skin: Warm and dry.   Psychiatric: Patient alert and oriented to person, place, and time. Speech and behavior appropriate. Normal mood and affect.    Labs:   Results from last 7 days   Lab Units 12/01/20  0351 11/29/20 0129 11/27/20  0304 11/26/20  1515   SODIUM mmol/L 138 139 142 139   POTASSIUM mmol/L 4.3 3.4* 4.3 2.8*   CHLORIDE mmol/L 106 103 107 103   CO2 mmol/L 25.5 27.5 24.6 22.3   BUN mg/dL 18 13 10 8   CREATININE mg/dL 1.12 1.11 1.22 0.97   GLUCOSE mg/dL 98 117* 115* 137*   CALCIUM mg/dL 8.8 9.4 8.9 9.5   AST (SGOT) U/L  --  30  --  22   ALT (SGPT) U/L  --  37  --  20     Results from last 7 days   Lab Units 11/29/20  0129 11/26/20  1515   TROPONIN T ng/mL 0.156* 0.285*     Results from last 7 days   Lab Units 11/29/20  0129 11/26/20  1515   WBC 10*3/mm3 3.89 5.40   HEMOGLOBIN g/dL 12.2* 12.7*   HEMATOCRIT % 36.8* 36.9*   PLATELETS 10*3/mm3 216 178     Results from last 7 days   Lab Units 11/29/20  0129   INR  1.20*   APTT seconds 38.6*     Results from last 7 days   Lab Units 11/27/20  0304 11/26/20  1515   MAGNESIUM mg/dL 2.3 1.9         Results from last 7 days   Lab Units 11/26/20  1515   PROBNP pg/mL 2,209.0*           Discharge Diet:    Dietary Orders (From admission, onward)     Start     Ordered    11/30/20 0830  Diet Regular  Diet Effective Now     Question:  Diet Texture / Consistency  Answer:  Regular    11/30/20 0829                  Activity at  Discharge:  Instructions given to patient    Discharge Medications     Discharge Medications      Changes to Medications      Instructions Start Date   amLODIPine 10 MG tablet  Commonly known as: NORVASC  What changed:   · how much to take  · how to take this  · when to take this   TAKE 1 TABLET EVERY MORNING         Continue These Medications      Instructions Start Date   amiodarone 200 MG tablet  Commonly known as: PACERONE   200 mg, Oral, Every 12 Hours Scheduled      apixaban 5 MG tablet tablet  Commonly known as: ELIQUIS   5 mg, Oral, Every 12 Hours Scheduled      coenzyme Q10 50 MG capsule capsule   50 mg, Oral, Daily      doxazosin 4 MG tablet  Commonly known as: CARDURA   4 mg, Oral, Nightly      fish oil 1000 MG capsule capsule   1,000 mg, Oral, Daily With Breakfast, Omega Q plus      fluticasone 50 MCG/ACT nasal spray  Commonly known as: FLONASE   2 sprays, Nasal, Daily      levothyroxine 25 MCG tablet  Commonly known as: SYNTHROID, LEVOTHROID   25 mcg, Oral, Daily      nexIUM 40 MG capsule  Generic drug: esomeprazole   40 mg, Oral, Every Morning Before Breakfast      PROBIOTIC ACIDOPHILUS PO   1 tablet, Oral, Daily      TYLENOL PO   1 tablet, Oral, As Needed      VITAMIN D3 PO   1,000 mg, Oral, Daily             Discharge disposition: home    Follow-up Appointments  Follow-up Information     Jose Valle MD .    Specialty: Family Medicine  Contact information:  [address] [facility]  [address] [place], [place] [address] [phone]             Brandyn Olivier MD Follow up in 3 week(s).    Specialties: Cardiology, Cardiac Electrophysiology  Why: Has appt on 12/22  with Dr. Olivier  Contact information:  [address] [place] [address] [phone]                 Future Appointments   Date Time Provider Department Center   12/22/2020  9:45 AM Brandyn Olivier MD [facility] None   1/7/2021 10:20 AM Marco Barnard MD [facility]   1/12/2021  1:20 PM Elmira Cabezas  MD [facility] None         China Chew, JANNY  12/01/20  08:28 EST

## 2020-12-07 NOTE — DISCHARGE SUMMARY
Date of Admit: 11/26/2020    Date of Discharge: 12/27/2020    Discharge Diagnosis:  Problems Addressed this Visit        Cardiovascular and Mediastinum    Abnormal EKG      Other Visit Diagnoses     Tachy-sofia syndrome (CMS/HCC)    -  Primary    Atrial fibrillation, unspecified type (CMS/HCC)        Status post ablation of atrial fibrillation          Diagnoses       Codes Comments    Tachy-sofia syndrome (CMS/HCC)    -  Primary ICD-10-CM: I49.5  ICD-9-CM: 427.81     Abnormal EKG     ICD-10-CM: R94.31  ICD-9-CM: 794.31     Atrial fibrillation, unspecified type (CMS/HCC)     ICD-10-CM: I48.91  ICD-9-CM: 427.31     Status post ablation of atrial fibrillation     ICD-10-CM: Z98.890, Z86.79  ICD-9-CM: V45.89           Hospital Course:   1.  Paroxysmal atrial fibrillation with RVR s/p pulmonary vein ablation and CTI dependent flutter ablation on 11/23/2020  -Presented with recurrent atrial fibrillation with RVR.  Known not to tolerate AV rodo blockers.  He had a dose of 10 mg IV diltiazem in the ER noted to have pauses intermittently after that.  And heart rate is in the 110s.  His rhythm and out of A. fib/atypical flutter in sinus rhythm.  -he Stayed in sinus rhythm and he reports subjective improvement of symptoms     2.  Elevated troponin-suspect from tachycardia  Probably related to tachycardia     3.  Hypokalemia-resolved     4.  Hypertension-normal blood pressure overnight     Patient had echocardiogram done today and LVEF is within normal range with no regional wall motion abnormality.  Patient will be discharged home today on a Zio patch.  Follow-up with Dr White.       Procedures Performed         Consults     Date and Time Order Name Status Description    11/26/2020 1613 Cardiology (on-call MD unless specified) Completed           Condition on discharge-      Discharge Medications     Discharge Medications      New Medications      Instructions Start Date   amiodarone 200 MG tablet  Commonly known as:  PACERONE   200 mg, Oral, Every 12 Hours Scheduled         Changes to Medications      Instructions Start Date   amLODIPine 10 MG tablet  Commonly known as: NORVASC  What changed:   · how much to take  · how to take this  · when to take this   TAKE 1 TABLET EVERY MORNING         Continue These Medications      Instructions Start Date   apixaban 5 MG tablet tablet  Commonly known as: ELIQUIS   5 mg, Oral, Every 12 Hours Scheduled      coenzyme Q10 50 MG capsule capsule   50 mg, Oral, Daily      doxazosin 4 MG tablet  Commonly known as: CARDURA   4 mg, Oral, Nightly      fish oil 1000 MG capsule capsule   1,000 mg, Oral, Daily With Breakfast, Omega Q plus      fluticasone 50 MCG/ACT nasal spray  Commonly known as: FLONASE   2 sprays, Nasal, Daily      levothyroxine 25 MCG tablet  Commonly known as: SYNTHROID, LEVOTHROID   25 mcg, Oral, Daily      nexIUM 40 MG capsule  Generic drug: esomeprazole   40 mg, Oral, Every Morning Before Breakfast      PROBIOTIC ACIDOPHILUS PO   1 tablet, Oral, Daily      TYLENOL PO   1 tablet, Oral, As Needed      VITAMIN D3 PO   1,000 mg, Oral, Daily               Activity at Discharge:   Activity Instructions       Rest and relax               Follow-up Appointments  Additional Instructions for the Follow-ups that You Need to Schedule     Discharge Follow-up with Specified Provider: Dr. Abdoul Leslie; 1 Month   As directed      To: Dr. Abdoul Leslie    Follow Up: 1 Month           Follow-up Information     Jose Valle MD .    Specialty: Family Medicine  Contact information:  [address] [facility]  [address] [place] [id number]  [phone]                   Discharge diet      DISCHARGE DISPOSITION: Home      Abdoul Leslie MD  12/07/20  11:35 EST

## 2020-12-15 ENCOUNTER — TELEPHONE (OUTPATIENT)
Dept: CARDIOLOGY | Facility: CLINIC | Age: 80
End: 2020-12-15

## 2020-12-15 NOTE — TELEPHONE ENCOUNTER
Mr. Roberts called today because his HR seems to be decreasing since he was in the hospital at the end of November post aflutter ablation.He is complaining of feeling tired and shaky. No CP, SOA, diaphoresis, N/V, dizziness or feeling lightheaded.    His only recorded vitals have been:    12/5:  HR 61  12/9:  HR 58  12/11  HR 52  /60  12/15:  HR 46  /64  While on the phone with me:  HR 61  /62    He complains of feeling a little tired and his hands are shakey.    Cardiac meds:    Amiodarone 200mg BID  Cardura 4mg every PM  Amlodipine 10mg every AM    He has had his amiodarone and amlodipine today and took them at the same time as he took his HR of 46.    He has an appointment with you next week.    I told him since his HR is back up, if he feels up to it, he can go about his daily chores.  Instructed him to stop and sit for any CP, SOA, diziness or feeling lightheaded or other symptoms.    Thank you,  Anna Rick RN  [facility]  Triage

## 2020-12-16 NOTE — TELEPHONE ENCOUNTER
I checked on the patient this morning. He states his HR is still dropping down to the high 40s but he feels a little better today.  Continues to have some fatigue. He is going to keep a log of his BP and HR over the week until his appointment with you next week.    Thank you,  Anna Rick RN  [facility]  Triage

## 2020-12-18 ENCOUNTER — TELEPHONE (OUTPATIENT)
Dept: CARDIOLOGY | Facility: CLINIC | Age: 80
End: 2020-12-18

## 2020-12-18 NOTE — TELEPHONE ENCOUNTER
I spoke with Mr. Roberts regarding pauses. He states he has been feeling well.  He was instructed not to drive and to go to the emergency room if he became symptomatic. He verbalized understanding.  Monitor study was discussed with Dr. Diaz.      JANNY Mahmood

## 2020-12-19 PROCEDURE — 0298T HOLTER MONITOR - 72 HOUR UP TO 21 DAY: CPT | Performed by: INTERNAL MEDICINE

## 2020-12-21 NOTE — TELEPHONE ENCOUNTER
Called- unable to reach the pt will continue to try and reach him  Thanks  Zeina Johnson RN  Triage nurse

## 2020-12-21 NOTE — TELEPHONE ENCOUNTER
Pt is calling in this am following up on the call he received from the APRN yesterday.    He was told he had a pause.    He mentioned afib events for 2.5 hours on 12/4 and 12/6, shorter events on the 9th lasting only 3 minutes and stated his last afib event was 12/16.    I explained that was different than the pause we called about.  He said he is feeling good.     Sat 183/71 61  Sunday 144/62 hr 59    Please let me know how to proceed  Thanks  Zeina Johnson RN  Triage nurse

## 2020-12-23 ENCOUNTER — OFFICE VISIT (OUTPATIENT)
Dept: CARDIOLOGY | Facility: CLINIC | Age: 80
End: 2020-12-23

## 2020-12-23 VITALS
DIASTOLIC BLOOD PRESSURE: 72 MMHG | SYSTOLIC BLOOD PRESSURE: 150 MMHG | HEART RATE: 56 BPM | WEIGHT: 187.4 LBS | BODY MASS INDEX: 26.83 KG/M2 | HEIGHT: 70 IN

## 2020-12-23 DIAGNOSIS — I48.0 PAF (PAROXYSMAL ATRIAL FIBRILLATION) (HCC): Primary | ICD-10-CM

## 2020-12-23 PROCEDURE — 93000 ELECTROCARDIOGRAM COMPLETE: CPT | Performed by: INTERNAL MEDICINE

## 2020-12-23 PROCEDURE — 99213 OFFICE O/P EST LOW 20 MIN: CPT | Performed by: INTERNAL MEDICINE

## 2020-12-23 NOTE — PROGRESS NOTES
Date of Office Visit: 2020  Encounter Provider: Brandyn Olivier MD  Place of Service: [facility] CARDIOLOGY  Patient Name: Quincy Roberts  :1940    Chief Complaint   Patient presents with   • paroxysmal afib   :     HPI: Quincy Roberts is a 80 y.o. male who presents today for follow-up of paroxysmal atrial fibrillation.  He underwent ablation on .  Initially after ablation he had a lot of trouble with recurrent atrial fibrillation and had several visits to the hospital.  Due to the repeated episodes, we did start him on amiodarone.  He has charted the episodes since then and they have decreased in frequency with none for the past 2 weeks.  He is not having any trouble with anticoagulation.  No difficulties with amiodarone.          Past Medical History:   Diagnosis Date   • A-fib (CMS/HCC)    • Anemia    • Diverticulosis    • GERD (gastroesophageal reflux disease)    • Health care maintenance    • History of colon polyps    • History of jaundice     Childhood   • History of prostate cancer    • History of radiation therapy    • Hyperlipidemia    • Hypertension    • Kidney stones    • Osteoarthritis    • Prostate cancer (CMS/HCC)    • Pyogenic arthritis of left knee joint (CMS/HCC)        Past Surgical History:   Procedure Laterality Date   • CARDIAC CATHETERIZATION     • CARDIAC ELECTROPHYSIOLOGY PROCEDURE N/A 2020    Procedure: Ablation atrial fibrillation;  Surgeon: Brandyn Olivier MD;  Location: [facility] CATH INVASIVE LOCATION;  Service: Cardiovascular;  Laterality: N/A;   • COLONOSCOPY N/A 8/10/2018    Procedure: COLONOSCOPY INTO CECUM AND TERMINAL ILEUM;  Surgeon: Valentino Stern MD;  Location: [facility] ENDOSCOPY;  Service: Gastroenterology   • ENDOSCOPY N/A 2017    Procedure: ESOPHAGOGASTRODUODENOSCOPY WITH COLD BIOPSIES;  Surgeon: Valentino PEÑA MD;  Location: [facility] ENDOSCOPY;  Service:    • HERNIA REPAIR     • ROTATOR CUFF  REPAIR Left 2003   • TOTAL HIP ARTHROPLASTY Right 2011       Social History     Socioeconomic History   • Marital status:      Spouse name: Kassidy   • Number of children: Not on file   • Years of education: High School   • Highest education level: Not on file   Occupational History   • Occupation:      Employer: SELF-EMPLOYED   Tobacco Use   • Smoking status: Never Smoker   • Smokeless tobacco: Never Used   Substance and Sexual Activity   • Alcohol use: No     Comment: caffeine use: 2-3 cups daily   • Drug use: No   • Sexual activity: Defer       Family History   Problem Relation Age of Onset   • Breast cancer Mother 70   • Dementia Mother    • Malig Hyperthermia Neg Hx        Review of Systems   Constitution: Negative for malaise/fatigue.   HENT: Negative.    Eyes: Negative.    Cardiovascular: Negative for chest pain, dyspnea on exertion, leg swelling and near-syncope.   Respiratory: Negative for cough and shortness of breath.    Endocrine: Negative.    Hematologic/Lymphatic: Negative.    Skin: Negative.    Musculoskeletal: Negative.    Gastrointestinal: Negative.    Genitourinary: Negative.    Neurological: Negative.  Negative for weakness.   Psychiatric/Behavioral: Negative.    Allergic/Immunologic: Negative.        Allergies   Allergen Reactions   • Metoclopramide Anaphylaxis   • Carvedilol Unknown - Low Severity     Intolerant   • Hctz [Hydrochlorothiazide] Itching   • Bystolic [Nebivolol Hcl] Other (See Comments)     Bradycardia, fatigue, decreased energy    • Spironolactone Rash     Rash and breast tenderness         Current Outpatient Medications:   •  Acetaminophen (TYLENOL PO), Take 1 tablet by mouth As Needed., Disp: , Rfl:   •  amiodarone (PACERONE) 200 MG tablet, Take 1 tablet by mouth Every 12 (Twelve) Hours., Disp: 60 tablet, Rfl: 1  •  amLODIPine (NORVASC) 10 MG tablet, TAKE 1 TABLET EVERY MORNING (Patient taking differently: 5 mg.), Disp: 90 tablet, Rfl: 3  •  apixaban (ELIQUIS) 5  "MG tablet tablet, Take 1 tablet by mouth Every 12 (Twelve) Hours., Disp: 180 tablet, Rfl: 3  •  Cholecalciferol (VITAMIN D3 PO), Take 1,000 mg by mouth Daily., Disp: , Rfl:   •  coenzyme Q10 50 MG capsule capsule, Take 50 mg by mouth Daily., Disp: , Rfl:   •  doxazosin (CARDURA) 4 MG tablet, Take 1 tablet by mouth Every Night., Disp: 90 tablet, Rfl: 1  •  esomeprazole (NEXIUM) 40 MG capsule, Take 40 mg by mouth Every Morning Before Breakfast., Disp: , Rfl:   •  fluticasone (FLONASE) 50 MCG/ACT nasal spray, 2 sprays into the nostril(s) as directed by provider Daily., Disp: , Rfl:   •  Lactobacillus (PROBIOTIC ACIDOPHILUS PO), Take 1 tablet by mouth Daily., Disp: , Rfl:   •  levothyroxine (SYNTHROID, LEVOTHROID) 25 MCG tablet, Take 25 mcg by mouth Daily., Disp: , Rfl:   •  Omega-3 Fatty Acids (FISH OIL) 1000 MG capsule capsule, Take 1,000 mg by mouth Daily With Breakfast. Omega Q plus, Disp: , Rfl:       Objective:     Vitals:    12/23/20 1050   BP: 150/72   Pulse: 56   Weight: 85 kg (187 lb 6.4 oz)   Height: 177.8 cm (70\")     Body mass index is 26.89 kg/m².    PHYSICAL EXAM:    Vitals signs and nursing note reviewed.   Constitutional:       Appearance: Normal and healthy appearance.   HENT:      Head: Normocephalic.    Mouth/Throat:      Pharynx: Oropharynx is clear.   Pulmonary:      Effort: Pulmonary effort is normal.   Cardiovascular:      Normal rate. Regular rhythm.      Murmurs: There is no murmur.   Abdominal:      General: Abdomen is flat.   Skin:     General: Skin is warm.   Neurological:      General: No focal deficit present.      Mental Status: Alert, oriented to person, place, and time and oriented to person, place and time.   Psychiatric:         Attention and Perception: Attention and perception normal.         Mood and Affect: Mood and affect normal.         Speech: Speech normal.         Behavior: Behavior is cooperative.             ECG 12 Lead    Date/Time: 12/23/2020 4:57 PM  Performed by: " Brandyn Olivier MD  Authorized by: Brandyn Olivier MD   Comparison: compared with previous ECG from 11/30/2020  Similar to previous ECG  Rhythm: sinus rhythm  Other findings: non-specific ST-T wave changes    Clinical impression: non-specific ECG        I personally reviewed his Holter monitor tracings.  He had a few episodes of atrial fibrillation, lasting less than an hour, that do correlate with his charted episodes.  A couple of postconversion pauses up to about 6 seconds in duration are noted as well.  These were asymptomatic.      Assessment:       Diagnosis Plan   1. PAF (paroxysmal atrial fibrillation) (CMS/[facility])            Plan:       Continue amiodarone, and anticoagulation with Eliquis.  Encouraged to keep charting episodes.  Follow-up in 2 months at that time we will stop amiodarone and observe for recurrence of atrial fibrillation.  If he does have further episodes will consider repeat ablation.  He is not symptomatic with his pauses.  We will continue observation.    As always, it has been a pleasure to participate in your patient's care.      Sincerely,         Brandyn Olivier MD

## 2021-01-11 ENCOUNTER — OFFICE VISIT CONVERTED (OUTPATIENT)
Dept: FAMILY MEDICINE CLINIC | Age: 81
End: 2021-01-11
Attending: FAMILY MEDICINE

## 2021-01-12 ENCOUNTER — OFFICE VISIT (OUTPATIENT)
Dept: CARDIOLOGY | Facility: CLINIC | Age: 81
End: 2021-01-12

## 2021-01-12 ENCOUNTER — HOSPITAL ENCOUNTER (OUTPATIENT)
Dept: OTHER | Facility: HOSPITAL | Age: 81
Discharge: HOME | End: 2021-01-12
Attending: FAMILY MEDICINE

## 2021-01-12 VITALS
HEIGHT: 70 IN | HEART RATE: 54 BPM | DIASTOLIC BLOOD PRESSURE: 96 MMHG | WEIGHT: 188 LBS | SYSTOLIC BLOOD PRESSURE: 194 MMHG | BODY MASS INDEX: 26.92 KG/M2

## 2021-01-12 DIAGNOSIS — E78.2 MIXED HYPERLIPIDEMIA: ICD-10-CM

## 2021-01-12 DIAGNOSIS — I10 ESSENTIAL HYPERTENSION: ICD-10-CM

## 2021-01-12 DIAGNOSIS — I48.0 PAF (PAROXYSMAL ATRIAL FIBRILLATION) (HCC): Primary | ICD-10-CM

## 2021-01-12 LAB
ALBUMIN SERPL-MCNC: 4.5 G/DL (ref 3.5–5)
ALBUMIN/GLOB SERPL: 1.7 {RATIO} (ref 1.4–2.6)
ALP SERPL-CCNC: 74 U/L (ref 56–155)
ALT SERPL-CCNC: 17 U/L (ref 10–40)
ANION GAP SERPL CALC-SCNC: 17 MMOL/L (ref 8–19)
AST SERPL-CCNC: 17 U/L (ref 15–50)
BILIRUB SERPL-MCNC: 0.84 MG/DL (ref 0.2–1.3)
BUN SERPL-MCNC: 9 MG/DL (ref 5–25)
BUN/CREAT SERPL: 7 {RATIO} (ref 6–20)
CALCIUM SERPL-MCNC: 9.4 MG/DL (ref 8.7–10.4)
CHLORIDE SERPL-SCNC: 102 MMOL/L (ref 99–111)
CHOLEST SERPL-MCNC: 114 MG/DL (ref 107–200)
CHOLEST/HDLC SERPL: 2.4 {RATIO} (ref 3–6)
CONV CO2: 24 MMOL/L (ref 22–32)
CONV TOTAL PROTEIN: 7.1 G/DL (ref 6.3–8.2)
CREAT UR-MCNC: 1.3 MG/DL (ref 0.7–1.2)
GFR SERPLBLD BASED ON 1.73 SQ M-ARVRAT: 51 ML/MIN/{1.73_M2}
GLOBULIN UR ELPH-MCNC: 2.6 G/DL (ref 2–3.5)
GLUCOSE SERPL-MCNC: 129 MG/DL (ref 70–99)
HDLC SERPL-MCNC: 47 MG/DL (ref 40–60)
LDLC SERPL CALC-MCNC: 53 MG/DL (ref 70–100)
MAGNESIUM SERPL-MCNC: 2.06 MG/DL (ref 1.6–2.3)
OSMOLALITY SERPL CALC.SUM OF ELEC: 288 MOSM/KG (ref 273–304)
POTASSIUM SERPL-SCNC: 3.6 MMOL/L (ref 3.5–5.3)
PSA SERPL-MCNC: 0.5 NG/ML (ref 0–4)
SODIUM SERPL-SCNC: 139 MMOL/L (ref 135–147)
T4 FREE SERPL-MCNC: 1.6 NG/DL (ref 0.9–1.8)
TRIGL SERPL-MCNC: 72 MG/DL (ref 40–150)
TSH SERPL-ACNC: 7.54 M[IU]/L (ref 0.27–4.2)
VLDLC SERPL-MCNC: 14 MG/DL (ref 5–37)

## 2021-01-12 PROCEDURE — 99214 OFFICE O/P EST MOD 30 MIN: CPT | Performed by: INTERNAL MEDICINE

## 2021-01-12 PROCEDURE — 93000 ELECTROCARDIOGRAM COMPLETE: CPT | Performed by: INTERNAL MEDICINE

## 2021-01-12 NOTE — PROGRESS NOTES
Date of Office Visit: 2021  Encounter Provider: Elmira Cabezas MD  Place of Service: [facility] CARDIOLOGY  Patient Name: Quincy Roberts  :1940      Patient ID:  Quincy Roberts is a 80 y.o. male is here for  followup for PAF.        History of Present Illness    I first saw in the chest pain unit at  [facility], [place], operated by [facility].  He presented there with exertional chest pain, short-windedness,  and fatigue.  He went to the emergency department and his blood pressure was very high at  about 200/120.  He had not had that issue in quite some time.  Because of his chest pain,  we did an ischemic workup.  He ruled out for a myocardial infarction and had a stress  nuclear perfusion study done on January 10, 2013, which showed no ischemia.  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  During his hospitalization, he also had a lipid panel done on  January 10, 2013, which showed triglycerides of 59, HDL of 31, LDL of 82, and total  cholesterol of 125.     He did have some left upper quadrant pain and for  that he saw Dr. Stern.  He subsequently had an EGD.  He also had a CT of his abdomen  and pelvis.  The EGD looked fine.  The CT of the abdomen and pelvis suggested diverticular  disease.  He then had a colonoscopy performed and 7 polyps were removed, and I think they  have been treating him for the diverticular disease. His last EGD was 2017 and was normal.         He was diagnosed with prostate cancer in 2015. He sees Dr. Garcia  at [facility]. He underwent radiation therapy for that and it has helped, not only the  prostatic hypertrophy, but the prostate cancer.      In 2019, he was hospitalized for left knee swelling and found to have a large left knee effusion.  He was treated with IV vancomycin and underwent left knee arthrocentesis with 110 mL bloody synovial fluid that was  removed.     He was unable to tolerate the hydrochlorothiazide due to itching so it was discontinued.       On 12/3/2019, he came to our office for a blood pressure check and manually it was 154/70 and 168/72.  His blood pressure machine ran much higher at 183/73.  I recommended that he follow a low-sodium diet and increased his lisinopril to 40 mg daily.  He had a follow-up BMP on 1/3/2020 which showed normal kidney function and potassium.     Echo done 2/27/2020 showed ejection fraction 61% with borderline dilated left atrium, normal valvular structure and function.  Laboratory values in 2/14/2020 show slightly elevated TSH with normal total T4 and normal T uptake.  He had telemedicine visit with Fraida Chiu 4/6/2020 and at that time he was complaining of decreased energy on Bystolic.  His heart rate is running 46-42.  He then stopped the Bystolic     He has a history of symptomatic paroxysmal atrial fibrillation with rapid ventricular response but bradycardia as well.    Echocardiogram 11/27/2020 shows ejection fraction 65% with normal saline contrast study and mild tricuspid insufficiency.  Diastolic function is normal.     He continued to have intermittent heart racing as well as slowing.  This was felt to be due to paroxysmal atrial fibrillation with rapid ventricular response. He underwent pulmonary vein isolation and ablation for cavotricuspid isthmus dependent atrial flutter on 11/23/2020.  He then represented to the hospital 11/29/2020 with palpitations.  He was placed on amiodarone for intermittent atrial fibrillation and dismissed to home.  He saw Dr. Olivier in follow-up on 12/23/2020 who recommended continuation of amiodarone and Eliquis.  He plans to see him back in 2 months and stop amiodarone at that time.  If he has recurrent episodes after the discontinuation of amiodarone, he may need repeat ablation per Dr. Olivier.    He is overall doing well on the amiodarone and has not noticed any atrial  fibrillation for several weeks.  He is about to get laboratory values done with his primary care physician in [place].  He has noticed his blood pressure still remains high he checks it at home.  It runs anywhere from 140-160 over 70s.  His heart rates been running 50s to 60s.  He is had no dizziness or syncope.  He has no chest pain or pressure.  He has no difficulty breathing.  He is having no difficulty taking his medications.  He takes amlodipine 5 mg in the morning and doxazosin 4 mg nightly.    Past Medical History:   Diagnosis Date   • A-fib (CMS/[facility])    • Anemia    • Diverticulosis    • GERD (gastroesophageal reflux disease)    • Health care maintenance    • History of colon polyps    • History of jaundice     Childhood   • History of prostate cancer 2014   • History of radiation therapy    • Hyperlipidemia    • Hypertension    • Kidney stones    • Osteoarthritis    • Prostate cancer (CMS/[facility]) 2014   • Pyogenic arthritis of left knee joint (CMS/[facility])          Past Surgical History:   Procedure Laterality Date   • CARDIAC CATHETERIZATION     • CARDIAC ELECTROPHYSIOLOGY PROCEDURE N/A 11/23/2020    Procedure: Ablation atrial fibrillation;  Surgeon: Brandyn Olivier MD;  Location: [facility] CATH INVASIVE LOCATION;  Service: Cardiovascular;  Laterality: N/A;   • COLONOSCOPY N/A 8/10/2018    Procedure: COLONOSCOPY INTO CECUM AND TERMINAL ILEUM;  Surgeon: Valentino Stern MD;  Location: [facility] ENDOSCOPY;  Service: Gastroenterology   • ENDOSCOPY N/A 8/2/2017    Procedure: ESOPHAGOGASTRODUODENOSCOPY WITH COLD BIOPSIES;  Surgeon: Valentino PEÑA MD;  Location: [facility] ENDOSCOPY;  Service:    • HERNIA REPAIR  2010   • ROTATOR CUFF REPAIR Left 2003   • TOTAL HIP ARTHROPLASTY Right 2011       Current Outpatient Medications on File Prior to Visit   Medication Sig Dispense Refill   • Acetaminophen (TYLENOL PO) Take 1 tablet by mouth As Needed.     • amiodarone (PACERONE) 200 MG tablet Take 1 tablet by mouth  Every 12 (Twelve) Hours. 60 tablet 1   • amLODIPine (NORVASC) 10 MG tablet TAKE 1 TABLET EVERY MORNING (Patient taking differently: 5 mg.) 90 tablet 3   • apixaban (ELIQUIS) 5 MG tablet tablet Take 1 tablet by mouth Every 12 (Twelve) Hours. 180 tablet 3   • Cholecalciferol (VITAMIN D3 PO) Take 1,000 mg by mouth Daily.     • coenzyme Q10 50 MG capsule capsule Take 50 mg by mouth Daily.     • doxazosin (CARDURA) 4 MG tablet Take 1 tablet by mouth Every Night. 90 tablet 1   • esomeprazole (NEXIUM) 40 MG capsule Take 40 mg by mouth Every Morning Before Breakfast.     • fluticasone (FLONASE) 50 MCG/ACT nasal spray 2 sprays into the nostril(s) as directed by provider Daily.     • Lactobacillus (PROBIOTIC ACIDOPHILUS PO) Take 1 tablet by mouth Daily.     • levothyroxine (SYNTHROID, LEVOTHROID) 25 MCG tablet Take 25 mcg by mouth Daily.     • Omega-3 Fatty Acids (FISH OIL) 1000 MG capsule capsule Take 1,000 mg by mouth Daily With Breakfast. Omega Q plus       No current facility-administered medications on file prior to visit.        Social History     Socioeconomic History   • Marital status:      Spouse name: Kassidy   • Number of children: Not on file   • Years of education: High School   • Highest education level: Not on file   Occupational History   • Occupation:      Employer: SELF-EMPLOYED   Tobacco Use   • Smoking status: Never Smoker   • Smokeless tobacco: Never Used   Substance and Sexual Activity   • Alcohol use: No     Comment: caffeine use: 2-3 cups daily: decaf    • Drug use: No   • Sexual activity: Defer           ROS    Procedures    ECG 12 Lead    Date/Time: 1/12/2021 2:18 PM  Performed by: Elmira Cabezas MD  Authorized by: Elmira Cabezas MD   Comparison: compared with previous ECG   Similar to previous ECG  Rhythm: sinus rhythm    Clinical impression: normal ECG                Objective:      Vitals:    01/12/21 1339   BP: (!) 194/96   Pulse: 54   Weight: 85.3 kg (188 lb)  "  Height: 177.8 cm (70\")     Body mass index is 26.98 kg/m².    Vitals signs reviewed.   Constitutional:       General: Not in acute distress.     Appearance: Well-developed. Not diaphoretic.   Eyes:      General: No scleral icterus.     Conjunctiva/sclera: Conjunctivae normal.   HENT:      Head: Normocephalic and atraumatic.   Neck:      Musculoskeletal: Neck supple.      Thyroid: No thyromegaly.      Vascular: No carotid bruit or JVD.      Lymphadenopathy: No cervical adenopathy.   Pulmonary:      Effort: Pulmonary effort is normal. No respiratory distress.      Breath sounds: Normal breath sounds. No wheezing. No rhonchi. No rales.   Chest:      Chest wall: Not tender to palpatation.   Cardiovascular:      Normal rate. Regular rhythm.      No gallop.   Pulses:     Intact distal pulses.   Edema:     Peripheral edema absent.   Abdominal:      General: Bowel sounds are normal. There is no distension or abdominal bruit.      Palpations: Abdomen is soft. There is no abdominal mass.      Tenderness: There is no abdominal tenderness.   Musculoskeletal:         General: No deformity.      Extremities: No clubbing present.  Skin:     General: Skin is warm and dry. There is no cyanosis.      Coloration: Skin is not pale.      Findings: No rash.   Neurological:      Mental Status: Alert and oriented to person, place, and time.      Cranial Nerves: No cranial nerve deficit.   Psychiatric:         Judgment: Judgment normal.         Lab Review:       Assessment:      Diagnosis Plan   1. PAF (paroxysmal atrial fibrillation) (CMS/HCC)     2. Essential hypertension     3. Mixed hyperlipidemia       1. Non-cardiac chest pain. Normal stress nuclear perfusion study done January 2013.  2. Hypertension, uncontrolled. Goal 120/80.   3. History of renal cyst, stable.  4. Hyperlipidemia in the form of low HDL.   5. 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   6. Stage 1 prostate cancer 2/2015, s/p radiation with " Dr. Burns.  7. PAF, with RVR.  3 episodes since 9/10/2020.  On apixaban 5 mg twice daily.  s/p ablation 11/23/20. Still intermittent atrial fibrillation after ablation, placed on amiodarone for this.           Plan:       Decrease amiodarone to 200 mg in the morning and increase doxazosin to 6 mg nightly for blood pressure.  He is rarely having any atrial fibrillation.  I advised him to keep his appointment in April with Dr. Olivier.  He is going to see Yi Ware at the end of February and hopefully discontinue his amiodarone at that point.  Also be a good time to recheck his blood pressure as he might need the addition of an ACE or an ARB.  Await his laboratory values to be done in [place].  Added a CBC to his other panel which include lipids, CMP and TSH.

## 2021-01-13 LAB
BASOPHILS # BLD MANUAL: 0.05 10*3/UL (ref 0–0.2)
BASOPHILS NFR BLD MANUAL: 1.2 % (ref 0–3)
DEPRECATED RDW RBC AUTO: 54.7 FL
EOSINOPHIL # BLD MANUAL: 0.05 10*3/UL (ref 0–0.7)
EOSINOPHIL NFR BLD MANUAL: 1.2 % (ref 0–7)
ERYTHROCYTE [DISTWIDTH] IN BLOOD BY AUTOMATED COUNT: 14.4 % (ref 11.5–14.5)
GRANS (ABSOLUTE): 2.58 10*3/UL (ref 2–8)
GRANS: 63 % (ref 30–85)
HBA1C MFR BLD: 11.7 G/DL (ref 14–18)
HCT VFR BLD AUTO: 34.3 % (ref 42–52)
IMM GRANULOCYTES # BLD: 0 10*3/UL (ref 0–0.54)
IMM GRANULOCYTES NFR BLD: 0 % (ref 0–0.43)
LYMPHOCYTES # BLD MANUAL: 1.09 10*3/UL (ref 1–5)
LYMPHOCYTES NFR BLD MANUAL: 8 % (ref 3–10)
MCH RBC QN AUTO: 36 PG (ref 27–31)
MCHC RBC AUTO-ENTMCNC: 34.1 G/DL (ref 33–37)
MCV RBC AUTO: 105.5 FL (ref 80–96)
MONOCYTES # BLD AUTO: 0.33 10*3/UL (ref 0.2–1.2)
PLATELET # BLD AUTO: 195 10*3/UL (ref 130–400)
PMV BLD AUTO: 10.5 FL (ref 7.4–10.4)
RBC # BLD AUTO: 3.25 10*6/UL (ref 4.7–6.1)
VARIANT LYMPHS NFR BLD MANUAL: 26.6 % (ref 20–45)
WBC # BLD AUTO: 4.1 10*3/UL (ref 4.8–10.8)

## 2021-01-21 ENCOUNTER — TELEPHONE (OUTPATIENT)
Dept: CARDIOLOGY | Facility: CLINIC | Age: 81
End: 2021-01-21

## 2021-01-21 RX ORDER — LOSARTAN POTASSIUM 25 MG/1
25 TABLET ORAL DAILY
Qty: 90 TABLET | Refills: 3 | Status: SHIPPED | OUTPATIENT
Start: 2021-01-21 | End: 2021-01-22 | Stop reason: SDUPTHER

## 2021-01-21 NOTE — TELEPHONE ENCOUNTER
Plan:       Decrease amiodarone to 200 mg in the morning and increase doxazosin to 6 mg nightly for blood pressure.  He is rarely having any atrial fibrillation.  I advised him to keep his appointment in April with Dr. Olivier.  He is going to see Yi Ware at the end of February and hopefully discontinue his amiodarone at that point.  Also be a good time to recheck his blood pressure as he might need the addition of an ACE or an ARB.  Await his laboratory values to be done in [place].  Added a CBC to his other panel which include lipids, CMP and TSH.      Pt called stating that since the increase in his Doxazosin to 6 mg his blood pressure is averaging 160/67 with a heart rate of 53

## 2021-01-22 RX ORDER — LOSARTAN POTASSIUM 25 MG/1
25 TABLET ORAL DAILY
Qty: 90 TABLET | Refills: 3 | Status: SHIPPED | OUTPATIENT
Start: 2021-01-22 | End: 2021-02-23 | Stop reason: SDUPTHER

## 2021-01-27 ENCOUNTER — TELEPHONE (OUTPATIENT)
Dept: CARDIOLOGY | Facility: CLINIC | Age: 81
End: 2021-01-27

## 2021-01-27 NOTE — TELEPHONE ENCOUNTER
Pt notified and verbalized understanding    I have suggested he try to elevated his legs, ware compression socks, and restrict his salt intake  Thanks  Zeina Johnson RN  Triage nurse

## 2021-01-27 NOTE — TELEPHONE ENCOUNTER
Dr Mon,  Pt is calling in to let you know that his bp is still elevated since his last medication adjustment.  He is also experiencing foot and ankle edema, but no SOA.    1//67 53  1//67 48  1/27-left 183/70 rt 193/74 and 1 hour after meds 173/78    He is not weighing daily, he said his base wt is usually 185-188, but he has lost wt and was 178 3 days ago.    Cardiac meds reviewed  Losartan 25mg daily  Amiodarone 200mg daily  Doxazosin 6mg daily (prostate)  Amlodipine 5mg daily    Please advise on how to proceed.  Thanks  Zeina Johnson RN  Triage nurse

## 2021-02-23 ENCOUNTER — TELEPHONE (OUTPATIENT)
Dept: CARDIOLOGY | Facility: CLINIC | Age: 81
End: 2021-02-23

## 2021-02-23 ENCOUNTER — OFFICE VISIT (OUTPATIENT)
Dept: CARDIOLOGY | Facility: CLINIC | Age: 81
End: 2021-02-23

## 2021-02-23 VITALS
SYSTOLIC BLOOD PRESSURE: 172 MMHG | HEIGHT: 70 IN | HEART RATE: 54 BPM | WEIGHT: 191 LBS | DIASTOLIC BLOOD PRESSURE: 80 MMHG | BODY MASS INDEX: 27.35 KG/M2

## 2021-02-23 DIAGNOSIS — E78.2 MIXED HYPERLIPIDEMIA: ICD-10-CM

## 2021-02-23 DIAGNOSIS — I10 ESSENTIAL HYPERTENSION: Primary | ICD-10-CM

## 2021-02-23 DIAGNOSIS — R00.1 SINUS BRADYCARDIA: ICD-10-CM

## 2021-02-23 DIAGNOSIS — I48.0 PAF (PAROXYSMAL ATRIAL FIBRILLATION) (HCC): ICD-10-CM

## 2021-02-23 DIAGNOSIS — R01.1 HEART MURMUR: ICD-10-CM

## 2021-02-23 DIAGNOSIS — Q24.5 CORONARY-MYOCARDIAL BRIDGE: ICD-10-CM

## 2021-02-23 PROCEDURE — 93000 ELECTROCARDIOGRAM COMPLETE: CPT | Performed by: NURSE PRACTITIONER

## 2021-02-23 PROCEDURE — 99214 OFFICE O/P EST MOD 30 MIN: CPT | Performed by: NURSE PRACTITIONER

## 2021-02-23 RX ORDER — LOSARTAN POTASSIUM 50 MG/1
50 TABLET ORAL 2 TIMES DAILY
Qty: 180 TABLET | Refills: 3 | Status: SHIPPED | OUTPATIENT
Start: 2021-02-23 | End: 2022-02-14

## 2021-02-23 RX ORDER — DOXAZOSIN MESYLATE 4 MG/1
6 TABLET ORAL NIGHTLY
Qty: 135 TABLET | Refills: 3 | Status: SHIPPED | OUTPATIENT
Start: 2021-02-23 | End: 2022-01-05

## 2021-02-23 RX ORDER — AMLODIPINE BESYLATE 5 MG/1
5 TABLET ORAL DAILY
Qty: 90 TABLET | Refills: 3 | Status: SHIPPED | OUTPATIENT
Start: 2021-02-23 | End: 2021-11-17 | Stop reason: DRUGHIGH

## 2021-02-23 NOTE — TELEPHONE ENCOUNTER
Called patient and discussed taking his losartan twice a day at 50 mg. He was agreeable. I sent 50 mg tablets to the [facility] pharmacy in [place] for him per his request. He was taking his 25 mg tablets and was going to run out.     I also refilled his amlodipine and doxazosin at his mail order pharmacy for him per his request.

## 2021-02-23 NOTE — PROGRESS NOTES
Date of Office Visit: 2021  Encounter Provider: JANNY Li  Place of Service: [facility] CARDIOLOGY  Patient Name: Quincy Roberts  :1940      Patient ID:  Quincy Roberts is a 80 y.o. male is here for follow up for hypertension, and atrial fibrillation.      History of Present Illness    He is a new patient to me and I have reviewed his previous records, labs, testing, and previous office notes.    He was first seen in the chest pain unit at [facility].  He presented there with exertional chest pain, short-windedness, and fatigue.  He went to the emergency department and his blood pressure was very high at about 200/120.  He had not had that issue in quite some time.  Because of his chest pain, we did an ischemic workup.  He ruled out for a myocardial infarction and had a stress nuclear perfusion study done on January 10, 2013, which showed no ischemia.  He previously had a cardiac catheterization done in 2002 which showed myocardial bridging but no significant stenosis.  During his hospitalization, he also had a lipid panel done on January 10, 2013, which showed triglycerides of 59, HDL of 31, LDL of 82, and total cholesterol of 125.     He did have some left upper quadrant pain and for that he saw Dr. Stern.  He subsequently had an EGD.  He also had a CT of his abdomen and pelvis.  The EGD looked fine.  The CT of the abdomen and pelvis suggested diverticular disease.  He then had a colonoscopy performed and 7 polyps were removed, and I think they have been treating him for the diverticular disease. His last EGD was 2017 and was normal.         He was diagnosed with prostate cancer in 2015. He sees Dr. Garcia at [facility]. He underwent radiation therapy for that and it has helped, not only the prostatic hypertrophy, but the prostate cancer.      In 2019, he was hospitalized for left knee swelling and found to have a large  left knee effusion.  He was treated with IV vancomycin and underwent left knee arthrocentesis with 110 mL bloody synovial fluid that was removed.     He was unable to tolerate the hydrochlorothiazide due to itching so it was discontinued.       On 12/3/2019, he came to our office for a blood pressure check and manually it was 154/70 and 168/72.  His blood pressure machine ran much higher at 183/73.  Dr. Cabezas recommended that he follow a low-sodium diet and increased his lisinopril to 40 mg daily.  He had a follow-up BMP on 1/3/2020 which showed normal kidney function and potassium.     Echo done 2/27/2020 showed ejection fraction 61% with borderline dilated left atrium, normal valvular structure and function.  Laboratory values in 2/14/2020 show slightly elevated TSH with normal total T4 and normal T uptake.  He had telemedicine visit with Farida Chiu 4/6/2020 and at that time he was complaining of decreased energy on Bystolic.  His heart rate is running 46-42.  He then stopped the Bystolic     He has a history of symptomatic paroxysmal atrial fibrillation with rapid ventricular response but bradycardia as well.     Echocardiogram 11/27/2020 shows ejection fraction 65% with normal saline contrast study and mild tricuspid insufficiency.  Diastolic function is normal.     He continued to have intermittent heart racing as well as slowing.  This was felt to be due to paroxysmal atrial fibrillation with rapid ventricular response. He underwent pulmonary vein isolation and ablation for cavotricuspid isthmus dependent atrial flutter on 11/23/2020.  He then represented to the hospital 11/29/2020 with palpitations.  He was placed on amiodarone for intermittent atrial fibrillation and dismissed to home.  He saw Dr. Olivier in follow-up on 12/23/2020 who recommended continuation of amiodarone and Eliquis.  He plans to see him back in 2 months and stop amiodarone at that time.  If he has recurrent episodes after the  discontinuation of amiodarone, he may need repeat ablation per Dr. Olivier.    He saw Dr. Cabezas in January. He was not having any atrial fibrillation for several weeks and we decreased his him amiodarone to 200 mg once a day. He was having some issues with hypertension so we increased his amlodipine to 10 mg. He he called the office and stated that he started having trouble with swelling in his feet and his blood pressure was still uncontrolled. Dr. Cabezas asked him to increase his losartan to 50 mg a day, decrease his Norvasc to 5 mg a day, and increase his doxazosin to 6 mg a day.      Overall, he is doing well today. He is still having some issues at home with his blood pressure. He is consistently running 170s/60-70s and his heart rate in the 60s and 70s. He has some lower extremity edema pitting 2+ in bilateral feet and ankles. He denies any shortness of breath, orthopnea, or PND. He is not having any chest pain. He does not have any dizziness, syncope or near syncope. He has not had any atrial fibrillation.     Past Medical History:   Diagnosis Date   • A-fib (CMS/HCC)    • Anemia    • Diverticulosis    • GERD (gastroesophageal reflux disease)    • Health care maintenance    • History of colon polyps    • History of jaundice     Childhood   • History of prostate cancer 2014   • History of radiation therapy    • Hyperlipidemia    • Hypertension    • Kidney stones    • Osteoarthritis    • Prostate cancer (CMS/HCC) 2014   • Pyogenic arthritis of left knee joint (CMS/HCC)          Past Surgical History:   Procedure Laterality Date   • CARDIAC CATHETERIZATION     • CARDIAC ELECTROPHYSIOLOGY PROCEDURE N/A 11/23/2020    Procedure: Ablation atrial fibrillation;  Surgeon: Brandyn Olivier MD;  Location: [facility] INVASIVE LOCATION;  Service: Cardiovascular;  Laterality: N/A;   • COLONOSCOPY N/A 8/10/2018    Procedure: COLONOSCOPY INTO CECUM AND TERMINAL ILEUM;  Surgeon: Valentino Stern MD;   Location: [facility] ENDOSCOPY;  Service: Gastroenterology   • ENDOSCOPY N/A 8/2/2017    Procedure: ESOPHAGOGASTRODUODENOSCOPY WITH COLD BIOPSIES;  Surgeon: Valentino PEÑA MD;  Location: [facility] ENDOSCOPY;  Service:    • HERNIA REPAIR  2010   • ROTATOR CUFF REPAIR Left 2003   • TOTAL HIP ARTHROPLASTY Right 2011       Current Outpatient Medications on File Prior to Visit   Medication Sig Dispense Refill   • Acetaminophen (TYLENOL PO) Take 1 tablet by mouth As Needed.     • amiodarone (PACERONE) 200 MG tablet Take 1 tablet by mouth Every 12 (Twelve) Hours. (Patient taking differently: Take 200 mg by mouth Daily.) 60 tablet 1   • amLODIPine (NORVASC) 10 MG tablet TAKE 1 TABLET EVERY MORNING (Patient taking differently: Take 5 mg by mouth Daily.) 90 tablet 3   • apixaban (ELIQUIS) 5 MG tablet tablet Take 1 tablet by mouth Every 12 (Twelve) Hours. 180 tablet 3   • Cholecalciferol (VITAMIN D3 PO) Take 1,000 mg by mouth Daily.     • coenzyme Q10 50 MG capsule capsule Take 50 mg by mouth Daily.     • doxazosin (CARDURA) 4 MG tablet Take 1 tablet by mouth Every Night. (Patient taking differently: Take 6 mg by mouth Every Night.) 90 tablet 1   • esomeprazole (NEXIUM) 40 MG capsule Take 40 mg by mouth Every Morning Before Breakfast.     • fluticasone (FLONASE) 50 MCG/ACT nasal spray 2 sprays into the nostril(s) as directed by provider As Needed.     • Lactobacillus (PROBIOTIC ACIDOPHILUS PO) Take 1 tablet by mouth Daily.     • levothyroxine (SYNTHROID, LEVOTHROID) 50 MCG tablet Take 50 mcg by mouth Daily.     • losartan (Cozaar) 25 MG tablet Take 1 tablet by mouth Daily. (Patient taking differently: Take 50 mg by mouth Daily.) 90 tablet 3   • Omega-3 Fatty Acids (FISH OIL) 1000 MG capsule capsule Take 1,000 mg by mouth Daily With Breakfast. Omega Q plus     • Probiotic Product (PROBIOTIC PO) Take 1 tablet by mouth Daily.       No current facility-administered medications on file prior to visit.        Social History  "    Socioeconomic History   • Marital status:      Spouse name: Kassidy   • Number of children: Not on file   • Years of education: High School   • Highest education level: Not on file   Occupational History   • Occupation:      Employer: SELF-EMPLOYED   Tobacco Use   • Smoking status: Never Smoker   • Smokeless tobacco: Never Used   Substance and Sexual Activity   • Alcohol use: No     Comment: caffeine use: 2-3 cups daily: decaf    • Drug use: No   • Sexual activity: Defer           Review of Systems   Constitution: Negative for chills, decreased appetite, diaphoresis, fever and malaise/fatigue.   HENT: Negative for nosebleeds.    Eyes: Negative for blurred vision.   Cardiovascular: Negative for chest pain, claudication, cyanosis, dyspnea on exertion, irregular heartbeat, leg swelling, near-syncope, orthopnea, palpitations, paroxysmal nocturnal dyspnea and syncope.   Respiratory: Negative for cough, hemoptysis, shortness of breath, sleep disturbances due to breathing, snoring and wheezing.    Hematologic/Lymphatic: Negative for bleeding problem. Does not bruise/bleed easily.   Musculoskeletal: Negative for falls.   Gastrointestinal: Negative for heartburn.   Neurological: Negative for excessive daytime sleepiness, dizziness, headaches, light-headedness, numbness and weakness.       Procedures    ECG 12 Lead    Date/Time: 2/23/2021 11:14 AM  Performed by: Yi Ware APRN  Authorized by: Yi Ware APRN   Comparison: compared with previous ECG from 1/12/2021  Similar to previous ECG  Rhythm: sinus rhythm  Rate: normal  Conduction: non-specific intraventricular conduction delay  T inversion: aVL, V1 and V2  T flattening: aVL and V2  QRS axis: normal    Clinical impression: abnormal EKG                Objective:      Vitals:    02/23/21 1055   BP: 172/80   Pulse: 54   Weight: 86.6 kg (191 lb)   Height: 177.8 cm (70\")     Body mass index is 27.41 kg/m².    Constitutional:       " Appearance: Normal and healthy appearance. Well-developed.   Eyes:      Conjunctiva/sclera: Conjunctivae normal.   Neck:      Vascular: No carotid bruit.   Pulmonary:      Effort: Pulmonary effort is normal.      Breath sounds: Normal breath sounds.   Cardiovascular:      PMI at left midclavicular line. Normal rate. Regular rhythm.      Murmurs: There is no murmur.   Pulses:     Intact distal pulses.   Edema:     Peripheral edema absent.   Abdominal:      General: There is no abdominal bruit.   Neurological:      Mental Status: Alert.   Psychiatric:         Behavior: Behavior is cooperative.         Lab Review:       Assessment:      Diagnosis Plan   1. Essential hypertension     2. Mixed hyperlipidemia     3. Coronary-myocardial bridge     4. Heart murmur     5. Sinus bradycardia     6. PAF (paroxysmal atrial fibrillation) (CMS/HCC)       1. Hypertension, uncontrolled. Goal 120/80.  2. Paroxysmal A. fib with RVR.  3 episodes since 9/10/2020.  On apixaban 5 mg twice a day.  Status post ablation 11/23/2020.  Still had intermittent A. fib after ablation, was on amiodarone for this. Stopped amiodarone today.   3. Noncardiac chest pain. Normal stress nuclear perfusion study done January 2013.  4. History of renal cyst, stable.  5. Hyperlipidemia low HDL.  6. 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 Stage I prostate cancer 2/2015, status post radiation with Dr. Garcia.      Plan:       He has not had any atrial fibrillation so we will stop his amiodarone today. He continues to have hypertension. I do not have any room to increase his amlodipine due to lower extremity edema. I will increase his losartan to 50 mg twice a day. We will have him follow-up in 2 weeks via phone call with his blood pressures. If we are still not able to get control could consider adding Lasix. He has been on it in the past and tolerated it well.  He has an appointment with Dr. Olivier on April 1.  We will have him keep that  appointment and follow-up with Dr. Cabezas in 3 months after that.

## 2021-02-24 ENCOUNTER — HOSPITAL ENCOUNTER (OUTPATIENT)
Dept: VACCINE CLINIC | Facility: HOSPITAL | Age: 81
Discharge: HOME | End: 2021-02-24
Attending: INTERNAL MEDICINE

## 2021-03-15 ENCOUNTER — HOSPITAL ENCOUNTER (OUTPATIENT)
Dept: OTHER | Facility: HOSPITAL | Age: 81
Discharge: HOME | End: 2021-03-15
Attending: FAMILY MEDICINE

## 2021-03-15 LAB
BASOPHILS # BLD MANUAL: 0.04 10*3/UL (ref 0–0.2)
BASOPHILS NFR BLD MANUAL: 1.2 % (ref 0–3)
DEPRECATED RDW RBC AUTO: 53 FL
EOSINOPHIL # BLD MANUAL: 0.03 10*3/UL (ref 0–0.7)
EOSINOPHIL NFR BLD MANUAL: 0.9 % (ref 0–7)
ERYTHROCYTE [DISTWIDTH] IN BLOOD BY AUTOMATED COUNT: 13.5 % (ref 11.5–14.5)
GRANS (ABSOLUTE): 1.73 10*3/UL (ref 2–8)
GRANS: 52.8 % (ref 30–85)
HBA1C MFR BLD: 11.9 G/DL (ref 14–18)
HCT VFR BLD AUTO: 34.4 % (ref 42–52)
IMM GRANULOCYTES # BLD: 0 10*3/UL (ref 0–0.54)
IMM GRANULOCYTES NFR BLD: 0 % (ref 0–0.43)
LYMPHOCYTES # BLD MANUAL: 1.11 10*3/UL (ref 1–5)
LYMPHOCYTES NFR BLD MANUAL: 11.3 % (ref 3–10)
MCH RBC QN AUTO: 36.7 PG (ref 27–31)
MCHC RBC AUTO-ENTMCNC: 34.6 G/DL (ref 33–37)
MCV RBC AUTO: 106.2 FL (ref 80–96)
MONOCYTES # BLD AUTO: 0.37 10*3/UL (ref 0.2–1.2)
PLATELET # BLD AUTO: 180 10*3/UL (ref 130–400)
PMV BLD AUTO: 8.9 FL (ref 7.4–10.4)
RBC # BLD AUTO: 3.24 10*6/UL (ref 4.7–6.1)
VARIANT LYMPHS NFR BLD MANUAL: 33.8 % (ref 20–45)
WBC # BLD AUTO: 3.28 10*3/UL (ref 4.8–10.8)

## 2021-03-16 LAB
FOLATE SERPL-MCNC: >20 NG/ML (ref 4.8–20)
IRON SATN MFR SERPL: 35 % (ref 20–55)
IRON SERPL-MCNC: 86 UG/DL (ref 70–180)
RBC # BLD AUTO: 3.3 10*6/UL (ref 4.7–6.1)
RETICS # AUTO: 0.09 10*6/UL (ref 0.03–0.1)
RETICS/RBC NFR AUTO: 2.72 % (ref 0.51–1.81)
T4 FREE SERPL-MCNC: 1.4 NG/DL (ref 0.9–1.8)
TIBC SERPL-MCNC: 245 UG/DL (ref 245–450)
TRANSFERRIN SERPL-MCNC: 171 MG/DL (ref 215–365)
TSH SERPL-ACNC: 13.03 M[IU]/L (ref 0.27–4.2)
VIT B12 SERPL-MCNC: 1280 PG/ML (ref 211–911)
WBC # BLD AUTO: 3.33 10*3/UL (ref 4.8–10.8)

## 2021-03-25 ENCOUNTER — HOSPITAL ENCOUNTER (OUTPATIENT)
Dept: VACCINE CLINIC | Facility: HOSPITAL | Age: 81
Discharge: HOME | End: 2021-03-25
Attending: INTERNAL MEDICINE

## 2021-04-01 ENCOUNTER — OFFICE VISIT (OUTPATIENT)
Dept: CARDIOLOGY | Facility: CLINIC | Age: 81
End: 2021-04-01

## 2021-04-01 VITALS
WEIGHT: 184 LBS | SYSTOLIC BLOOD PRESSURE: 140 MMHG | HEIGHT: 70 IN | DIASTOLIC BLOOD PRESSURE: 76 MMHG | HEART RATE: 60 BPM | BODY MASS INDEX: 26.34 KG/M2

## 2021-04-01 DIAGNOSIS — I48.0 AF (PAROXYSMAL ATRIAL FIBRILLATION) (HCC): Primary | ICD-10-CM

## 2021-04-01 PROCEDURE — 93000 ELECTROCARDIOGRAM COMPLETE: CPT | Performed by: INTERNAL MEDICINE

## 2021-04-01 PROCEDURE — 99213 OFFICE O/P EST LOW 20 MIN: CPT | Performed by: INTERNAL MEDICINE

## 2021-04-01 NOTE — PROGRESS NOTES
Date of Office Visit: 2021  Encounter Provider: Brandyn Olivier MD  Place of Service: [facility] CARDIOLOGY  Patient Name: Quincy Roberts  :1940    Chief Complaint   Patient presents with   • paroxysmal AFIB   :     HPI: Quincy Roberts is a 80 y.o. male who presents today for follow-up of paroxsymal atrial fibrillation.     He had atrial fibrillation ablation .     The patient had some initial difficulties with atrial fibrillation just after the procedure, and was readmitted.      We started him on a little amiodarone, and no more atrial fibrillation since.  He has been off atrial fibrillation since .     He was very symptomatic with his atrial fibrillation.      He reports improvement in lower extremity swelling.           Past Medical History:   Diagnosis Date   • A-fib (CMS/HCC)    • Anemia    • Diverticulosis    • GERD (gastroesophageal reflux disease)    • Health care maintenance    • History of colon polyps    • History of jaundice     Childhood   • History of prostate cancer    • History of radiation therapy    • Hyperlipidemia    • Hypertension    • Kidney stones    • Osteoarthritis    • Prostate cancer (CMS/HCC)    • Pyogenic arthritis of left knee joint (CMS/HCC)        Past Surgical History:   Procedure Laterality Date   • CARDIAC CATHETERIZATION     • CARDIAC ELECTROPHYSIOLOGY PROCEDURE N/A 2020    Procedure: Ablation atrial fibrillation;  Surgeon: Brandyn Olivier MD;  Location: [facility] CATH INVASIVE LOCATION;  Service: Cardiovascular;  Laterality: N/A;   • COLONOSCOPY N/A 8/10/2018    Procedure: COLONOSCOPY INTO CECUM AND TERMINAL ILEUM;  Surgeon: Valentino Stern MD;  Location: [facility] ENDOSCOPY;  Service: Gastroenterology   • ENDOSCOPY N/A 2017    Procedure: ESOPHAGOGASTRODUODENOSCOPY WITH COLD BIOPSIES;  Surgeon: Valentino PEÑA MD;  Location: [facility] ENDOSCOPY;  Service:    • HERNIA REPAIR     • ROTATOR CUFF  REPAIR Left 2003   • TOTAL HIP ARTHROPLASTY Right 2011       Social History     Socioeconomic History   • Marital status:      Spouse name: Kassidy   • Number of children: Not on file   • Years of education: High School   • Highest education level: Not on file   Tobacco Use   • Smoking status: Never Smoker   • Smokeless tobacco: Never Used   Vaping Use   • Vaping Use: Never used   Substance and Sexual Activity   • Alcohol use: No     Comment: caffeine use: 2-3 cups daily: decaf    • Drug use: No   • Sexual activity: Defer       Family History   Problem Relation Age of Onset   • Breast cancer Mother 70   • Dementia Mother    • Malig Hyperthermia Neg Hx        Review of Systems   Constitutional: Negative.   Cardiovascular: Negative.    Respiratory: Negative.    Gastrointestinal: Negative.        Allergies   Allergen Reactions   • Metoclopramide Anaphylaxis   • Carvedilol Unknown - Low Severity     Intolerant   • Hctz [Hydrochlorothiazide] Itching   • Bystolic [Nebivolol Hcl] Other (See Comments)     Bradycardia, fatigue, decreased energy    • Spironolactone Rash     Rash and breast tenderness         Current Outpatient Medications:   •  Acetaminophen (TYLENOL PO), Take 1 tablet by mouth As Needed., Disp: , Rfl:   •  amLODIPine (NORVASC) 5 MG tablet, Take 1 tablet by mouth Daily., Disp: 90 tablet, Rfl: 3  •  apixaban (ELIQUIS) 5 MG tablet tablet, Take 1 tablet by mouth Every 12 (Twelve) Hours., Disp: 180 tablet, Rfl: 3  •  Cholecalciferol (VITAMIN D3 PO), Take 1,000 mg by mouth Daily., Disp: , Rfl:   •  coenzyme Q10 50 MG capsule capsule, Take 50 mg by mouth Daily., Disp: , Rfl:   •  doxazosin (CARDURA) 4 MG tablet, Take 1.5 tablets by mouth Every Night., Disp: 135 tablet, Rfl: 3  •  esomeprazole (NEXIUM) 40 MG capsule, Take 40 mg by mouth Every Morning Before Breakfast., Disp: , Rfl:   •  fluticasone (FLONASE) 50 MCG/ACT nasal spray, 2 sprays into the nostril(s) as directed by provider As Needed., Disp: , Rfl:   •  " Lactobacillus (PROBIOTIC ACIDOPHILUS PO), Take 1 tablet by mouth Daily., Disp: , Rfl:   •  levothyroxine (SYNTHROID, LEVOTHROID) 50 MCG tablet, Take 100 mcg by mouth Daily., Disp: , Rfl:   •  losartan (COZAAR) 50 MG tablet, Take 1 tablet by mouth 2 (Two) Times a Day., Disp: 180 tablet, Rfl: 3  •  Omega-3 Fatty Acids (FISH OIL) 1000 MG capsule capsule, Take 1,000 mg by mouth Daily With Breakfast. Omega Q plus, Disp: , Rfl:       Objective:     Vitals:    04/01/21 1018   BP: 140/76   Pulse: 60   Weight: 83.5 kg (184 lb)   Height: 177.8 cm (70\")     Body mass index is 26.4 kg/m².    PHYSICAL EXAM:    Vitals and nursing note reviewed.   Constitutional:       Appearance: Healthy appearance.   HENT:      Head: Normocephalic.   Pulmonary:      Effort: Pulmonary effort is normal.   Cardiovascular:      Normal rate. Regular rhythm.   Edema:     Peripheral edema absent.   Skin:     General: Skin is warm.   Neurological:      General: No focal deficit present.      Mental Status: Alert and oriented to person, place and time.   Psychiatric:         Attention and Perception: Attention and perception normal.         Behavior: Behavior is cooperative.             ECG 12 Lead    Date/Time: 4/1/2021 10:37 AM  Performed by: Brandyn Olivier MD  Authorized by: Brandyn Olivier MD   Comparison: compared with previous ECG from 2/23/2021  Similar to previous ECG  Rhythm: sinus rhythm    Clinical impression: normal ECG              Assessment:       Diagnosis Plan   1. AF (paroxysmal atrial fibrillation) (CMS/[facility])            Plan:       He is doing well, no recurrent atrial fibrillation since December.  He is now off amiodarone and out of the blanking period.  He needs to stay on anticoagulation.  I will see him back in 6 months.     As always, it has been a pleasure to participate in your patient's care.      Sincerely,         Brandyn Olivier MD  "

## 2021-05-18 NOTE — PROGRESS NOTES
Alejandra Quincy [place]M 1940     Office/Outpatient Visit    Visit Date: Mon, Oct 14, 2019 12:49 pm    Provider: Jose Valle MD (Assistant: Sarah Spurling, MA)    Location: [facility]        Electronically signed by Jose Valle MD on  10/14/2019 05:29:23 PM                             SUBJECTIVE:        CC:     Russ is a 78 year old White male.  He is here today following a transition of care from an inpatient hospital: [facility].. The patient was admitted on Oct 12 - Oct 13.. Pt was in there for left knee. Fluid build up.. Medications have been reviewed and reconciled with discharge summary..  Pt is no longer on doxazosin.          HPI:     Mr. estrada here today follow-up on his recent hospitalization where he was found to have hemarthrosis of the left knee.  Was initially admitted thinking that he had a pyogenic arthritis.  Cultures however remain negative.  He is not aware of any trauma antecedent to the swelling and pain. Since being home he says he is increasing his activities gradually.  Still has some discomfort but is controlled with Tylenol. He did have a follow-up visit with orthopedics last Friday.  Says he was not given a another follow-up visit just told to return if symptoms worsened An MRI of the knee was not performed, just X-rays.         Hypertension details; he did not bring his blood pressure diary, but says that pressures have been okay.  He is tolerating the medication well without side effects.  Compliance with treatment has been good.      ROS:     CONSTITUTIONAL:  Negative for chills, fatigue and fever.      CARDIOVASCULAR:  Negative for chest pain, orthopnea, paroxysmal nocturnal dyspnea and pedal edema.      RESPIRATORY:  Negative for dyspnea and cough.      GASTROINTESTINAL:  Negative for abdominal pain, heartburn, constipation, diarrhea, and stool changes.      GENITOURINARY:  Negative for dysuria and polyuria.      PSYCHIATRIC:  Negative for anxiety and  depression.          PMH/FMH/SH:     Last Reviewed on 11/02/2018 09:02 AM by Jose Valle    Past Medical History:             PAST MEDICAL HISTORY             CURRENT MEDICAL PROVIDERS:    Cardiologist: Jocelynn    Dermatologist: Brandt    Gastroenterologist: Ebony    Ophthalmologist: [facility]    [facility]    [facility]rologist: [facility]             ADVANCE DIRECTIVES: Living will [facility] MAINTENANCE             COLORECTAL CANCER SCREENING: Up to date (colonoscopy q10y; sigmoidoscopy q5y; Cologuard q3y) was last done 8-10-18, Results are in chart; colonoscopy with normal results     EYE EXAM: was last done 12/2016     PSA: was last done 5/1/18         PAST MEDICAL HISTORY         Positive for    Prostate cancer: dx'd in 2015;  s/p XRT; ;         Surgical History:         Biopsy of colonic polyp; '95, '97, '99    Sinus Surg;    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      Positive for Breast Cancer ( mother ).      Positive for Alzheimer's Disease ( mother ) and Pituitary Adenomy - Dad.          Social History:     Occupation: Farmer     Marital Status:      Children: 3 children         Tobacco/Alcohol/Supplements:     Last Reviewed on 7/01/2019 08:59 AM by Olga Contreras    Tobacco: He has never smoked.          Alcohol:  Does not drink alcohol and never has.              Allergies:     Last Reviewed on 7/01/2019 08:59 AM by Olga Contreras    Reglan:        Current Medications:     Last Reviewed on 7/01/2019 09:02 AM by Olga Contreras    Amlodipine  5mg Tablet Take 1 tablet in the morning and  one-half tabet qhs     Cardura 8mg Tablet Take 1/2 tablet(s) by mouth at hs     Clonidine HCl 0.1mg Tablet 1 tab po q 4 hrs prn systolic blood pressure greater than 180     Vitamin D3 1,000IU Tablet 1 tab daily     Multivitamin/Mineral Supplement     Coenzyme Q10 50mg Capsules Take 1 capsule(s) by mouth daily     Fluticasone Propionate 50mcg/1actuation Nasal Spray 2 spray(s) daily each nare     Niacin (Nicotinic Acid) 250mg Tablet 1 x day     ProBiotic Jr.* powder for mixing 1 packet daily     Nexium 40mg Capsules, Delayed Release 1 capsule daily     wintertime drink, vitamin c, calcium, magnesium     Vitamin B6 25mg Tablet Take 1 tablet(s) by mouth daily     Folic Acid/fish oil pill         OBJECTIVE:        Vitals:         Current: 10/14/2019 12:55:37 PM    Ht:  5 ft, 9 in;  Wt: 190.4 lbs;  BMI: 28.1    T: 98.2 F (oral);  BP: 140/64 mm Hg (left arm, sitting);  P: 65 bpm (left arm (BP Cuff), sitting);  sCr: 1.05 mg/dL;  GFR: 57.46        Exams:     PHYSICAL EXAM:     GENERAL:  well developed and nourished; appropriately groomed; in no apparent distress;     EYES: Nonicteric and with unremarkable lids, iris and pupils;     E/N/T:  normal EACs, TMs, nasal/oral mucosa, teeth, gingiva, and oropharynx;     NECK: carotid exam reveals no bruits;     RESPIRATORY: normal respiratory rate and pattern with no distress; normal breath sounds with no rales, rhonchi, wheezes or rubs;     CARDIOVASCULAR: normal rate; rhythm is regular;  no systolic murmur;     LYMPHATIC: no enlargement of cervical or facial nodes;     SKIN:  no significant rashes or lesions; no suspicious moles;     MUSCULOSKELETAL: Left knee is significantly swollen compared to the right and some warmth to palpation.  There is no redness.  Is got good range of motion in the knee.  He says it substantially improved from when he presented to the hospital.; There is also some dependent edema left lower extremity below the knee     NEUROLOGIC: No lateralizing  deficit.;     PSYCHIATRIC:  appropriate affect and demeanor; normal speech pattern; grossly normal memory;         ASSESSMENT           719.16   M25.069  Hemarthrosis, lower leg              DDx:     401.1   I10  Hypertension              DDx:         ORDERS:         Lab Orders:       [id number]  [facility] CBC with 3 part diff  (Send-Out)                   PLAN:          Hemarthrosis, lower leg We will recheck CBC to evaluate for possible infection.  Otherwise continue on his current regimen of rehabilitation.  If his symptoms plateau or worsen I told him we probably want to get an MRI of the knee           Orders:       [id number]  [facility] CBC with 3 part diff  (Send-Out)            Hypertension Blood pressure is borderline today.  Continue on his current medications.  He follows blood pressures at home.  Let us know if they become elevated             CHARGE CAPTURE           **Please note: ICD descriptions below are intended for billing purposes only and may not represent clinical diagnoses**        Primary Diagnosis:         719.16 Hemarthrosis, lower leg            M25.069    Hemarthrosis, unspecified knee              Orders:          [id number]   Transitional care manage service 14 day discharge  (In-House)           401.1 Hypertension            I10    Essential (primary) hypertension

## 2021-05-18 NOTE — PROGRESS NOTES
Quincy Roberts  1940     Office/Outpatient Visit    Visit Date: Fri, Miguel 3, 2020 08:35 am    Provider: Jose Valle MD (Assistant: Spurling, Sarah C, MA)    Location: [facility]        Electronically signed by Jose Valle MD on  01/03/2020 09:29:47 AM                             Subjective:        CC: Russ is a 79 year old White male.  This is a follow-up visit.  *NO LONGER ON CLONIDINE*         HPI:       His blood pressures have been running high.  Cardiology has titrated his medicines up now and worries taking 40 mg of lisinopril and he is increased his amlodipine from 2.5 all the way up to 10 mg and I have added Lasix 20 mg as well. Upon further inspection I noticed that he was no longer taking Cardura and he says he stopped it because he did not think it was helping with his prostate.  Ends up that he stopped it about 3 months ago and that is about when his blood pressures went up so I have to think there was a correlation thereFollows up with Cardiolgist next week.      As far as his left knee hemarthrosis he has been back to the orthopedist reviewed that note again.  Had further aspiration and injection of steroid and says since then he is done quite a bit better.      In regards to his anemia and leukocytopenia he did see heme-onc and we reviewed that note.Is going to see Heme/Onc again later this month.    ROS:     CONSTITUTIONAL:  Negative for chills, fatigue and fever.      EYES:  Positive for Also tells me that he will be having cataract surgery pretty soon.      CARDIOVASCULAR:  Negative for chest pain, orthopnea, paroxysmal nocturnal dyspnea and pedal edema.      RESPIRATORY:  Negative for dyspnea and cough.      GASTROINTESTINAL:  Negative for abdominal pain, heartburn, constipation, diarrhea, and stool changes.      GENITOURINARY:  Negative for dysuria and polyuria.  He will have follow-up with his urologist in March and may check the PSA at that time.Some  tenderness around previous left hernia surgery.  Has been lifting feed buckets.     PSYCHIATRIC:  Negative for anxiety and depression.          Past Medical History / Family History / Social History:         Last Reviewed on 1/03/2020 08:57 AM by Jose Valle    Past Medical History:             PAST MEDICAL HISTORY         Positive for    Hemarthosis 2019;         CURRENT MEDICAL PROVIDERS:    Cardiologist: Jocelynn    Dermatologist: Brandt    Gastroenterologist: Ebony    Ophthalmologist: [facility]    [facility]    Urologist: [facility]         PREVENTIVE HEALTH MAINTENANCE             COLORECTAL CANCER SCREENING: Up to date (colonoscopy q10y; sigmoidoscopy q5y; Cologuard q3y) was last done 8-10-18, Results are in chart; colonoscopy with normal results     EYE EXAM: was last done 12/2016     PSA: was last done 5/1/18         PAST MEDICAL HISTORY         Positive for    Prostate cancer: dx'd in 2015;  s/p XRT; ;         Surgical History:         Biopsy of colonic polyp; '95, '97, '99    Sinus Surg;    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      Positive for Breast Cancer ( mother ).      Positive for Alzheimer's Disease ( mother ) and Pituitary Adenomy - Dad.          Social History:     Occupation: Farmer     Marital Status:      Children: 3 children         Tobacco/Alcohol/Supplements:     Last Reviewed on 1/03/2020 08:35 AM by Spurling, Sarah C    Tobacco: He has never smoked.          Alcohol:  Does not drink alcohol and never has.          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    Encounter for screening for other disorder    Bilateral primary osteoarthritis of knee        Allergies:     Last Reviewed on 1/03/2020 08:35 AM by Spurling, Sarah C    Reglan:          Current Medications:     Last Reviewed on 1/03/2020 08:41 AM by Spurling, Sarah C    Multivitamin/Mineral Supplement     Clonidine HCl 0.1mg Tablet [1 tab po q 4 hrs prn systolic blood pressure greater than 180]    Cardura 8mg Tablet [Take 1/2 tablet(s) by mouth at hs]    Nexium 40 mg oral capsule,delayed release (enteric coated) [1 capsule daily]    Folic Acid/fish oil pill     Vitamin D3 25 mcg (1,000 unit) oral tablet [1 tab daily]    Vitamin B-6 25 mg oral tablet [Take 1 tablet(s) by mouth daily]    wintertime drink, vitamin c, calcium, magnesium     ProBiotic Jr.* powder for mixing [1 packet daily]    Coenzyme Q10 50 mg oral capsule [Take 1 capsule(s) by mouth daily]    Fluticasone Propionate 50 mcg/actuation Intranasal Spray, Suspension [2 spray(s) daily each nare]    niacin 250 mg oral tablet [1 x day]    amLODIPine 10 mg oral tablet [take 1 tablet (10 mg) by oral route once daily]    Lasix 20 mg oral tablet [1 tablet po once a day]    lisinopril 40 mg oral tablet [take 1 tablet (40 mg) by oral route once daily]        Objective:        Vitals:         Current: 1/3/2020 8:45:21 AM    Ht:  5 ft, 9 in;  Wt: 186.4 lbs;  BMI: 27.5T: 98.3 F (oral);  BP: 140/66 mm Hg (left arm, sitting);  P: 72 bpm (left arm (BP Cuff), sitting);  sCr: 1.05 mg/dL;  GFR: 56.06        Exams:     PHYSICAL EXAM:     GENERAL:  well developed and nourished; appropriately groomed; in no apparent distress;     EYES:  Nonicteric and with unremarkable lids, iris and pupils;     E/N/T:  normal EACs, TMs, nasal/oral mucosa, teeth, gingiva, and oropharynx;     NECK: carotid exam reveals no bruits;     RESPIRATORY: normal respiratory rate and pattern with no distress; normal breath sounds with no rales, rhonchi, wheezes or rubs;     CARDIOVASCULAR: normal rate; rhythm is regular;  no systolic murmur;     GASTROINTESTINAL: nontender, nondistended; no hepatosplenomegaly or masses; no bruits;     GENITOURINARY: left groin without palp mass, slight tenderness;     LYMPHATIC: no enlargement of cervical or facial nodes;     MUSCULOSKELETAL: normal overall tone No pedal edema.;     NEUROLOGIC: No lateralizing deficit.;     PSYCHIATRIC:  appropriate affect and demeanor; normal speech pattern; grossly normal memory;         Assessment:         I10   Essential (primary) hypertension       M17.0   Bilateral primary osteoarthritis of knee       D64.9   Anemia, unspecified       D72.819   Decreased white blood cell count, unspecified           ORDERS:         Lab Orders:       [id number]  [facility] CMP AND LIPID: [id number]  (Send-Out)            [id number]  [facility] CBC with 3 part diff  (Send-Out)                      Plan:         Essential (primary) hypertensionContinue his current medical regimen.  I did ask him to point out to the cardiology folks that he had dropped his Cardura at about the time his blood pressure went up.  Perhaps they will be happy with his current regimen or if they would rather switch him back to Cardura may find it more effective.    LABORATORY:  Labs ordered to be performed today include HTN/Lipid Panel: CMP, Lipid.            Orders:       [id number]  [facility] CMP AND LIPID: [id number]  (Send-Out)              Bilateral primary osteoarthritis of kneeWe will follow-up with orthopedist as directed.  Seems to be doing pretty well at present.        Anemia, unspecifiedWe will go ahead and check labs and forward those  results to heme-onc    LABORATORY:  Labs ordered to be performed today include CBC.            Orders:       [id number]  [facility] CBC with 3 part diff  (Send-Out)                  Other Patient Education Handouts:     Dragon transcription disclaimer:        Much of this encounter note is an electronic transcription/translation of spoken language to printed text.  The electronic translation of spoken language may permit erroneous, or at times, nonsensical words or phrases to be inadvertently transcribed.  Although I have reviewed the note for such errors, some may still exist.        Charge Capture:         Primary Diagnosis:     I10  Essential (primary) hypertension           Orders:      [id number]  Office/outpatient visit; established patient, level 4  (In-House)              M17.0  Bilateral primary osteoarthritis of knee     D64.9  Anemia, unspecified     D72.819  Decreased white blood cell count, unspecified         ADDENDUMS:      ____________________________________    Addendum: 01/06/2020 02:51 PM - Three, Team         Visit Note Faxed to:        User Entered Recipient; Number [phone]            Addendum: 01/06/2020 02:51 PM - [employer], Team         Visit Note Faxed to:        User Entered Recipient; Number [phone]            Addendum: 01/06/2020 02:53 PM - [facility], Team         Visit Note Faxed to:        User Entered Recipient; Number [phone]

## 2021-05-18 NOTE — PROGRESS NOTES
Quincy Roberts  1940     Office/Outpatient Visit    Visit Date: Fri, Sep 11, 2020 11:18 am    Provider: Jose Rollins MD (Assistant: Sonya Gonzalez MA)    Location: [facility]        Electronically signed by Jose Rollins MD on  09/11/2020 12:43:56 PM                             Subjective:        CC: Russ is a 79 year old White male.  bp/HR issues         HPI:           Patient to be evaluated for essential (primary) hypertension.  His current cardiac medication regimen includes a diuretic ( lasix 20 mg daily ) and a calcium channel blocker ( amlodipine 10 mg daily ).  Compliance with treatment has been good; he takes his medication as directed and follows up as directed.  He is tolerating the medication well without side effects.  Review of his blood pressure log reveals systolics in the 120-140 range and diastolics in the 70-80 range.  However, he had an episode last night where he was sitting on the couch and became intermittently lightheaded.  He checked his blood pressure on 3 separate occasions over the ensuing next 30 minutes and had readings varying from 1 40-1 60 systolic over 90-1 10 diastolic.  Additionally, his heart rate varied from 100-1 30. A family member who is a nurse checked his oxygen and listen to his heart.  She said that she thought his heart rate may be irregular.  She advised him to hydrate aggressively.  After drinking approximately 48 ounces of water, he rechecked his blood pressure it was 130/66 with a heart rate of 70.  Today, he says that he has not had any recurrent episodes.  He denies headache, blurry vision, syncope, chest pain, palpitations or edema          Dx with hypothyroidism, unspecified; he is currently taking Levothyroid, 25 mcg daily.  The result was reported as high ( 6.710 on 6/30/20 mU/L ).  He denies any related symptoms.  He reports no symptoms suggestive of adverse medication effect.  .    Finally, Russ reports that he got his  finger caught between 2 pieces of metal and suffered a laceration and bruising. It is not overly painful. He doesn't think it looks infected but he says that he is overdue for a tetanus shot and would like to get this done today.    ROS:     CONSTITUTIONAL:  Negative for chills, fatigue and fever.      EYES:  Negative for blurred vision.      CARDIOVASCULAR:  Positive for dizziness and tachycardia.   Negative for chest pain, palpitations or edema.      RESPIRATORY:  Negative for dyspnea and cough.      GASTROINTESTINAL:  Negative for abdominal pain, diarrhea, heartburn, nausea and vomiting.      NEUROLOGICAL:  Negative for headaches, paresthesias and weakness.      ENDOCRINE:  Negative for hair loss, heat/cold intolerance, polydipsia, and polyphagia.      PSYCHIATRIC:  Negative for anxiety, depression, and sleep disturbances.          Past Medical History / Family History / Social History:         Last Reviewed on 9/11/2020 12:43 PM by Jose Rollins    Past Medical History:             PAST MEDICAL HISTORY         Positive for    Hemarthosis 2019;         CURRENT MEDICAL PROVIDERS:    Cardiologist: Jocelynn    Dermatologist: Brandt    Gastroenterologist: Ebony    Ophthalmologist: [facility]    [facility]    [facility]         PREVENTIVE HEALTH MAINTENANCE             COLORECTAL CANCER SCREENING: Up to date (colonoscopy q10y; sigmoidoscopy q5y; Cologuard q3y) was last done 8-10-18, Results are in chart; colonoscopy with normal results; 8/10/18     EYE EXAM: was last done 6/2020     PSA: was last done 5/1/18         PAST MEDICAL HISTORY         Positive for    Prostate cancer: dx'd in 2015;  s/p XRT; ;         PAST MEDICAL HISTORY         Positive for    Myocardial bridge;     Positive for    Renal Cyst;         CURRENT MEDICAL PROVIDERS:    Cardiologist: Irineo Weber    General Surgeon: Jarred    Ophthalmologist: Tuyet    Orthopedist: Akil          Surgical History:         Cataract Removal: left; 2019;     Biopsy of colonic polyp; '95, '97, '99    Sinus Surg;    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      Positive for Breast Cancer ( mother ).      Positive for Alzheimer's Disease ( mother ) and Pituitary Adenomy - Dad.          Social History:     Occupation: Farmer     Marital Status:      Children: 3 children         Tobacco/Alcohol/Supplements:     Last Reviewed on 9/11/2020 12:43 PM by Jose Rollins    Tobacco: He has never smoked.          Alcohol:  Does not drink alcohol and never has.          Substance Abuse History:     Last Reviewed on 9/11/2020 12:43 PM by Jose Rollins        Mental Health History:     Last Reviewed on 9/11/2020 12:43 PM by Jose Rollins        Communicable Diseases (eg STDs):     Last Reviewed on 9/11/2020 12:43 PM by Jose Rollins        Current Problems:     Last Reviewed on 9/11/2020 12:43 PM by Jose Rollins    Essential (primary) hypertension    Rivas's esophagus without dysplasia    Personal history of malignant neoplasm of prostate    Bilateral primary osteoarthritis of knee    Other spondylosis, cervical region    Cyst of kidney, acquired    Anemia, unspecified    Decreased white blood cell count, unspecified    Malignant melanoma of scalp and neck    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    Encounter for screening for other disorder    Encounter for general adult medical examination without abnormal findings    Encounter for screening for depression    Hypothyroidism, unspecified    Tachycardia, unspecified    Dizziness and giddiness    Laceration without foreign body of right index finger  without damage to nail, initial encounter        Immunizations:     Tetanus toxoid adsorbed 1/22/2011    Td adult 0/0/2006    Prevnar 13 (Pneumococcal PCV 13) 12/24/2015    Fluarix pf 10/6/2019    Fluzone (3 + years dose) 10/1/2012    Fluzone High-Dose pf (>=65 yr) 10/24/2016    Fluzone High-Dose pf (>=65 yr) 10/17/2017    Fluzone High-Dose pf (>=65 yr) 10/15/2018    PNEUMOVAX 23 (Pneumococcal PPV23) 11/24/2008        Allergies:     Last Reviewed on 9/11/2020 12:43 PM by Jose Rollinslan:      hydrochlorothiazide: Itching of skin  (Adverse Reaction)        Current Medications:     Last Reviewed on 9/11/2020 12:43 PM by Jose Rollins    amLODIPine 10 mg oral tablet [take 1 tablet (10 mg) by oral route once daily]    Lasix 20 mg oral tablet [1 tablet po once a day]    OMEGA Q PLUS     aspirin 81 mg oral tablet, delayed release (enteric coated) [take 1 tablet (81 mg) by oral route once daily]    doxazosin 2 mg oral tablet [take 1 tablet (2 mg) by oral route once daily]    Multivitamin/Mineral Supplement     Nexium 40 mg oral capsule,delayed release (enteric coated) [1 capsule daily]    Folic Acid/fish oil pill     Vitamin D3 25 mcg (1,000 unit) oral tablet [1 tab daily]    Vitamin B-6 25 mg oral tablet [Take 1 tablet(s) by mouth daily]    wintertime drink, vitamin c, calcium, magnesium     ProBiotic Jr.* powder for mixing [1 packet daily]    Coenzyme Q10 50 mg oral capsule [Take 1 capsule(s) by mouth daily]    Fluticasone Propionate 50 mcg/actuation Intranasal Spray, Suspension [2 spray(s) daily each nare]    levothyroxine 25 mcg oral tablet [TAKE 1 TABLET EVERY DAY]        Objective:        Vitals:         Current: 9/11/2020 11:25:48 AM    Ht:  5 ft, 9 in;  Wt: 193.4 lbs;  BMI: 28.6T: 97.1 F (oral);  BP: 135/49 mm Hg (left arm, sitting);  P: 63 bpm (left arm (BP Cuff), sitting);  sCr: 1.08 mg/dL;  GFR: 55.37        Exams:     PHYSICAL EXAM:     GENERAL: Vitals recorded well developed, well nourished;  no  "apparent distress;     EYES: conjunctiva and cornea are normal;     NECK: trachea is midline; thyroid is non-palpable;     RESPIRATORY: Clear to auscultation bilateally; no rales (\"crackles\") present; no rhonchi; no wheezes;     CARDIOVASCULAR: normal rate; rhythm is regular;  No murmurs, clicks, gallops or rubs appreciated; no edema;     LYMPHATIC: no enlargement of cervical or facial nodes; no supraclavicular nodes;     SKIN:  No significant rashes, lesions or suspicious moles within limits of examination;     NEUROLOGIC: Grossly intact; mental status: alert and oriented x 3;     PSYCHIATRIC: appropriate affect and demeanor; normal speech pattern; Normal behavior;         Lab/Test Results:         LABORATORY RESULTS: EKG performed by tls         Assessment:         R42   Dizziness and giddiness       R00.0   Tachycardia, unspecified       I10   Essential (primary) hypertension       E03.9   Hypothyroidism, unspecified       S61.210A   Laceration without foreign body of right index finger without damage to nail, initial encounter           ORDERS:         Radiology/Test Orders:       [id number]  Electrocardiogram, routine with at least 12 leads; with interpretation and report  (In-House)              Procedures Ordered:       [id number]  Immunization administration; one vaccine  (In-House)              Other Orders:       [id number]  Td vaccine prsrv free 7 yrs or older for IM use  (In-House)                      Plan:         Dizziness and giddiness- 1 symptomatic episode. Has not recurred. EKG unremarkable in office today. Echo unremarkable in Feb 2020. CMP and TSH ordered. If sx recur, will consider sooner cardiology appt or Holter. ED/return precautions given.         Tachycardia, unspecified- See above        Essential (primary) hypertension- BP controlled in office. Continue current regimen. Continue to monitor regularly.           Orders:       24770  Electrocardiogram, routine with at least 12 leads; with interpretation " and report  (In-House)              Hypothyroidism, unspecified- Recently dx. Continue synthroid 25 mcg daily. Repeat TSH today        Laceration without foreign body of right index finger without damage to nail, initial encounter- No signs of infection. Continue to montior. Td given today          Immunizations:       75220  Immunization administration; one vaccine  (In-House)            48765  Td vaccine prsrv free 7 yrs or older for IM use  (In-House)                Dose (ml): 0.5  Site: left deltoid  Route: intramuscular  Administered by: Cami Stauffer          : [employer] [facility]  Lot #: [id number]  Exp: 02/14/2022          NDC: [address]-22            Charge Capture:         Primary Diagnosis:     R42  Dizziness and giddiness           Orders:      46654  Office/outpatient visit; established patient, level 4  (In-House)              R00.0  Tachycardia, unspecified     I10  Essential (primary) hypertension           Orders:      64462  Electrocardiogram, routine with at least 12 leads; with interpretation and report  (In-House)              E03.9  Hypothyroidism, unspecified     S61.210A  Laceration without foreign body of right index finger without damage to nail, initial encounter           Orders:      30388  Immunization administration; one vaccine  (In-House)            08906  Td vaccine prsrv free 7 yrs or older for IM use  (In-House)

## 2021-05-18 NOTE — PROGRESS NOTES
Quincy Roberts  1940     Office/Outpatient Visit    Visit Date: Mon, Jul 6, 2020 09:24 am    Provider: Jose Valle MD (Assistant: Bindu Chavez MA)    Location: [facility]        Electronically signed by Jose Valle MD on  07/06/2020 10:37:20 AM                             Subjective:        CC: Russ is a 79 year old White male.  MEDICARE WELLNESS (PT IS NOT TAKING BYSTOLIC OR LISINOPRIL, CLONIDINE, LASIX, NIACIN, SPIRONOLACTONE)        HPI:           Russ is here for a Medicare wellness visit.  The required HRA questions are integrated within this visit note. Family medical history and individual medical/surgical history were reviewed and updated.  A current height, weight, BMI, blood pressure, and pulse were recorded in the vitals section of the note and have been reviewed. Patient's medications, including supplements, were recorded in the chart and reviewed.  Current providers and suppliers were reviewed and updated.          Self-Assessment of Health: He rates his health as very good. He rates his confidence of being able to control/manage most of his health problems as very confident. His physical/emotional health has limited his social activites not at all.  A review of cognitive impairment was performed, including ability to drive a car, manage finances, and any memory changes, and was found to be negative.  A review of functional ability, including bathing, dressing, walking, and urine/bowel continence as well as level of safety was performed and was found to be negative.  Falls Risk: Has not had any falls or only one fall without injury in the past year.  He denies having trouble hearing the TV/radio when others do not, having to strain to hear or understand conversations and wearing hearing aid(s).  Concerning home safety, he reports that at home he DOES have adequate lighting, a skid resistant shower/tub, grab bars in the bath, handrails on stairs, functioning  smoke alarms and absence of throw rugs.  Physical Activity: Farm work; Type of diet patient normally eats is described as low sodium; well-balanced with fruits and vegetables Tobacco: He has never smoked.  Preventative Health updated today.            PHQ-9 Depression Screening: Completed form scanned and in chart; Total Score 0       Mr. Roberts states that he had gotten on multiple medications for his blood pressure ended up making him just feel awful.  Systolic blood pressures were in the 90s and he was feeling lightheaded and dizzy.    On spironolactone and developed breast tenderness and a rash on his lower extremities.  He ended up stopping most of the medications as noted above.  Says his cough improved and not having problems with lightheadedness or dizziness.  Rash is improving and does not get the orthostatic type symptoms anymore.  He brought in a list of blood pressures since he stopped his spironolactone and they all look pretty good actually      We also recently started him on thyroid medication.  He did receive the medication but had not received the letter we wrote him in regards to the lab results yet.  So we reviewed that today.      He also tells me is recently seen the urologist and they did a follow-up CT scan on his kidney in their office.  We have not received a copy of that report yetHe is getting up 2 or maybe 3 times a night to go to the bathroom.  Urologist gave him a prescription for oxybutynin but he is not sure he is going to take that.  He says now that he does not have to get up to milk he can sleep in late (7:30  ha) so he does not feel like he is losing any sleep    ROS:     CONSTITUTIONAL:  Negative for chills, fatigue and fever.      CARDIOVASCULAR:  Positive for pedal edema ( minimal after standing all day ).   Negative for chest pain, orthopnea or paroxysmal nocturnal dyspnea.      RESPIRATORY:  Negative for dyspnea and cough.      GASTROINTESTINAL:  Negative for abdominal pain,  heartburn, constipation, diarrhea, and stool changes.      GENITOURINARY:  Negative for dysuria and polyuria.      PSYCHIATRIC:  Negative for anxiety and depression.          Past Medical History / Family History / Social History:         Last Reviewed on 7/06/2020 10:34 AM by Jose Valle    Past Medical History:             PAST MEDICAL HISTORY         Positive for    Hemarthosis 2019;         CURRENT MEDICAL PROVIDERS:    Cardiologist: Jocelynn    Dermatologist: Brandt    Gastroenterologist: Ebony    Ophthalmologist: [facility]    [facility]    Urologist: [facility]         PREVENTIVE HEALTH MAINTENANCE             COLORECTAL CANCER SCREENING: Up to date (colonoscopy q10y; sigmoidoscopy q5y; Cologuard q3y) was last done 8-10-18, Results are in chart; colonoscopy with normal results; 8/10/18     EYE EXAM: was last done 6/2020     PSA: was last done 5/1/18         PAST MEDICAL HISTORY         Positive for    Prostate cancer: dx'd in 2015;  s/p XRT; ;         PAST MEDICAL HISTORY         Positive for    Myocardial bridge;     Positive for    Renal Cyst;         CURRENT MEDICAL PROVIDERS:    Cardiologist: Irineo Weber    General Surgeon: Jarred    Ophthalmologist: Tuyet    Orthopedist: Akil         Surgical History:         Cataract Removal: left; 2019;     Biopsy of colonic polyp; '95, '97, '99    Sinus Surg;    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      Positive for Breast Cancer ( mother ).      Positive for Alzheimer's Disease ( mother ) and Pituitary Adenomy - Dad.          Social History:     Occupation: Farmer      Marital Status:      Children: 3 children         Tobacco/Alcohol/Supplements:     Last Reviewed on 7/06/2020 09:24 AM by Bindu Chavez    Tobacco: He has never smoked.          Alcohol:  Does not drink alcohol and never has.          Allergies:     Last Reviewed on 7/06/2020 09:27 AM by Bindu Chavez    Reglan:          Current Medications:     Last Reviewed on 7/06/2020 09:24 AM by Bindu Chavez    amLODIPine 10 mg oral tablet [take 1 tablet (10 mg) by oral route once daily]    Lasix 20 mg oral tablet [1 tablet po once a day]    Multivitamin/Mineral Supplement     Nexium 40 mg oral capsule,delayed release (enteric coated) [1 capsule daily]    Folic Acid/fish oil pill     Vitamin D3 25 mcg (1,000 unit) oral tablet [1 tab daily]    Vitamin B-6 25 mg oral tablet [Take 1 tablet(s) by mouth daily]    wintertime drink, vitamin c, calcium, magnesium     ProBiotic Jr.* powder for mixing [1 packet daily]    Coenzyme Q10 50 mg oral capsule [Take 1 capsule(s) by mouth daily]    Fluticasone Propionate 50 mcg/actuation Intranasal Spray, Suspension [2 spray(s) daily each nare]    niacin 250 mg oral tablet [1 x day]    Synthroid 25 mcg oral tablet [take 1 tablet (25 mcg) by oral route once daily]    OMEGA Q PLUS     aspirin 81 mg oral tablet, delayed release (enteric coated) [take 1 tablet (81 mg) by oral route once daily]    doxazosin 2 mg oral tablet [take 1 tablet (2 mg) by oral route once daily]        Objective:        Vitals:         Current: 7/6/2020 9:32:04 AM    Ht:  5 ft, 9 in;  Wt: 192 lbs;  BMI: 28.4T: 97.5 F (oral);  BP: 131/54 mm Hg (right arm, sitting);  P: 65 bpm (right arm (BP Cuff), sitting);  sCr: 1.08 mg/dL;  GFR: 55.20VA: 20/30 OD, 20/40 OS (near, with correction)        Exams:     PHYSICAL EXAM:     GENERAL:  well developed and nourished; appropriately groomed; in no apparent distress;     EYES: Nonicteric and with unremarkable lids, iris and pupils;     NECK: carotid exam reveals no  bruits;     RESPIRATORY: normal respiratory rate and pattern with no distress; normal breath sounds with no rales, rhonchi, wheezes or rubs;     CARDIOVASCULAR: normal rate; rhythm is regular;  no systolic murmur;     LYMPHATIC: no enlargement of cervical or facial nodes;     MUSCULOSKELETAL: He is got trace to 1+ pitting edema bilaterally.  The left knee is considerably swollen compared to the right but improved from what it used to be.  He has had that drained couple of times with hemarthrosis;     NEUROLOGIC: No lateralizing deficit.;     PSYCHIATRIC:  appropriate affect and demeanor; normal speech pattern; grossly normal memory;         Assessment:         Z00.00   Encounter for general adult medical examination without abnormal findings       Z13.31   Encounter for screening for depression       I10   Essential (primary) hypertension       E03.9   Hypothyroidism, unspecified       N28.1   Cyst of kidney, acquired           ORDERS:         Lab Orders:       FUTURE  Future order to be done at patients convenience  (Send-Out)            48227  HTNLP - [facility] CMP AND LIPID: [id number], [id number]  (Send-Out)            FUTURE  Future order to be done at patients convenience  (Send-Out)            72833  TSH - [facility] TSH  (Send-Out)              Other Orders:         Depression screen negative  (In-House)              Subsequent Annual Well Visit Medicare  (x1)                  Plan:         Encounter for general adult medical examination without abnormal findingsWe reviewed the preventive service recommendations and created an individualized handout.He will be due for a tetanus immunization next year.  Encouraged him to get a Tdap at that time.    ADVANCE DIRECTIVES (Please update in PMH): Living will [facility], Requested         Encounter for screening for depression    MIPS PHQ-9 Depression Screening: Completed form scanned and in chart; Total Score 0; Negative Depression Screen           Orders:          Depression screen negative  (In-House)              Essential (primary) hypertensionLooks like medication simplification has been good for him.  He will continue to monitor at home.  He says that he did clear the discontinuation of the Bystolic with his cardiologist.  He does have a history of the myocardial bridge so was not so sure they would approve We will have him get labs when he comes in for follow-up on his thyroid blood work        FOLLOW-UP TESTING #1: FOLLOW-UP LABORATORY:  Labs to be scheduled in the future include HTN/Lipid Panel: CMP, Lipid.   Patient to schedule in 2 months.            Orders:       FUTURE  Future order to be done at patients convenience  (Send-Out)            36706  HTNLP - [facility] CMP AND LIPID: [id number], [id number]  (Send-Out)              Hypothyroidism, unspecifiedWe will repeat his labs in about 2 months.        FOLLOW-UP TESTING #1: FOLLOW-UP LABORATORY:  Labs to be scheduled in the future include TSH.   Patient to schedule in 2 months.            Orders:       FUTURE  Future order to be done at patients convenience  (Send-Out)            11293  TSH - [facility] TSH  (Send-Out)              Cyst of kidney, acquiredWe will send for copies of his recent visit and CT scan            Other Patient Education Handouts:     Dragon transcription disclaimer:        Much of this encounter note is an electronic transcription/translation of spoken language to printed text.  The electronic translation of spoken language may permit erroneous, or at times, nonsensical words or phrases to be inadvertently transcribed.  Although I have reviewed the note for such errors, some may still exist.        Patient Recommendations:        For  Encounter for general adult medical examination without abnormal findings:    I also recommend [facility], Requested.          For  Essential (primary) hypertension:            The following laboratory testing has been ordered: Schedule the above testing in 2 months.          For   Hypothyroidism, unspecified:            The following laboratory testing has been ordered: TSH Schedule the above testing in 2 months.              Charge Capture:         Primary Diagnosis:     Z00.00  Encounter for general adult medical examination without abnormal findings           Orders:      [id number]  Preventive medicine, established patient, age 65+ years  (In-House)              Subsequent Annual Well Visit Medicare  (x1)          Z13.31  Encounter for screening for depression           Orders:      27882-23  Office/outpatient visit; established patient, level 3  (In-House)              Depression screen negative  (In-House)              I10  Essential (primary) hypertension     E03.9  Hypothyroidism, unspecified     N28.1  Cyst of kidney, acquired

## 2021-05-18 NOTE — PROGRESS NOTES
Quincy Roberts 1940     Office/Outpatient Visit    Visit Date: Tue, May 1, 2018 11:49 am    Provider: Jose Valle MD (Assistant: Tabitha Niño MA)    Location: [facility]        Electronically signed by Jose Valle MD on  05/07/2018 06:53:10 PM                             SUBJECTIVE:        CC:     Russ is a 77 year old White male.  Check up/Med refills         HPI:         Patient to be evaluated for hypertension.  He did not bring his blood pressure diary, but says that pressures have been okay.  He is tolerating the medication well without side effects.  Compliance with treatment has been good.          Has been seeing Dr Wesley Hassan - Pulmonologist - [place].  We don't have copy of those notes yet, but will send for them.         In addition he has seen Cardiology and GI.  We have some reports, but not complete records.             Recently was seen at [facility] and dx with sinusitis on Thursday. They have him on Augmentin.         He is requesting prostate cancer screening.  No problems with urination.      Review of his record shows that he has had renal cysts in the past, but hasn't had follow up on those for a while.  He had been followed by Urologist due to that, and the Prostate Cancer in past.     ROS:     CONSTITUTIONAL:  Negative for chills, fatigue, fever and weight change.      CARDIOVASCULAR:  Negative for chest pain, orthopnea, paroxysmal nocturnal dyspnea and pedal edema.      RESPIRATORY:  Negative for dyspnea and cough.      GASTROINTESTINAL:  Negative for abdominal pain, heartburn, constipation, diarrhea, and stool changes.      GENITOURINARY:  Negative for dysuria and polyuria.      PSYCHIATRIC:  Negative for anxiety and depression.          PMH/FMH/SH:     Last Reviewed on 12/23/2016 10:28 AM by Nydia Jules    Past Medical History:             PAST MEDICAL HISTORY             CURRENT MEDICAL PROVIDERS:    Cardiologist: Jocelynn    Dermatologist:  Brandt    Gastroenterologist: Ebony    Ophthalmologist: [facility]    [facility]    [facility]rologist: [facility]             ADVANCE DIRECTIVES: Living will [facility] HEALTH MAINTENANCE             PNEUMOCOCCAL 23 VACCINE: was last done 11/24/08     Prevnar 13: was last done 12/24/15     INFLUENZA VACCINE: was last done 10/2016 ([facility])     EYE EXAM: was last done 12/2016     SHINGLES VACCINE: pt is checking with pharmacy for price     Td VACCINE: was last done 1/22/11         PAST MEDICAL HISTORY         Positive for    Prostate cancer: dx'd in 2015;  s/p XRT; ;         Surgical History:         Biopsy of colonic polyp; '95, '97, '99    Sinus Surg;    Cardiac Cath 8/02-  Myocardial bridge of LAD;    Breast Bx (Dr Collins Barnard);    Prostate Bx Dec 2014, with radiation;         Procedures:    Left rotator cuff ~ 2000         Family History: Has a Twin Brother         Positive for Congestive Heart Failure ( father ), Hypertension ( father; mother ) and Myocardial Infarction ( father ).      Positive for Breast Cancer ( mother ).      Positive for Alzheimer's Disease ( mother ) and Pituitary Adenomy - Dad.          Social History:     Occupation: Farmer     Marital Status:      Children: 3 children         Tobacco/Alcohol/Supplements:     Last Reviewed on 5/01/2018 11:54 AM by Tabitha Niño    Tobacco: He has never smoked.          Alcohol:  Does not drink alcohol and never has.              Allergies:     Last Reviewed on 5/01/2018 11:54 AM by Tabitha Niño    Reglan:        Current Medications:     Last Reviewed on 5/01/2018 11:55 AM by Tabitha Niño    Amlodipine  5mg Tablet Take 1 tablet in the morning and one-half tabet qhs     Clonidine HCl 0.1mg Tablet 1 tab po q 4 hrs prn systolic blood pressure greater than 180     Nexium 20mg Capsules, Delayed Release Take 1 capsule(s) by mouth BID     Vitamin D3 1,000IU Tablet 1 tab daily      niacin- enduracin 250 mg qd     Multivitamin/Mineral Supplement     Aspirin 81mg Tablets, Enteric Coated Take 1 tablet(s) by mouth qam     ProBiotic Jr.* powder for mixing 1 packet daily     wintertime drink, vitamin c, calcium, magnesium     Vitamin B6 25mg Tablet Take 1 tablet(s) by mouth daily     Folic Acid/fish oil pill     Amoxicillin/Clavulanate 875mg/125mg Tablet 1 tab bid X 10 days     Cardura 8mg Tablet Take 1 tablet(s) by mouth daily         OBJECTIVE:        Vitals:         Current: 5/1/2018 11:53:40 AM    Ht:  5 ft, 9 in;  Wt: 186 lbs;  BMI: 27.5    T: 97.6 F (oral);  BP: 120/63 mm Hg (left arm, sitting);  P: 71 bpm (left arm (BP Cuff), sitting);  sCr: 1.24 mg/dL;  GFR: 48.92        Exams:     PHYSICAL EXAM:     GENERAL:  well developed and nourished; appropriately groomed; in no apparent distress;     EYES: Nonicteric and with unremarkable lids, iris and pupils;     E/N/T:  normal EACs, TMs, nasal/oral mucosa, teeth, gingiva, and oropharynx;     NECK: carotid exam reveals no bruits;     RESPIRATORY: normal respiratory rate and pattern with no distress; normal breath sounds with no rales, rhonchi, wheezes or rubs;     CARDIOVASCULAR: normal rate; rhythm is regular;  no systolic murmur;     LYMPHATIC: no enlargement of cervical or facial nodes;     MUSCULOSKELETAL: normal overall tone No pedal edema.;     NEUROLOGIC: No lateralizing deficit.;     PSYCHIATRIC:  appropriate affect and demeanor; normal speech pattern; grossly normal memory;         ASSESSMENT           401.1   I10  Hypertension              DDx:     461.8   J01.90  Acute sinusitis, other              DDx:     V76.44   Z12.5  Screening for prostate cancer              DDx:     593.2   N28.1  Acquired renal cyst              DDx:         ORDERS:         Lab Orders:       *  PRSAS Medicare screening PSA  (Send-Out)         [id number]  HTNLP - [facility] CMP AND LIPID: [id number]  (Send-Out)                   PLAN:          Hypertension      LABORATORY:  Labs ordered to be performed today include HTN/Lipid Panel: CMP, Lipid.            Orders:       [id number]  HTN - [facility] CMP AND LIPID: [id number]  (Send-Out)             Patient Education Handouts:       [facility] Medication Compliance           Acute sinusitis, other complete treatment since he is on the abx, although not so sure I would have prescribed myself.          Screening for prostate cancer     LABORATORY:  Labs ordered to be performed today include PSA.            Orders:       *  PRSAS Medicare screening PSA  (Send-Out)            Acquired renal cyst Will get a follow up renal ultrasound.             CHARGE CAPTURE           **Please note: ICD descriptions below are intended for billing purposes only and may not represent clinical diagnoses**        Primary Diagnosis:         401.1 Hypertension            I10    Essential (primary) hypertension              Orders:          03806   Office/outpatient visit; established patient, level 4  (In-House)           461.8 Acute sinusitis, other            J01.90    Acute sinusitis, unspecified    V76.44 Screening for prostate cancer            Z12.5    Encounter for screening for malignant neoplasm of prostate    593.2 Acquired renal cyst            N28.1    Cyst of kidney, acquired

## 2021-05-18 NOTE — PROGRESS NOTES
Quincy Roberts  1940     Office/Outpatient Visit    Visit Date: Mon, Jan 11, 2021 09:44 am    Provider: Jose Valle MD (Assistant: Sonya Gonzalez MA)    Location: [facility]        Electronically signed by Jose Valle MD on  01/11/2021 11:14:44 AM                             Subjective:        CC: Russ is a 80 year old White male.  This is a follow-up visit.  6 months; PT NO LONGER TAKING LASIX OR ASPIRIN, AMLODIPINE IS NOW 5MG DAILY AND DOXASOZIN IS 4MG DAILY; pt says bp has been running high for past 10 days         HPI:       Mr. Roberts here today follow-up on his chronic health issues.  Since his last visit he has had some significant health issues.  He ended up in the hospital with atrial fib rapid ventricular response.  Ended up with cardiac ablation and had post ablation escape requiring rehospitalization.  Was placed on amiodarone and finally says he is not experiencing any palpitations anymore.  During the hospital stay they also decreased his amlodipine to 5 mg.  His blood pressure has been running a little on the elevated side (see blood pressure log).  He has a follow-up appointment with Dr. Salvador his cardiologist tomorrow.  Dr. Olivier has been his electrophysiologist. He reports that both hospitalizations ended up giving him magnesium.      He has a pre-existing history of hypothyroidism.  I informed him that the amiodarone can have effects on thyroid so we will need to be sure to keep monitoring that on a periodic basis.      As far as his history of prostate cancer he has been doing well.  Continues to follow with urology.  Last note I have was from June of last year and they said suggested repeating PSA in 6 months which he does not think has been done      As far as his ongoing osteoarthritis in the knees and the hemarthrosis that he had says that he is not having any pain currently and functionally he is doing pretty well    ROS:     CONSTITUTIONAL:   Negative for chills, fatigue and fever.      CARDIOVASCULAR:  Negative for chest pain, orthopnea, paroxysmal nocturnal dyspnea and pedal edema.      RESPIRATORY:  Negative for dyspnea and cough.      GASTROINTESTINAL:  Negative for abdominal pain, heartburn, constipation, diarrhea, and stool changes.      GENITOURINARY:  Negative for dysuria and polyuria.      PSYCHIATRIC:  Negative for anxiety and depression.          Past Medical History / Family History / Social History:         Last Reviewed on 1/11/2021 10:56 AM by Jose Valle    Past Medical History:             PAST MEDICAL HISTORY         Positive for    Hemarthosis 2019;         CURRENT MEDICAL PROVIDERS:    Cardiologist: Jocelynn and EP doc is Dr Olivier    Dermatologist: [facility]    Gastroenterologist: Ebony    Ophthalmologist: [facility]    [facility]opedist: [facility] [place]    Urologist: [facility]         PREVENTIVE HEALTH MAINTENANCE             COLORECTAL CANCER SCREENING: Up to date (colonoscopy q10y; sigmoidoscopy q5y; Cologuard q3y) was last done 8-10-18, Results are in chart; colonoscopy with normal results; 8/10/18     EYE EXAM: was last done 6/2020     PSA: was last done 5/1/18         PAST MEDICAL HISTORY         Positive for    Prostate cancer: dx'd in 2015;  s/p XRT; ;         PAST MEDICAL HISTORY         Positive for    Myocardial bridge;     Positive for    Renal Cyst;         CURRENT MEDICAL PROVIDERS:    Cardiologist: Irineo Weber    General Surgeon: Jarred    Ophthalmologist: Tuyet    Orthopedist: Akil         Surgical History:         Cataract Removal: left; 2019;     Biopsy of colonic polyp; '95, '97, '99    Sinus Surg;    Cardiac Cath 8/02-  Myocardial bridge of LAD;    Breast Bx (Dr Collins Barnard);    Prostate Bx Dec 2014, with radiation;     Melanoma removal 6/19- Left neck;    Cardiac Ablasion 11/23/20;     Procedures:    Left rotator cuff ~ 2000         Family History: Has a Twin Brother          Positive for Congestive Heart Failure ( father ), Hypertension ( father; mother ) and Myocardial Infarction ( father ).      Positive for Breast Cancer ( mother ).      Positive for Alzheimer's Disease ( mother ) and Pituitary Adenomy - Dad.          Social History:     Occupation: Farmer     Marital Status:      Children: 3 children         Tobacco/Alcohol/Supplements:     Last Reviewed on 1/11/2021 09:51 AM by Sonya Gonzalez    Tobacco: He has never smoked.          Alcohol:  Does not drink alcohol and never has.          Substance Abuse History:     Last Reviewed on 9/11/2020 12:43 PM by Jose Rollins        Mental Health History:     Last Reviewed on 9/11/2020 12:43 PM by Jose Rollins        Communicable Diseases (eg STDs):     Last Reviewed on 9/11/2020 12:43 PM by Jose Rollins        Allergies:     Last Reviewed on 1/11/2021 09:51 AM by Sonya Gonzalez    hydrochlorothiazide: Itching of skin  (Adverse Reaction)    Reglan:      Bystolic:      carvedilol:      spironolactone:          Current Medications:     Last Reviewed on 1/11/2021 09:51 AM by Sonya Gonzalez    amLODIPine 10 mg oral tablet [take 1 tablet (10 mg) by oral route once daily]    Lasix 20 mg oral tablet [1 tablet po once a day]    OMEGA Q PLUS     aspirin 81 mg oral tablet, delayed release (enteric coated) [take 1 tablet (81 mg) by oral route once daily]    Multivitamin/Mineral Supplement     Nexium 40 mg oral capsule,delayed release (enteric coated) [1 capsule daily]    Folic Acid/fish oil pill     Vitamin D3 25 mcg (1,000 unit) oral tablet [1 tab daily]    Vitamin B-6 25 mg oral tablet [Take 1 tablet(s) by mouth daily]    wintertime drink, vitamin c, calcium, magnesium     ProBiotic Jr.* powder for mixing [1 packet daily]    Coenzyme Q10 50 mg oral capsule [Take 1 capsule(s) by mouth daily]    Fluticasone Propionate 50 mcg/actuation Intranasal Spray, Suspension [2 spray(s) daily each nare]    levothyroxine 25 mcg oral tablet [TAKE 1  TABLET EVERY DAY]    ELIQUIS 5MG         TAB  [TAKE 1 TABLET BY MOUTH EVERY 12 HOURS]    doxazosin 4 mg oral tablet [one tablet daily]    amiodarone 200 mg oral tablet        Objective:        Vitals:         Current: 1/11/2021 9:53:03 AM    Ht:  5 ft, 9 in;  Wt: 188.6 lbs;  BMI: 27.9T: 97.7 F (oral);  BP: 164/67 mm Hg (left arm, sitting);  P: 54 bpm (left arm (BP Cuff), sitting);  sCr: 1.19 mg/dL;  GFR: 48.93        Exams:     PHYSICAL EXAM:     GENERAL:  well developed and nourished; appropriately groomed; in no apparent distress;     EYES: Nonicteric and with unremarkable lids, iris and pupils;     E/N/T:  normal EACs, TMs, nasal/oral mucosa, teeth, gingiva, and oropharynx;     NECK: carotid exam reveals no bruits;     RESPIRATORY: normal respiratory rate and pattern with no distress; normal breath sounds with no rales, rhonchi, wheezes or rubs;     CARDIOVASCULAR: normal rate; rhythm is regular;  no systolic murmur; No ectopy or irregular rhythm at all today    LYMPHATIC: no enlargement of cervical or facial nodes;     MUSCULOSKELETAL: normal overall tone No pedal edema.;     NEUROLOGIC: No lateralizing deficit.;     PSYCHIATRIC:  appropriate affect and demeanor; normal speech pattern; grossly normal memory;         Assessment:         I10   Essential (primary) hypertension       E03.9   Hypothyroidism, unspecified       I48.0   Paroxysmal atrial fibrillation       Z85.46   Personal history of malignant neoplasm of prostate       M17.0   Bilateral primary osteoarthritis of knee           ORDERS:         Meds Prescribed:       [Refilled] amLODIPine 5 mg oral tablet [take 1 tablet (5 mg) by oral route once daily], #1 (one) tablet, Refills: 0 (zero)         Lab Orders:       FUTURE  Future order to be done at patients convenience  (Send-Out)            41539  COMP - [facility] Comp. Metabolic Panel  (Send-Out)            60567  DP [facility] Lipid Panel  (Send-Out)            FUTURE  Future order to be done at patients  convenience  (Send-Out)            80203  TSH - [facility] TSH  (Send-Out)            FUTURE  Future order to be done at patients convenience  (Send-Out)            39385  PSA - [facility] PSA DIAGNOSTIC  (Send-Out)            12032  MG - [facility] Magnesium, Serum  (Send-Out)                      Plan:         Essential (primary) hypertensionBlood pressure is still little elevated.  I would hold off on making adjustments to his medicine since he is on to be seeing cardiology tomorrow.          FOLLOW-UP TESTING #1: FOLLOW-UP LABORATORY:  Labs to be scheduled in the future include CMP, lipid panel, and Magnesium level.            Prescriptions:       [Refilled] amLODIPine 5 mg oral tablet [take 1 tablet (5 mg) by oral route once daily], #1 (one) tablet, Refills: 0 (zero)           Orders:       FUTURE  Future order to be done at patients convenience  (Send-Out)            72109  COMP - [facility] Comp. Metabolic Panel  (Send-Out)            58522  LPDP - [facility] Lipid Panel  (Send-Out)            49570  MG - [facility] Magnesium, Serum  (Send-Out)              Hypothyroidism, unspecifiedGave him an order for some labs that he can take with him to his appointment tomorrow.  If they end up throwing blood he can have hours added on otherwise he will return here at some point get blood drawn        FOLLOW-UP TESTING #1: FOLLOW-UP LABORATORY:  Labs to be scheduled in the future include TSH.            Orders:       FUTURE  Future order to be done at patients convenience  (Send-Out)            15741  TSH - [facility] TSH  (Send-Out)              Paroxysmal atrial fibrillationContinue on the current medical regimen.  Follow-up with cardiology tomorrow.         Personal history of malignant neoplasm of prostateGet follow-up lab and continue to follow with urology as instructed        FOLLOW-UP TESTING #1: FOLLOW-UP LABORATORY:  Labs to be scheduled in the future include PSA Diagnostic Reason.            Orders:       FUTURE  Future order to be done at patients  convenience  (Send-Out)            07154  PSA - [facility] PSA DIAGNOSTIC  (Send-Out)              Bilateral primary osteoarthritis of kneeFunctionally seems to be doing quite well.            Other Patient Education Handouts:     Dragon transcription disclaimer:        Much of this encounter note is an electronic transcription/translation of spoken language to printed text.  The electronic translation of spoken language may permit erroneous, or at times, nonsensical words or phrases to be inadvertently transcribed.  Although I have reviewed the note for such errors, some may still exist.        Patient Recommendations:        For  Essential (primary) hypertension:            The following laboratory testing has been ordered: metabolic panel, comprehensive lipid panel         For  Hypothyroidism, unspecified:            The following laboratory testing has been ordered: TSH         For  Personal history of malignant neoplasm of prostate:            The following laboratory testing has been ordered:             Charge Capture:         Primary Diagnosis:     I10  Essential (primary) hypertension           Orders:      51526  Office/outpatient visit; established patient, level 4  (In-House)              E03.9  Hypothyroidism, unspecified     I48.0  Paroxysmal atrial fibrillation     Z85.46  Personal history of malignant neoplasm of prostate     M17.0  Bilateral primary osteoarthritis of knee

## 2021-05-18 NOTE — PROGRESS NOTES
"Quincy RobertsJose 1940     Office/Outpatient Visit    Visit Date: Mon, Jul 1, 2019 08:55 am    Provider: Jose Valle MD (Assistant: Olga Contreras MA)    Location: [facility]        Electronically signed by Jose Valle MD on  07/01/2019 09:59:32 AM                             SUBJECTIVE:        CC:     Russ is a 78 year old White male.  This is a follow-up visit.  med refill \"PT STATES NOT TAKING CLONIDINE\"         HPI:         Patient presents with hypertension.  He did not bring his blood pressure diary, but says that pressures have been okay.  He is tolerating the medication well without side effects.  Compliance with treatment has been good.  He checks his blood pressure once or twice a week he says         PHQ-9 Depression Screening: Completed form scanned and in chart; Total Score 0 Alcohol Consumption Screening: Completed form scanned and in chart; Total Score 0     Last time I saw him we did some lab work that revealed some anemia and leukocytopenia.  Repeat lab confirmed the same so we sent him on to the hematologist for evaluation.  We reviewed both office notes from that evaluation.  So far they have not found an etiology of his anemia and leukocytopenia.  But they can continue to follow him on an ongoing basis.  We talked about the possibility of myelodysplasia.     He also tells me he just had stitches taken out from left side of his neck where a melanoma was removed via Mohs surgery.  So far no complications related to that.     ROS:     CONSTITUTIONAL:  Negative for chills, fatigue and fever.      CARDIOVASCULAR:  Negative for chest pain, orthopnea, paroxysmal nocturnal dyspnea and pedal edema.      RESPIRATORY:  Negative for dyspnea and cough.      GASTROINTESTINAL:  Negative for abdominal pain, heartburn, constipation, diarrhea, and stool changes.      GENITOURINARY:  Negative for dysuria and polyuria.      PSYCHIATRIC:  Negative for anxiety and depression.          " PMH/FMH/SH:     Last Reviewed on 11/02/2018 09:02 AM by Jose Valle    Past Medical History:             PAST MEDICAL HISTORY             CURRENT MEDICAL PROVIDERS:    Cardiologist: Jocelynn    Dermatologist: Brandt    Gastroenterologist: Ebony    Ophthalmologist: [facility]    Orthopedist: [facility]    Urologist: [facility]             ADVANCE DIRECTIVES: Living will [facility]             COLORECTAL CANCER SCREENING: Up to date (colonoscopy q10y; sigmoidoscopy q5y; Cologuard q3y) was last done 8-10-18, Results are in chart; colonoscopy with normal results     EYE EXAM: was last done 12/2016     PSA: was last done 5/1/18         PAST MEDICAL HISTORY         Positive for    Prostate cancer: dx'd in 2015;  s/p XRT; ;         Surgical History:         Biopsy of colonic polyp; '95, '97, '99    Sinus Surg;    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      Positive for Breast Cancer ( mother ).      Positive for Alzheimer's Disease ( mother ) and Pituitary Adenomy - Dad.          Social History:     Occupation: Farmer     Marital Status:      Children: 3 children         Tobacco/Alcohol/Supplements:     Last Reviewed on 7/01/2019 08:59 AM by Olga Contreras    Tobacco: He has never smoked.          Alcohol:  Does not drink alcohol and never has.              Allergies:     Last Reviewed on 7/01/2019 08:59 AM by Olga Contreras    Reglan:        Current Medications:     Last Reviewed on 7/01/2019 09:02 AM by Olga Contreras    Cardura 8mg Tablet Take 1/2 tablet(s) by mouth at hs     Amlodipine  5mg Tablet  Take 1 tablet in the morning and one-half tabet qhs     Clonidine HCl 0.1mg Tablet 1 tab po q 4 hrs prn systolic blood pressure greater than 180     Nexium 20mg Capsules, Delayed Release Take 1 capsule(s) by mouth BID     Vitamin D3 1,000IU Tablet 1 tab daily     Multivitamin/Mineral Supplement     Aspirin 81mg Tablets, Enteric Coated Take 1 tablet(s) by mouth qam     ProBiotic Jr.* powder for mixing 1 packet daily     wintertime drink, vitamin c, calcium, magnesium     Vitamin B6 25mg Tablet Take 1 tablet(s) by mouth daily     Folic Acid/fish oil pill         OBJECTIVE:        Vitals:         Current: 7/1/2019 9:04:00 AM    Ht:  5 ft, 9 in;  Wt: 191.2 lbs;  BMI: 28.2    T: 97.98 F (oral);  BP: 136/62 mm Hg (right arm, sitting);  P: 81 bpm (right arm (BP Cuff), sitting);  sCr: 1.05 mg/dL;  GFR: 57.57        Exams:     PHYSICAL EXAM:     GENERAL:  well developed and nourished; appropriately groomed; in no apparent distress;     EYES: Nonicteric and with unremarkable lids, iris and pupils;     E/N/T:  normal EACs, TMs, nasal/oral mucosa, teeth, gingiva, and oropharynx;     NECK: carotid exam reveals no bruits;     RESPIRATORY: normal respiratory rate and pattern with no distress; normal breath sounds with no rales, rhonchi, wheezes or rubs;     CARDIOVASCULAR: normal rate; rhythm is regular;  no systolic murmur;     LYMPHATIC: no enlargement of cervical or facial nodes;     SKIN: He does have weathered skin.  Incision posterior to the left inferior ear area is well-healed without any evidence of infection.  No palpable lymph nodes in the vicinity.;     MUSCULOSKELETAL: normal overall tone No pedal edema.;     NEUROLOGIC: No lateralizing deficit.;     PSYCHIATRIC:  appropriate affect and demeanor; normal speech pattern; grossly normal memory;         ASSESSMENT           401.1   I10  Hypertension              DDx:     V79.0   Z13.89  Screening for depression              DDx:     285.9   D64.9  Unspecified anemia               DDx:     288.50   D72.819  Leukocytopenia, unspecified              DDx:     172.4   C43.4   D03.4  Malignant melanoma of scalp and neck              DDx:         ORDERS:         Meds Prescribed:       Refill of: Cardura (Doxazosin Mesylate) 8mg Tablet Take 1/2 tablet(s) by mouth at hs  #45 ([place]) tablet(s) Refills: 1       Refill of: Amlodipine  5mg Tablet Take 1 tablet in the morning and one-half tabet qhs  #180 ([place]) tablet(s) Refills: 1         Lab Orders:       FUTURE  Future order to be done at patients convenience  (Send-Out)         475[address]  [facility] Basic Metabolic Panel  (Send-Out)           Other Orders:         Depression screen negative  (In-House)                   PLAN: I did remind him he is due for the second hepatitis A vaccine          Hypertension He just had lab work at the hematologist that looked okay.  They did not however check his renal function.  Some disc and give him an order to take with him he can present at the time of his next blood draw for the hematologist.         FOLLOW-UP TESTING #1: FOLLOW-UP LABORATORY:  Labs to be scheduled in the future include BMP.            Prescriptions:       Refill of: Cardura (Doxazosin Mesylate) 8mg Tablet Take 1/2 tablet(s) by mouth at hs  #45 ([place]) tablet(s) Refills: 1       Refill of: Amlodipine  5mg Tablet Take 1 tablet in the morning and one-half tabet qhs  #180 ([place] [address] [place]) tablet(s) Refills: 1           Orders:       FUTURE  Future order to be done at patients convenience  (Send-Out)         [address]  [facility] Basic Metabolic Panel  (Send-Out)            Screening for depression     MIPS Negative Depression Screen           Orders:         Depression screen negative  (In-House)            Unspecified anemia Continue follow-up with hematologist per their recommendation          Leukocytopenia, unspecified Continue follow-up with hematologist per their recommendation          Malignant  melanoma of scalp and neck Continue to follow with Dr. Carlton his dermatologist on a regular basis             Patient Recommendations:    Dragon transcription disclaimer:        Much of this encounter note is an electronic transcription/translation of spoken language to printed text.  The electronic translation of spoken language may permit erroneous, or at times, nonsensical words or phrases to be inadvertently transcribed.  Although I have reviewed the note for such errors, some may still exist.         For  Hypertension:             The following laboratory testing has been ordered: metabolic panel, basic             CHARGE CAPTURE           **Please note: ICD descriptions below are intended for billing purposes only and may not represent clinical diagnoses**        Primary Diagnosis:         401.1 Hypertension            I10    Essential (primary) hypertension              Orders:          06004   Office/outpatient visit; established patient, level 4  (In-House)           V79.0 Screening for depression            Z13.89    Encounter for screening for other disorder              Orders:             Depression screen negative  (In-House)           285.9 Unspecified anemia            D64.9    Anemia, unspecified    288.50 Leukocytopenia, unspecified            D72.819    Decreased white blood cell count, unspecified    172.4 Malignant melanoma of scalp and neck            C43.4    Malignant melanoma of scalp and neck           D03.4    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

## 2021-06-02 ENCOUNTER — OFFICE VISIT (OUTPATIENT)
Dept: CARDIOLOGY | Facility: CLINIC | Age: 81
End: 2021-06-02

## 2021-06-02 VITALS
HEART RATE: 55 BPM | HEIGHT: 70 IN | SYSTOLIC BLOOD PRESSURE: 130 MMHG | WEIGHT: 186 LBS | BODY MASS INDEX: 26.63 KG/M2 | DIASTOLIC BLOOD PRESSURE: 64 MMHG

## 2021-06-02 DIAGNOSIS — E78.2 MIXED HYPERLIPIDEMIA: ICD-10-CM

## 2021-06-02 DIAGNOSIS — I10 ESSENTIAL HYPERTENSION: ICD-10-CM

## 2021-06-02 DIAGNOSIS — I48.0 PAF (PAROXYSMAL ATRIAL FIBRILLATION) (HCC): Primary | ICD-10-CM

## 2021-06-02 PROCEDURE — 93000 ELECTROCARDIOGRAM COMPLETE: CPT | Performed by: INTERNAL MEDICINE

## 2021-06-02 PROCEDURE — 99214 OFFICE O/P EST MOD 30 MIN: CPT | Performed by: INTERNAL MEDICINE

## 2021-06-02 NOTE — PROGRESS NOTES
Date of Office Visit: 2021  Encounter Provider: Elmira Cabezas MD  Place of Service: [facility] CARDIOLOGY  Patient Name: Quincy Roberts  :1940      Patient ID:  Quincy Roberts is a 80 y.o. male is here for  followup for atrial fibrillation, status post ablation.        History of Present Illness    I first saw in the chest pain unit at  [facility].  He presented there with exertional chest pain, short-windedness,  and fatigue.  He went to the emergency department and his blood pressure was very high at  about 200/120.  He had not had that issue in quite some time.  Because of his chest pain,  we did an ischemic workup.  He ruled out for a myocardial infarction and had a stress  nuclear perfusion study done on January 10, 2013, which showed no ischemia.  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  During his hospitalization, he also had a lipid panel done on  January 10, 2013, which showed triglycerides of 59, HDL of 31, LDL of 82, and total  cholesterol of 125.     He did have some left upper quadrant pain and for  that he saw Dr. Stern.  He subsequently had an EGD.  He also had a CT of his abdomen  and pelvis.  The EGD looked fine.  The CT of the abdomen and pelvis suggested diverticular  disease.  He then had a colonoscopy performed and 7 polyps were removed, and I think they  have been treating him for the diverticular disease. His last EGD was 2017 and was normal.         He was diagnosed with prostate cancer in 2015. He sees Dr. Garcia  at [facility]. He underwent radiation therapy for that and it has helped, not only the  prostatic hypertrophy, but the prostate cancer.      In 2019, he was hospitalized for left knee swelling and found to have a large left knee effusion.  He was treated with IV vancomycin and underwent left knee arthrocentesis with  110 mL bloody synovial fluid that was removed.     He was unable to tolerate the hydrochlorothiazide due to itching so it was discontinued.       Echo done 2/27/2020 showed ejection fraction 61% with borderline dilated left atrium, normal valvular structure and function.  Laboratory values in 2/14/2020 show slightly elevated TSH with normal total T4 and normal T uptake.  He was complaining of decreased energy on Bystolic April 2020.  His heart rate is running 46-42.  He then stopped the Bystolic     Echocardiogram 11/27/2020 shows ejection fraction 65% with normal saline contrast study and mild tricuspid insufficiency.  Diastolic function normal.     He continued to have intermittent heart racing as well as slowing.  This was felt to be due to paroxysmal atrial fibrillation with rapid ventricular response. He underwent pulmonary vein isolation and ablation for cavotricuspid isthmus dependent atrial flutter on 11/23/2020.  He then represented to the hospital 11/29/2020 with palpitations.  He was placed on amiodarone for intermittent atrial fibrillation and dismissed to home.  He saw Dr. Olivier in follow-up on 12/23/2020 who recommended continuation of amiodarone and Eliquis.    His amiodarone has now been discontinued.  He remains on Eliquis.     Labs on 3/15/2021 showed TSH 13, free T4 1.4, hemoglobin 11.9, white count 3.28, platelets 180.  CMP done 1/12/1 showed glucose 129, otherwise normal CMP.  Total cholesterol on 1/12/2021 was 114, HDL 47, LDL 53, triglycerides 72, magnesium 2.  He has had no further atrial fibrillation.  His energy is much better.  His blood pressure is under better control.  He has no lower extremity edema.  He has no orthopnea.  He has no exertional chest pains or pressure.  He had no fevers, chills or cough.  His Synthroid was increased in March because of an elevated TSH.    Past Medical History:   Diagnosis Date   • A-fib (CMS/HCC)    • Anemia    • Diverticulosis    • GERD  (gastroesophageal reflux disease)    • Health care maintenance    • History of colon polyps    • History of jaundice     Childhood   • History of prostate cancer 2014   • History of radiation therapy    • Hyperlipidemia    • Hypertension    • Kidney stones    • Osteoarthritis    • Prostate cancer (CMS/HCC) 2014   • Pyogenic arthritis of left knee joint (CMS/HCC)          Past Surgical History:   Procedure Laterality Date   • CARDIAC CATHETERIZATION     • CARDIAC ELECTROPHYSIOLOGY PROCEDURE N/A 11/23/2020    Procedure: Ablation atrial fibrillation;  Surgeon: Brandyn Olivier MD;  Location: [facility] CATH INVASIVE LOCATION;  Service: Cardiovascular;  Laterality: N/A;   • COLONOSCOPY N/A 8/10/2018    Procedure: COLONOSCOPY INTO CECUM AND TERMINAL ILEUM;  Surgeon: Valentino Stern MD;  Location: [facility] ENDOSCOPY;  Service: Gastroenterology   • ENDOSCOPY N/A 8/2/2017    Procedure: ESOPHAGOGASTRODUODENOSCOPY WITH COLD BIOPSIES;  Surgeon: Valentino PEÑA MD;  Location: [facility] ENDOSCOPY;  Service:    • HERNIA REPAIR  2010   • ROTATOR CUFF REPAIR Left 2003   • TOTAL HIP ARTHROPLASTY Right 2011       Current Outpatient Medications on File Prior to Visit   Medication Sig Dispense Refill   • Acetaminophen (TYLENOL PO) Take 1 tablet by mouth As Needed.     • amLODIPine (NORVASC) 5 MG tablet Take 1 tablet by mouth Daily. 90 tablet 3   • apixaban (ELIQUIS) 5 MG tablet tablet Take 1 tablet by mouth Every 12 (Twelve) Hours. 180 tablet 3   • Cholecalciferol (VITAMIN D3 PO) Take 1,000 mg by mouth Daily.     • coenzyme Q10 50 MG capsule capsule Take 50 mg by mouth Daily.     • doxazosin (CARDURA) 4 MG tablet Take 1.5 tablets by mouth Every Night. 135 tablet 3   • esomeprazole (NEXIUM) 40 MG capsule Take 40 mg by mouth Every Morning Before Breakfast.     • fluticasone (FLONASE) 50 MCG/ACT nasal spray 2 sprays into the nostril(s) as directed by provider As Needed.     • Lactobacillus (PROBIOTIC ACIDOPHILUS PO) Take 1 tablet  "by mouth Daily.     • levothyroxine (SYNTHROID, LEVOTHROID) 50 MCG tablet Take 100 mcg by mouth Daily.     • losartan (COZAAR) 50 MG tablet Take 1 tablet by mouth 2 (Two) Times a Day. 180 tablet 3   • Omega-3 Fatty Acids (FISH OIL) 1000 MG capsule capsule Take 1,000 mg by mouth Daily With Breakfast. Omega Q plus       No current facility-administered medications on file prior to visit.       Social History     Socioeconomic History   • Marital status:      Spouse name: Kassidy   • Number of children: Not on file   • Years of education: High School   • Highest education level: Not on file   Tobacco Use   • Smoking status: Never Smoker   • Smokeless tobacco: Never Used   Vaping Use   • Vaping Use: Never used   Substance and Sexual Activity   • Alcohol use: No     Comment: caffeine use: 1-2 cups daily: decaf    • Drug use: No   • Sexual activity: Defer           ROS    Procedures    ECG 12 Lead    Date/Time: 6/2/2021 8:48 AM  Performed by: Elmira Cabezas MD  Authorized by: Elmira Cabezas MD   Comparison: compared with previous ECG   Similar to previous ECG  Rhythm: sinus rhythm    Clinical impression: normal ECG                Objective:      Vitals:    06/02/21 0837   BP: 130/64   BP Location: Left arm   Pulse: 55   Weight: 84.4 kg (186 lb)   Height: 177.8 cm (70\")     Body mass index is 26.69 kg/m².    Vitals reviewed.   Constitutional:       General: Not in acute distress.     Appearance: Well-developed. Not diaphoretic.   Eyes:      General: No scleral icterus.     Conjunctiva/sclera: Conjunctivae normal.   HENT:      Head: Normocephalic and atraumatic.   Neck:      Thyroid: No thyromegaly.      Vascular: No carotid bruit or JVD.      Lymphadenopathy: No cervical adenopathy.   Pulmonary:      Effort: Pulmonary effort is normal. No respiratory distress.      Breath sounds: Normal breath sounds. No wheezing. No rhonchi. No rales.   Chest:      Chest wall: Not tender to palpatation.   Cardiovascular: "      Normal rate. Regular rhythm.      Murmurs: There is no murmur.      No gallop.   Pulses:     Intact distal pulses.   Edema:     Peripheral edema absent.   Abdominal:      General: Bowel sounds are normal. There is no distension or abdominal bruit.      Palpations: Abdomen is soft. There is no abdominal mass.      Tenderness: There is no abdominal tenderness.   Musculoskeletal:         General: No deformity.      Extremities: No clubbing present.     Cervical back: Neck supple. Skin:     General: Skin is warm and dry. There is no cyanosis.      Coloration: Skin is not pale.      Findings: No rash.   Neurological:      Mental Status: Alert and oriented to person, place, and time.      Cranial Nerves: No cranial nerve deficit.   Psychiatric:         Judgment: Judgment normal.         Lab Review:       Assessment:      Diagnosis Plan   1. PAF (paroxysmal atrial fibrillation) (CMS/HCC)     2. Essential hypertension     3. Mixed hyperlipidemia       1. Non-cardiac chest pain. Normal stress nuclear perfusion study done January 2013.  2. Hypertension, controlled. Goal 120/80.   3. History of renal cyst, stable.  4. Hyperlipidemia in the form of low HDL.   5. 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   6. Stage 1 prostate cancer 2/2015, s/p radiation with Dr. Burns.  7. h/o PAF with RVR.  On apixaban 5 mg twice daily.  s/p ablation 11/23/20.  No further atrial fibrillation and is off amiodarone.  8.  Hypothyroidism with elevated TSH, levothyroxine increased in March 2021, due to repeat labs in the next 2 weeks.     Plan:       See mateo in 6 months.  stop Eliquis as normal echocardiogram and no further atrial fibrillation.

## 2021-06-07 DIAGNOSIS — D64.9 ANEMIA, UNSPECIFIED TYPE: ICD-10-CM

## 2021-06-07 DIAGNOSIS — I10 HYPERTENSION, ESSENTIAL: Primary | ICD-10-CM

## 2021-06-11 ENCOUNTER — LAB (OUTPATIENT)
Dept: LAB | Facility: HOSPITAL | Age: 81
End: 2021-06-11

## 2021-06-11 DIAGNOSIS — D64.9 ANEMIA, UNSPECIFIED TYPE: ICD-10-CM

## 2021-06-11 LAB
RETICS # AUTO: 0.08 10*6/MM3 (ref 0.02–0.13)
RETICS/RBC NFR AUTO: 2.69 % (ref 0.7–1.9)

## 2021-06-11 PROCEDURE — 85045 AUTOMATED RETICULOCYTE COUNT: CPT

## 2021-06-11 PROCEDURE — 36415 COLL VENOUS BLD VENIPUNCTURE: CPT

## 2021-06-24 ENCOUNTER — TELEPHONE (OUTPATIENT)
Dept: FAMILY MEDICINE CLINIC | Age: 81
End: 2021-06-24

## 2021-06-24 NOTE — TELEPHONE ENCOUNTER
Caller: Quincy Roberts    Relationship: Self    Best call back number: [phone]     What test was performed: BLOOD WORK     When was the test performed: 6/11/21    Where was the test performed: [facility]     Additional notes: HUB WAS UNABLE TO WARM TRANSFER

## 2021-06-28 ENCOUNTER — OFFICE VISIT (OUTPATIENT)
Dept: ORTHOPEDIC SURGERY | Facility: CLINIC | Age: 81
End: 2021-06-28

## 2021-06-28 VITALS — BODY MASS INDEX: 26.63 KG/M2 | WEIGHT: 186 LBS | HEIGHT: 70 IN | TEMPERATURE: 98 F

## 2021-06-28 DIAGNOSIS — M46.1 SACROILIITIS (HCC): ICD-10-CM

## 2021-06-28 DIAGNOSIS — R52 PAIN: Primary | ICD-10-CM

## 2021-06-28 PROCEDURE — 73501 X-RAY EXAM HIP UNI 1 VIEW: CPT | Performed by: NURSE PRACTITIONER

## 2021-06-28 PROCEDURE — 99213 OFFICE O/P EST LOW 20 MIN: CPT | Performed by: NURSE PRACTITIONER

## 2021-06-28 RX ORDER — LOSARTAN POTASSIUM 25 MG/1
TABLET ORAL
COMMUNITY
Start: 2021-04-21 | End: 2021-07-01

## 2021-06-28 RX ORDER — LEVOTHYROXINE SODIUM 0.1 MG/1
TABLET ORAL
COMMUNITY
Start: 2021-06-22 | End: 2021-08-20

## 2021-06-28 NOTE — PROGRESS NOTES
Patient: Quincy Roberts  YOB: 1940 80 y.o. male  Medical Record Number: [id number]    Chief Complaints:   Chief Complaint   Patient presents with   • Right Hip - Establish Care, Pain       History of Present Illness:Quincy Roberts is a 80 y.o. male who presents with new complaints of right lower back pain.  The patient reports that it started about 3 weeks ago, denies any injury.  Describes as a moderate throbbing aching type pain worse with walking better with rest.  He denies any pain going down his leg.  Denies any right groin pain.  Denies any fever or chills.  He had previous right total hip replacement in 2011.    Allergies:   Allergies   Allergen Reactions   • Metoclopramide Anaphylaxis   • Carvedilol Unknown - Low Severity     Intolerant   • Hctz [Hydrochlorothiazide] Itching   • Bystolic [Nebivolol Hcl] Other (See Comments)     Bradycardia, fatigue, decreased energy    • Spironolactone Rash     Rash and breast tenderness       Medications:   Current Outpatient Medications   Medication Sig Dispense Refill   • Acetaminophen (TYLENOL PO) Take 1 tablet by mouth As Needed.     • amLODIPine (NORVASC) 5 MG tablet Take 1 tablet by mouth Daily. 90 tablet 3   • Cholecalciferol (VITAMIN D3 PO) Take 1,000 mg by mouth Daily.     • coenzyme Q10 50 MG capsule capsule Take 50 mg by mouth Daily.     • doxazosin (CARDURA) 4 MG tablet Take 1.5 tablets by mouth Every Night. 135 tablet 3   • esomeprazole (NEXIUM) 40 MG capsule Take 40 mg by mouth Every Morning Before Breakfast.     • Lactobacillus (PROBIOTIC ACIDOPHILUS PO) Take 1 tablet by mouth Daily.     • levothyroxine (SYNTHROID, LEVOTHROID) 100 MCG tablet      • losartan (COZAAR) 50 MG tablet Take 1 tablet by mouth 2 (Two) Times a Day. 180 tablet 3   • Omega-3 Fatty Acids (FISH OIL) 1000 MG capsule capsule Take 1,000 mg by mouth Daily With Breakfast. Omega Q plus     • apixaban (ELIQUIS) 5 MG tablet tablet Take 1 tablet by mouth Every 12 (Twelve) Hours. 180  "tablet 3   • fluticasone (FLONASE) 50 MCG/ACT nasal spray 2 sprays into the nostril(s) as directed by provider As Needed.     • levothyroxine (SYNTHROID, LEVOTHROID) 50 MCG tablet Take 100 mcg by mouth Daily.     • losartan (COZAAR) 25 MG tablet        No current facility-administered medications for this visit.         The following portions of the patient's history were reviewed and updated as appropriate: allergies, current medications, past family history, past medical history, past social history, past surgical history and problem list.    Review of Systems:   A 14 point review of systems was performed. All systems negative except pertinent positives/negative listed in HPI above    Physical Exam:   Vitals:    06/28/21 0938   Temp: 98 °F (36.7 °C)   Weight: 84.4 kg (186 lb)   Height: 177.8 cm (70\")       General: A and O x 3, ASA, NAD    SCLERA:    Normal    DENTITION:   Normal  Skin clear no unusual lesions noted  Right hip patient is nontender palpation is excellent range of motion with no instability calf is soft and nontender       Radiology:  Xrays 2 views of right hip ordered and reviewed today secondary to pain show well-placed well-positioned right total hip replacement.  Compared to views are unchanged    Assessment/Plan: Right sacroiliitis  Status post right KHALIDA stable    Patient I discussed options, we will proceed with right SI joint fluoroscopy guided cortisone injection, physical therapy, Tylenol as needed, and I will see him back as needed      Anyi Mandujano, APRN  6/28/2021  "

## 2021-06-30 ENCOUNTER — TELEPHONE (OUTPATIENT)
Dept: ORTHOPEDIC SURGERY | Facility: CLINIC | Age: 81
End: 2021-06-30

## 2021-06-30 NOTE — TELEPHONE ENCOUNTER
Provider: RICHY NIEVES   Caller: MICHELLE MEDLEY  Relationship to Patient: SELF     Phone Number: [phone]    PATIENT CALLED TO INFORM US THAT HE IS NO LONGER TAKING ELIQUIS AND HIS RECENT VISIT PAPERWORK SHOWS HE IS STILL TAKING IT. HE WANTS THIS TAKEN OFF. HE HAS BEEN OFF OF ELIQUIS SINCE 6/3/21  PLEASE ADVISE

## 2021-07-01 ENCOUNTER — HOSPITAL ENCOUNTER (OUTPATIENT)
Dept: GENERAL RADIOLOGY | Facility: HOSPITAL | Age: 81
End: 2021-07-01

## 2021-07-01 ENCOUNTER — HOSPITAL ENCOUNTER (OUTPATIENT)
Dept: GENERAL RADIOLOGY | Facility: HOSPITAL | Age: 81
Discharge: HOME | End: 2021-07-01

## 2021-07-01 ENCOUNTER — HOSPITAL ENCOUNTER (OUTPATIENT)
Dept: PAIN MEDICINE | Facility: HOSPITAL | Age: 81
Discharge: HOME | End: 2021-07-01

## 2021-07-01 ENCOUNTER — ANESTHESIA EVENT (OUTPATIENT)
Dept: PAIN MEDICINE | Facility: HOSPITAL | Age: 81
End: 2021-07-01

## 2021-07-01 ENCOUNTER — ANESTHESIA (OUTPATIENT)
Dept: PAIN MEDICINE | Facility: HOSPITAL | Age: 81
End: 2021-07-01

## 2021-07-01 VITALS
BODY MASS INDEX: 28.2 KG/M2 | WEIGHT: 190.4 LBS | HEIGHT: 69 IN | TEMPERATURE: 98.2 F | HEART RATE: 65 BPM | DIASTOLIC BLOOD PRESSURE: 64 MMHG | SYSTOLIC BLOOD PRESSURE: 140 MMHG

## 2021-07-01 VITALS
DIASTOLIC BLOOD PRESSURE: 63 MMHG | HEIGHT: 69 IN | SYSTOLIC BLOOD PRESSURE: 120 MMHG | WEIGHT: 186 LBS | BODY MASS INDEX: 27.55 KG/M2 | TEMPERATURE: 97.6 F | HEART RATE: 71 BPM

## 2021-07-01 VITALS
OXYGEN SATURATION: 95 % | HEART RATE: 57 BPM | TEMPERATURE: 97.5 F | HEIGHT: 70 IN | SYSTOLIC BLOOD PRESSURE: 138 MMHG | DIASTOLIC BLOOD PRESSURE: 72 MMHG | WEIGHT: 184 LBS | BODY MASS INDEX: 26.34 KG/M2 | RESPIRATION RATE: 16 BRPM

## 2021-07-01 VITALS
SYSTOLIC BLOOD PRESSURE: 140 MMHG | HEIGHT: 69 IN | BODY MASS INDEX: 27.13 KG/M2 | HEART RATE: 58 BPM | DIASTOLIC BLOOD PRESSURE: 57 MMHG | TEMPERATURE: 97.7 F | WEIGHT: 183.2 LBS

## 2021-07-01 VITALS
BODY MASS INDEX: 28.32 KG/M2 | HEART RATE: 81 BPM | DIASTOLIC BLOOD PRESSURE: 62 MMHG | SYSTOLIC BLOOD PRESSURE: 136 MMHG | HEIGHT: 69 IN | WEIGHT: 191.2 LBS | TEMPERATURE: 98 F

## 2021-07-01 DIAGNOSIS — M53.3 CHRONIC RIGHT SACROILIAC JOINT PAIN: ICD-10-CM

## 2021-07-01 DIAGNOSIS — G89.29 CHRONIC RIGHT SACROILIAC JOINT PAIN: ICD-10-CM

## 2021-07-01 DIAGNOSIS — M46.1 SACROILIITIS (HCC): ICD-10-CM

## 2021-07-01 PROCEDURE — 0 IOPAMIDOL 41 % SOLUTION: Performed by: ANESTHESIOLOGY

## 2021-07-01 PROCEDURE — 25010000002 METHYLPREDNISOLONE PER 80 MG: Performed by: ANESTHESIOLOGY

## 2021-07-01 PROCEDURE — 77003 FLUOROGUIDE FOR SPINE INJECT: CPT

## 2021-07-01 RX ORDER — FENTANYL CITRATE 50 UG/ML
50 INJECTION, SOLUTION INTRAMUSCULAR; INTRAVENOUS AS NEEDED
Status: DISCONTINUED | OUTPATIENT
Start: 2021-07-01 | End: 2021-07-02 | Stop reason: HOSPADM

## 2021-07-01 RX ORDER — METHYLPREDNISOLONE ACETATE 80 MG/ML
80 INJECTION, SUSPENSION INTRA-ARTICULAR; INTRALESIONAL; INTRAMUSCULAR; SOFT TISSUE ONCE
Status: COMPLETED | OUTPATIENT
Start: 2021-07-01 | End: 2021-07-01

## 2021-07-01 RX ORDER — MIDAZOLAM HYDROCHLORIDE 1 MG/ML
1 INJECTION INTRAMUSCULAR; INTRAVENOUS AS NEEDED
Status: DISCONTINUED | OUTPATIENT
Start: 2021-07-01 | End: 2021-07-02 | Stop reason: HOSPADM

## 2021-07-01 RX ORDER — BUPIVACAINE HYDROCHLORIDE 2.5 MG/ML
10 INJECTION, SOLUTION EPIDURAL; INFILTRATION; INTRACAUDAL ONCE
Status: COMPLETED | OUTPATIENT
Start: 2021-07-01 | End: 2021-07-01

## 2021-07-01 RX ORDER — LIDOCAINE HYDROCHLORIDE 10 MG/ML
1 INJECTION, SOLUTION INFILTRATION; PERINEURAL ONCE AS NEEDED
Status: DISCONTINUED | OUTPATIENT
Start: 2021-07-01 | End: 2021-07-02 | Stop reason: HOSPADM

## 2021-07-01 RX ORDER — SODIUM CHLORIDE 0.9 % (FLUSH) 0.9 %
1-10 SYRINGE (ML) INJECTION AS NEEDED
Status: DISCONTINUED | OUTPATIENT
Start: 2021-07-01 | End: 2021-07-02 | Stop reason: HOSPADM

## 2021-07-01 RX ADMIN — METHYLPREDNISOLONE ACETATE 80 MG: 80 INJECTION, SUSPENSION INTRA-ARTICULAR; INTRALESIONAL; INTRAMUSCULAR; SOFT TISSUE at 10:23

## 2021-07-01 RX ADMIN — BUPIVACAINE HYDROCHLORIDE 10 ML: 2.5 INJECTION, SOLUTION EPIDURAL; INFILTRATION; INTRACAUDAL at 10:23

## 2021-07-01 RX ADMIN — IOPAMIDOL 10 ML: 408 INJECTION, SOLUTION INTRATHECAL at 10:23

## 2021-07-01 NOTE — H&P
[facility]    History and Physical    Patient Name: Quincy Roberts  :  1940  MRN:  [id number]  Date of Admission: 2021    Subjective     Patient is a 80 y.o. male presents with chief complaint of chronic low back, hips: right and buttock pain.  Onset of symptoms was gradual starting several months ago.  Symptoms are associated/aggravated by nothing in particular or activity. Symptoms improve with nothing    The following portions of the patients history were reviewed and updated as appropriate: current medications, allergies, past medical history, past surgical history, past family history, past social history and problem list    He reports low back pain that radiates into his right buttock and sometimes down the posterior aspect of his leg.  This is been going on for several months.  He had previous right total hip arthroplasty about 10 years ago and is done very well since that time.  Is been evaluated by orthopedics and they feel that the hip looks very good from a radiologic standpoint.  We have been asked to perform sacroiliac joint injection.  He does have some tenderness over the right buttock area.  He does not have any recent x-rays of his spine.  He does say prior to his hip replacement he did have what he believes were epidural steroid injections and they were not helpful.  I did explain to him that frequently this hip pain can be mimicked by radicular symptoms from the spine, and hip arthritis and sacroiliac pain in differentiating between these entities can be very difficult.  They also can occur simultaneously which will further complicate treatment.    Today we will perform a right sacroiliac joint injection, if he does well with that then we can be fairly comfortable that the likely source of the pain.  If he gets no relief from that we may wish to consider epidural steroid injections, he does not have any recent spine imaging however I do feel like probably it would be reasonable to  go and try this empirically as his symptoms are very consistent with sciatica as well.            Objective     Past Medical History:   Past Medical History:   Diagnosis Date   • A-fib (CMS/HCC)    • Anemia    • Diverticulosis    • GERD (gastroesophageal reflux disease)    • Health care maintenance    • History of colon polyps    • History of jaundice     Childhood   • History of prostate cancer 2014   • History of radiation therapy    • Hyperlipidemia    • Hypertension    • Kidney stones    • Low back pain    • Osteoarthritis    • Prostate cancer (CMS/HCC) 2014   • Pyogenic arthritis of left knee joint (CMS/HCC)      Past Surgical History:   Past Surgical History:   Procedure Laterality Date   • CARDIAC CATHETERIZATION     • CARDIAC ELECTROPHYSIOLOGY PROCEDURE N/A 11/23/2020    Procedure: Ablation atrial fibrillation;  Surgeon: Brandyn Olivier MD;  Location: [facility] CATH INVASIVE LOCATION;  Service: Cardiovascular;  Laterality: N/A;   • COLONOSCOPY N/A 8/10/2018    Procedure: COLONOSCOPY INTO CECUM AND TERMINAL ILEUM;  Surgeon: Valentino Stern MD;  Location: [facility] ENDOSCOPY;  Service: Gastroenterology   • ENDOSCOPY N/A 8/2/2017    Procedure: ESOPHAGOGASTRODUODENOSCOPY WITH COLD BIOPSIES;  Surgeon: Valentino PEÑA MD;  Location: [facility] ENDOSCOPY;  Service:    • HERNIA REPAIR  2010   • ROTATOR CUFF REPAIR Left 2003   • TOTAL HIP ARTHROPLASTY Right 2011     Family History:   Family History   Problem Relation Age of Onset   • Breast cancer Mother 70   • Dementia Mother    • Malig Hyperthermia Neg Hx      Social History:   Social History     Socioeconomic History   • Marital status:      Spouse name: Kassidy   • Number of children: Not on file   • Years of education: High School   • Highest education level: Not on file   Tobacco Use   • Smoking status: Never Smoker   • Smokeless tobacco: Never Used   Vaping Use   • Vaping Use: Never used   Substance and Sexual Activity   • Alcohol use: No      "Comment: caffeine use: 1-2 cups daily: decaf    • Drug use: No   • Sexual activity: Defer       Vital Signs Range for the last 24 hours  Temperature: Temp:  [36.4 °C (97.5 °F)] 36.4 °C (97.5 °F)   Temp Source: Temp src: Infrared   BP: BP: (157)/(72) 157/72   Pulse: Heart Rate:  [61] 61   Respirations: Resp:  [16] 16   SPO2: SpO2:  [94 %] 94 %   O2 Amount (l/min):     O2 Devices Device (Oxygen Therapy): room air   Weight: Weight:  [83.5 kg (184 lb)] 83.5 kg (184 lb)     Flowsheet Rows      First Filed Value   Admission Height  177.8 cm (70\") Documented at 07/01/2021 0948   Admission Weight  83.5 kg (184 lb) Documented at 07/01/2021 0948          --------------------------------------------------------------------------------    Current Outpatient Medications   Medication Sig Dispense Refill   • Acetaminophen (TYLENOL PO) Take 1 tablet by mouth As Needed.     • amLODIPine (NORVASC) 5 MG tablet Take 1 tablet by mouth Daily. 90 tablet 3   • Cholecalciferol (VITAMIN D3 PO) Take 1,000 mg by mouth Daily.     • coenzyme Q10 50 MG capsule capsule Take 50 mg by mouth Daily.     • doxazosin (CARDURA) 4 MG tablet Take 1.5 tablets by mouth Every Night. 135 tablet 3   • esomeprazole (NEXIUM) 40 MG capsule Take 40 mg by mouth Every Morning Before Breakfast.     • fluticasone (FLONASE) 50 MCG/ACT nasal spray 2 sprays into the nostril(s) as directed by provider As Needed.     • Lactobacillus (PROBIOTIC ACIDOPHILUS PO) Take 1 tablet by mouth Daily.     • levothyroxine (SYNTHROID, LEVOTHROID) 100 MCG tablet      • levothyroxine (SYNTHROID, LEVOTHROID) 50 MCG tablet Take 100 mcg by mouth Daily.     • losartan (COZAAR) 50 MG tablet Take 1 tablet by mouth 2 (Two) Times a Day. 180 tablet 3   • Omega-3 Fatty Acids (FISH OIL) 1000 MG capsule capsule Take 1,000 mg by mouth Daily With Breakfast. Omega Q plus       Current Facility-Administered Medications   Medication Dose Route Frequency Provider Last Rate Last Admin   • bupivacaine (PF) " (MARCAINE) 0.25 % injection 10 mL  10 mL Injection Once Render, Irving Warren MD       • fentaNYL citrate (PF) (SUBLIMAZE) injection 50 mcg  50 mcg Intravenous PRN Render, Irving Warren MD       • iopamidol (ISOVUE-M 200) injection 41%  12 mL Epidural Once in imaging Render, Irving Warren MD       • lidocaine (XYLOCAINE) 1 % injection 1 mL  1 mL Intradermal Once PRN Render, Irving Warren MD       • methylPREDNISolone acetate (DEPO-medrol) injection 80 mg  80 mg Intra-articular Once Render, Irving Warren MD       • midazolam (VERSED) injection 1 mg  1 mg Intravenous PRN Render, Irving Warren MD       • sodium chloride 0.9 % flush 1-10 mL  1-10 mL Intravenous PRN Render, Irving Warren MD           --------------------------------------------------------------------------------  Assessment/Plan      Anesthesia Evaluation           Pain impairs ability to perform ADLs: Exercise/Activity and Ambulation       Airway   Mallampati: II  TM distance: >3 FB  Neck ROM: full  Dental - normal exam     Pulmonary - negative pulmonary ROS and normal exam   (-) not a smoker  Cardiovascular     Rhythm: regular    (+) hypertension, hyperlipidemia,     ROS comment: ECG shows atrial fibrillation with a relatively regular rate, he has an old echocardiogram shows tricuspid regurgitation  PE comment: Dorsal pedal pulses are bounding bilaterally, lower extremities are warm without edema    Neuro/Psych  (-) normal sensory deficit  GI/Hepatic/Renal/Endo    (+)  GERD,  renal disease stones,     Musculoskeletal         PE comment: Is tenderness palpation in the vicinity of the right sacroiliac joint.  Right straight leg raise is relatively normal.  I did not do a full range of motion test as he has a hip arthroplasty and did not want to disrupt that.  Abdominal  - normal exam   Substance History      OB/GYN          Other                 Diagnosis and Plan    Treatment Plan  ASA 2      Procedures: Sacroiliac joint injection, With fluoroscopy,       Anesthetic plan  and risks discussed with patient.      Will attempt sacroiliac joint injection on the right today with fluoroscopic guidance.  If he affords no relief from this we may wish to consider epidural steroid injections in the near future.  I did have a long discussion with him regarding this treatment.    Diagnosis     * Lumbago [M54.5]     * Sacroiliitis, not elsewhere classified (CMS/HCC) [M46.1]

## 2021-07-01 NOTE — ANESTHESIA PROCEDURE NOTES
PAIN SI joint injection    Pre-sedation assessment completed: 7/1/2021 10:18 AM    Patient reassessed immediately prior to procedure    Start time: 7/1/2021 10:18 AM  Stop time: 7/1/2021 10:26 AM    Performed by  Anesthesiologist: Irving Quiñonez MD  Preanesthetic Checklist  Completed: patient identified, site marked, risks and benefits discussed, surgical consent, monitors and equipment checked, pre-op evaluation and timeout performed  Prep:  Patient position: prone  Sterile barriers:cap, gloves, mask and sterile barrier  Prep: ChloraPrep  Patient monitoring: blood pressure monitoring, continuous pulse oximetry and EKG  Procedure:  Sedation:no  Guidance:fluoroscopy  Contrast Medium:yes  Location:Right SIJ  Needle Type:Quincke  Needle Gauge:25 G  Aspiration:negative  Medications:  Depomedrol:80 mg  Isovue:1  Comment:Right SI joint was injected under fluoroscopic guidance.  The patient did comment that that was the exact spot that was painful during the procedure.  1 mL of 0.25% bupivacaine and 80 mg of Depo-Medrol were injected in the SI joint and the needle was withdrawn.  He tolerated procedure well.  If he gets no relief from this injection we may wish to consider epidural steroid injections in the future.    Post Assessment  Injection Assessment: negative

## 2021-07-02 VITALS
DIASTOLIC BLOOD PRESSURE: 66 MMHG | WEIGHT: 186.4 LBS | HEART RATE: 72 BPM | HEIGHT: 69 IN | TEMPERATURE: 98.3 F | BODY MASS INDEX: 27.61 KG/M2 | SYSTOLIC BLOOD PRESSURE: 140 MMHG

## 2021-07-02 VITALS
WEIGHT: 188.6 LBS | TEMPERATURE: 97.7 F | SYSTOLIC BLOOD PRESSURE: 164 MMHG | HEIGHT: 69 IN | HEART RATE: 54 BPM | BODY MASS INDEX: 27.93 KG/M2 | DIASTOLIC BLOOD PRESSURE: 67 MMHG

## 2021-07-02 VITALS
BODY MASS INDEX: 28.64 KG/M2 | SYSTOLIC BLOOD PRESSURE: 135 MMHG | DIASTOLIC BLOOD PRESSURE: 49 MMHG | HEIGHT: 69 IN | HEART RATE: 63 BPM | TEMPERATURE: 97.1 F | WEIGHT: 193.4 LBS

## 2021-07-02 VITALS
HEART RATE: 65 BPM | HEIGHT: 69 IN | BODY MASS INDEX: 28.44 KG/M2 | WEIGHT: 192 LBS | DIASTOLIC BLOOD PRESSURE: 54 MMHG | TEMPERATURE: 97.5 F | SYSTOLIC BLOOD PRESSURE: 131 MMHG

## 2021-07-06 ENCOUNTER — LAB (OUTPATIENT)
Dept: LAB | Facility: HOSPITAL | Age: 81
End: 2021-07-06

## 2021-07-06 DIAGNOSIS — D64.9 ANEMIA, UNSPECIFIED TYPE: ICD-10-CM

## 2021-07-06 DIAGNOSIS — I10 HYPERTENSION, ESSENTIAL: ICD-10-CM

## 2021-07-06 LAB
ALBUMIN SERPL-MCNC: 4.2 G/DL (ref 3.5–5.2)
ALBUMIN/GLOB SERPL: 1.5 G/DL
ALP SERPL-CCNC: 67 U/L (ref 39–117)
ALT SERPL W P-5'-P-CCNC: 15 U/L (ref 1–41)
ANION GAP SERPL CALCULATED.3IONS-SCNC: 10.9 MMOL/L (ref 5–15)
AST SERPL-CCNC: 19 U/L (ref 1–40)
BILIRUB SERPL-MCNC: 0.7 MG/DL (ref 0–1.2)
BUN SERPL-MCNC: 17 MG/DL (ref 8–23)
BUN/CREAT SERPL: 17.5 (ref 7–25)
CALCIUM SPEC-SCNC: 9.2 MG/DL (ref 8.6–10.5)
CHLORIDE SERPL-SCNC: 104 MMOL/L (ref 98–107)
CO2 SERPL-SCNC: 24.1 MMOL/L (ref 22–29)
CREAT SERPL-MCNC: 0.97 MG/DL (ref 0.76–1.27)
GFR SERPL CREATININE-BSD FRML MDRD: 74 ML/MIN/1.73
GLOBULIN UR ELPH-MCNC: 2.8 GM/DL
GLUCOSE SERPL-MCNC: 115 MG/DL (ref 65–99)
HOLD SPECIMEN: NORMAL
IRON 24H UR-MRATE: 108 MCG/DL (ref 59–158)
IRON SATN MFR SERPL: 42 % (ref 20–50)
POTASSIUM SERPL-SCNC: 4.3 MMOL/L (ref 3.5–5.2)
PROT SERPL-MCNC: 7 G/DL (ref 6–8.5)
SODIUM SERPL-SCNC: 139 MMOL/L (ref 136–145)
TIBC SERPL-MCNC: 258 MCG/DL (ref 298–536)
TRANSFERRIN SERPL-MCNC: 173 MG/DL (ref 200–360)
TSH SERPL DL<=0.05 MIU/L-ACNC: 1.27 UIU/ML (ref 0.27–4.2)

## 2021-07-06 PROCEDURE — 83540 ASSAY OF IRON: CPT

## 2021-07-06 PROCEDURE — 36415 COLL VENOUS BLD VENIPUNCTURE: CPT

## 2021-07-06 PROCEDURE — 80053 COMPREHEN METABOLIC PANEL: CPT

## 2021-07-06 PROCEDURE — 84466 ASSAY OF TRANSFERRIN: CPT

## 2021-07-06 PROCEDURE — 84443 ASSAY THYROID STIM HORMONE: CPT

## 2021-08-03 ENCOUNTER — APPOINTMENT (OUTPATIENT)
Dept: PAIN MEDICINE | Facility: HOSPITAL | Age: 81
End: 2021-08-03

## 2021-08-06 ENCOUNTER — TELEPHONE (OUTPATIENT)
Dept: CARDIOLOGY | Facility: CLINIC | Age: 81
End: 2021-08-06

## 2021-08-06 NOTE — TELEPHONE ENCOUNTER
"Dr Mon,  Pt is calling in to let you know that the last 2 days[place], he started feeling faint \"started seeing black spots'  He knew his bp must had dropped and he made his way back to the house, and at that time his bp was 105/53 hr 64.  It has now come up to 139/66    bp 7/1-136/87       7/9-128/62    He is not checking his bp regularly.    Cardiac meds reviewed  Amlodipine 5mg daily  Losartan 50mg BID    He is wondering if he needs a med adjustment?  He stated he is trying to drink 6-8 glass of water per day.  Thanks  Zeina Johnson RN  Triage nurse      "

## 2021-08-06 NOTE — TELEPHONE ENCOUNTER
Pt notified of recommendations and verbalized understanding  Thanks  Zeina Johnson RN  Triage nurse

## 2021-08-20 RX ORDER — LEVOTHYROXINE SODIUM 0.1 MG/1
TABLET ORAL
Qty: 90 TABLET | Refills: 0 | Status: SHIPPED | OUTPATIENT
Start: 2021-08-20 | End: 2021-11-01

## 2021-09-13 ENCOUNTER — OFFICE VISIT (OUTPATIENT)
Dept: FAMILY MEDICINE CLINIC | Age: 81
End: 2021-09-13

## 2021-09-13 VITALS
TEMPERATURE: 98 F | HEIGHT: 70 IN | WEIGHT: 189.2 LBS | HEART RATE: 57 BPM | SYSTOLIC BLOOD PRESSURE: 164 MMHG | DIASTOLIC BLOOD PRESSURE: 71 MMHG | BODY MASS INDEX: 27.09 KG/M2

## 2021-09-13 DIAGNOSIS — Z00.00 MEDICARE ANNUAL WELLNESS VISIT, SUBSEQUENT: Primary | ICD-10-CM

## 2021-09-13 DIAGNOSIS — E78.2 MIXED HYPERLIPIDEMIA: ICD-10-CM

## 2021-09-13 DIAGNOSIS — K21.9 GASTROESOPHAGEAL REFLUX DISEASE, UNSPECIFIED WHETHER ESOPHAGITIS PRESENT: ICD-10-CM

## 2021-09-13 DIAGNOSIS — E03.9 ACQUIRED HYPOTHYROIDISM: ICD-10-CM

## 2021-09-13 DIAGNOSIS — I10 HYPERTENSION, ESSENTIAL: ICD-10-CM

## 2021-09-13 DIAGNOSIS — I48.0 PAF (PAROXYSMAL ATRIAL FIBRILLATION) (HCC): ICD-10-CM

## 2021-09-13 DIAGNOSIS — Z98.890 HISTORY OF CARDIAC RADIOFREQUENCY ABLATION: ICD-10-CM

## 2021-09-13 PROBLEM — K22.719 BARRETT'S ESOPHAGUS WITH DYSPLASIA: Status: ACTIVE | Noted: 2021-09-13

## 2021-09-13 PROBLEM — Z85.820 HISTORY OF MELANOMA: Status: ACTIVE | Noted: 2021-09-13

## 2021-09-13 PROBLEM — M17.0 PRIMARY OSTEOARTHRITIS OF BOTH KNEES: Status: ACTIVE | Noted: 2021-09-13

## 2021-09-13 PROBLEM — I48.91 ATRIAL FIBRILLATION WITH RAPID VENTRICULAR RESPONSE: Status: RESOLVED | Noted: 2020-11-29 | Resolved: 2021-09-13

## 2021-09-13 PROBLEM — N28.1 SIMPLE RENAL CYST: Status: ACTIVE | Noted: 2021-09-13

## 2021-09-13 PROBLEM — Z85.46 HISTORY OF PROSTATE CANCER: Status: ACTIVE | Noted: 2021-09-13

## 2021-09-13 PROCEDURE — G0439 PPPS, SUBSEQ VISIT: HCPCS | Performed by: FAMILY MEDICINE

## 2021-09-13 PROCEDURE — 99213 OFFICE O/P EST LOW 20 MIN: CPT | Performed by: FAMILY MEDICINE

## 2021-09-13 NOTE — PROGRESS NOTES
The ABCs of the Annual Wellness Visit  Subsequent Medicare Wellness Visit    Chief Complaint   Patient presents with   • Medicare Wellness-subsequent      Subjective    History of Present Illness:  Quincy Roberts is a 80 y.o. male who presents for a Subsequent Medicare Wellness Visit.    The following portions of the patient's history were reviewed and   updated as appropriate: allergies, current medications, past family history, past medical history, past social history, past surgical history and problem list.    Compared to one year ago, the patient feels his physical   health is better.    Compared to one year ago, the patient feels his mental   health is the same.      Chronic Health Conditions:  His blood pressure is noted to be elevated some today. At home BP running 140.  Did have some orthostasis a while back and cut the amlodipine in half, but now that Bp running higher he is willing to try increasing again.  Tolerates his medication okay, does have a little bit of trace edema at the ankles bilaterally.  However he says this is improved considerably since his atrial fibrillation was corrected by the ablation.  He was taken off of Eliquis in June post ablation.  He is off so no longer taking amiodarone.       Recent Hospitalizations:  This patient has had a [facility] admission record on file within the last 365 days.    Current Medical Providers:  Patient Care Team:  Jose Valle MD as PCP - General  Jose Valle MD as Referring Physician (Family Medicine)  Adolph Martell MD as Consulting Physician (Hematology and Oncology)  Elmira Cabezas MD as Consulting Physician (Cardiology)    Outpatient Medications Prior to Visit   Medication Sig Dispense Refill   • Acetaminophen (TYLENOL PO) Take 1 tablet by mouth As Needed.     • amLODIPine (NORVASC) 5 MG tablet Take 1 tablet by mouth Daily. (Patient taking differently: Take 2.5 mg by mouth Daily.) 90 tablet 3   • Cholecalciferol  (VITAMIN D3 PO) Take 1,000 mg by mouth Daily.     • coenzyme Q10 50 MG capsule capsule Take 50 mg by mouth Daily.     • doxazosin (CARDURA) 4 MG tablet Take 1.5 tablets by mouth Every Night. 135 tablet 3   • esomeprazole (NEXIUM) 40 MG capsule Take 40 mg by mouth Every Morning Before Breakfast.     • fluticasone (FLONASE) 50 MCG/ACT nasal spray 2 sprays into the nostril(s) as directed by provider As Needed.     • Lactobacillus (PROBIOTIC ACIDOPHILUS PO) Take 1 tablet by mouth Daily.     • levothyroxine (SYNTHROID, LEVOTHROID) 100 MCG tablet TAKE 1 TABLET EVERY DAY 90 tablet 0   • losartan (COZAAR) 50 MG tablet Take 1 tablet by mouth 2 (Two) Times a Day. 180 tablet 3   • Omega-3 Fatty Acids (FISH OIL) 1000 MG capsule capsule Take 1,000 mg by mouth Daily With Breakfast. Omega Q plus     • levothyroxine (SYNTHROID, LEVOTHROID) 50 MCG tablet Take 100 mcg by mouth Daily.       No facility-administered medications prior to visit.       No opioid medication identified on active medication list. I have reviewed chart for other potential  high risk medication/s and harmful drug interactions in the elderly.          Aspirin is not on active medication list.  was taken off by cardiology, will inquire again now that he is off blood thinner.    Patient Active Problem List   Diagnosis   • Hypertension, essential   • Hx of adenomatous colonic polyps   • Macrocytic anemia   • HLD (hyperlipidemia)   • Nocturia   • Coronary-myocardial bridge   • Heart murmur   • Sinus bradycardia   • PAF (paroxysmal atrial fibrillation) (CMS/HCC)   • Abnormal EKG   • Primary osteoarthritis of both knees   • History of prostate cancer   • Acquired hypothyroidism   • Simple renal cyst   • History of melanoma   • Rivas's esophagus with dysplasia   • GERD (gastroesophageal reflux disease)   • Medicare annual wellness visit, subsequent   • History of cardiac radiofrequency ablation     Advance Care Planning  Advance Directive is not on file.  ACP  "discussion was held with the patient during this visit. Patient has an advance directive (not in EMR), copy requested.          Objective    Vitals:    09/13/21 0853 09/13/21 0858 09/13/21 0906   BP: 175/67 164/70 164/71   Pulse: 59 59 57   Temp: 98 °F (36.7 °C)     TempSrc: Oral     Weight: 85.8 kg (189 lb 3.2 oz)     Height: 177.8 cm (70\")       BMI Readings from Last 1 Encounters:   09/13/21 27.15 kg/m²   BMI is above normal parameters. Recommendations include: educational material    Does the patient have evidence of cognitive impairment? No    Physical Exam  Constitutional:       Appearance: Normal appearance.   HENT:      Right Ear: Tympanic membrane normal.      Left Ear: Tympanic membrane normal.      Mouth/Throat:      Mouth: Mucous membranes are moist.      Pharynx: No oropharyngeal exudate or posterior oropharyngeal erythema.   Neck:      Vascular: No carotid bruit.   Cardiovascular:      Rate and Rhythm: Normal rate and regular rhythm.      Heart sounds: Murmur heard.   Systolic murmur is present with a grade of 2/6.     Pulmonary:      Effort: No respiratory distress.      Breath sounds: No wheezing or rales.   Musculoskeletal:      Right lower leg: Edema (Trace to 1+) present.      Left lower leg: Edema (Trace to 1+) present.   Lymphadenopathy:      Cervical: No cervical adenopathy.   Neurological:      General: No focal deficit present.      Mental Status: He is alert.   Psychiatric:         Attention and Perception: Attention normal.         Mood and Affect: Mood normal.         Speech: Speech normal.                 HEALTH RISK ASSESSMENT    Smoking Status:  Social History     Tobacco Use   Smoking Status Never Smoker   Smokeless Tobacco Never Used     Alcohol Consumption:  Social History     Substance and Sexual Activity   Alcohol Use No    Comment: caffeine use: 1-2 cups daily: decaf      Fall Risk Screen:    STEADI Fall Risk Assessment was completed, and patient is at LOW risk for " falls.Assessment completed on:9/13/2021    Depression Screening:  PHQ-2/PHQ-9 Depression Screening 9/13/2021   Little interest or pleasure in doing things 0   Feeling down, depressed, or hopeless 0   Total Score 0       Health Habits and Functional and Cognitive Screening:  Functional & Cognitive Status 9/13/2021   Do you have difficulty preparing food and eating? No   Do you have difficulty bathing yourself, getting dressed or grooming yourself? No   Do you have difficulty using the toilet? No   Do you have difficulty moving around from place to place? No   Do you have trouble with steps or getting out of a bed or a chair? No   Current Diet Well Balanced Diet   Dental Exam Up to date   Eye Exam Up to date   Exercise (times per week) 5 times per week   Current Exercises Include Walking;Gardening;Yard Work        Exercise Comment farming   Do you need help using the phone?  No   Are you deaf or do you have serious difficulty hearing?  No   Do you need help with transportation? No   Do you need help shopping? No   Do you need help preparing meals?  No   Do you need help with housework?  No   Do you need help with laundry? No   Do you need help taking your medications? No   Do you need help managing money? No   Do you ever drive or ride in a car without wearing a seat belt? No   Have you felt unusual stress, anger or loneliness in the last month? No   Who do you live with? Spouse   If you need help, do you have trouble finding someone available to you? No   Have you been bothered in the last four weeks by sexual problems? No   Do you have difficulty concentrating, remembering or making decisions? No       Age-appropriate Screening Schedule:  Refer to the list below for future screening recommendations based on patient's age, sex and/or medical conditions. Orders for these recommended tests are listed in the plan section. The patient has been provided with a written plan.    Health Maintenance   Topic Date Due   •  TDAP/TD VACCINES (1 - Tdap) Never done   • ZOSTER VACCINE (1 of 2) Never done   • INFLUENZA VACCINE  10/01/2021   • LIPID PANEL  01/12/2022              Assessment/Plan   CMS Preventative Services Quick Reference  Risk Factors Identified During Encounter  Cardiovascular Disease  The above risks/problems have been discussed with the patient.  Follow up actions/plans if indicated are seen below in the Assessment/Plan Section.  Pertinent information has been shared with the patient in the After Visit Summary.    Diagnoses and all orders for this visit:    1. Medicare annual wellness visit, subsequent (Primary)  Assessment & Plan:  We reviewed the preventive service recommendations and created an individualized handout      2. Hypertension, essential  Assessment & Plan:  Since his blood pressure running a little elevated again we will have him increase back to 5 mg of the amlodipine.  If he starts to have orthostatic type symptoms again he should call back and let me know and we can make further medication adjustments.  Might consider adding back a low-dose of a diuretic      3. Acquired hypothyroidism  Assessment & Plan:  His most recent TSH was within normal limits.  Continue on current regimen.  Repeat labs in 3 months      4. Gastroesophageal reflux disease, unspecified whether esophagitis present  Assessment & Plan:  Symptomatically doing quite well on current regimen.  No medication changes are needed      5. Mixed hyperlipidemia  Assessment & Plan:  Last labs in January look great.  Continue on current regimen      6. PAF (paroxysmal atrial fibrillation) (CMS/[facility])  Assessment & Plan:  No evidence of recent atrial fibrillation.  He is off of amiodarone and anticoagulant at present.  Status post cardiac ablation.  Since he is no longer taking the anticoagulant suggest he discuss with cardiology whether he should get back on aspirin again      7. History of cardiac radiofrequency ablation  Comments:  Continue to  follow with cardiology as recommended.      Follow Up:   No follow-ups on file.     An After Visit Summary and PPPS were made available to the patient.

## 2021-09-13 NOTE — ASSESSMENT & PLAN NOTE
No evidence of recent atrial fibrillation.  He is off of amiodarone and anticoagulant at present.  Status post cardiac ablation.  Since he is no longer taking the anticoagulant suggest he discuss with cardiology whether he should get back on aspirin again

## 2021-09-13 NOTE — ASSESSMENT & PLAN NOTE
Since his blood pressure running a little elevated again we will have him increase back to 5 mg of the amlodipine.  If he starts to have orthostatic type symptoms again he should call back and let me know and we can make further medication adjustments.  Might consider adding back a low-dose of a diuretic

## 2021-09-13 NOTE — ASSESSMENT & PLAN NOTE
His most recent TSH was within normal limits.  Continue on current regimen.  Repeat labs in 3 months

## 2021-09-26 ENCOUNTER — HOSPITAL ENCOUNTER (EMERGENCY)
Facility: HOSPITAL | Age: 81
Discharge: HOME | End: 2021-09-26
Attending: EMERGENCY MEDICINE | Admitting: EMERGENCY MEDICINE

## 2021-09-26 ENCOUNTER — APPOINTMENT (OUTPATIENT)
Dept: GENERAL RADIOLOGY | Facility: HOSPITAL | Age: 81
End: 2021-09-26

## 2021-09-26 ENCOUNTER — APPOINTMENT (OUTPATIENT)
Dept: CARDIOLOGY | Facility: HOSPITAL | Age: 81
End: 2021-09-26

## 2021-09-26 VITALS
SYSTOLIC BLOOD PRESSURE: 146 MMHG | OXYGEN SATURATION: 98 % | DIASTOLIC BLOOD PRESSURE: 85 MMHG | RESPIRATION RATE: 18 BRPM | TEMPERATURE: 97.8 F | BODY MASS INDEX: 26.34 KG/M2 | HEIGHT: 70 IN | WEIGHT: 184 LBS | HEART RATE: 79 BPM

## 2021-09-26 DIAGNOSIS — S93.602A FOOT SPRAIN, LEFT, INITIAL ENCOUNTER: Primary | ICD-10-CM

## 2021-09-26 LAB
BH CV LOWER VASCULAR LEFT COMMON FEMORAL AUGMENT: NORMAL
BH CV LOWER VASCULAR LEFT COMMON FEMORAL COMPETENT: NORMAL
BH CV LOWER VASCULAR LEFT COMMON FEMORAL COMPRESS: NORMAL
BH CV LOWER VASCULAR LEFT COMMON FEMORAL PHASIC: NORMAL
BH CV LOWER VASCULAR LEFT COMMON FEMORAL SPONT: NORMAL
BH CV LOWER VASCULAR LEFT DISTAL FEMORAL COMPRESS: NORMAL
BH CV LOWER VASCULAR LEFT GASTRONEMIUS COMPRESS: NORMAL
BH CV LOWER VASCULAR LEFT GREATER SAPH AK COMPRESS: NORMAL
BH CV LOWER VASCULAR LEFT GREATER SAPH BK COMPRESS: NORMAL
BH CV LOWER VASCULAR LEFT LESSER SAPH COMPRESS: NORMAL
BH CV LOWER VASCULAR LEFT MID FEMORAL AUGMENT: NORMAL
BH CV LOWER VASCULAR LEFT MID FEMORAL COMPETENT: NORMAL
BH CV LOWER VASCULAR LEFT MID FEMORAL COMPRESS: NORMAL
BH CV LOWER VASCULAR LEFT MID FEMORAL PHASIC: NORMAL
BH CV LOWER VASCULAR LEFT MID FEMORAL SPONT: NORMAL
BH CV LOWER VASCULAR LEFT PERONEAL COMPRESS: NORMAL
BH CV LOWER VASCULAR LEFT POPLITEAL AUGMENT: NORMAL
BH CV LOWER VASCULAR LEFT POPLITEAL COMPETENT: NORMAL
BH CV LOWER VASCULAR LEFT POPLITEAL COMPRESS: NORMAL
BH CV LOWER VASCULAR LEFT POPLITEAL PHASIC: NORMAL
BH CV LOWER VASCULAR LEFT POPLITEAL SPONT: NORMAL
BH CV LOWER VASCULAR LEFT POSTERIOR TIBIAL COMPRESS: NORMAL
BH CV LOWER VASCULAR LEFT PROFUNDA FEMORAL COMPRESS: NORMAL
BH CV LOWER VASCULAR LEFT PROXIMAL FEMORAL COMPRESS: NORMAL
BH CV LOWER VASCULAR LEFT SAPHENOFEMORAL JUNCTION COMPRESS: NORMAL
BH CV LOWER VASCULAR RIGHT COMMON FEMORAL AUGMENT: NORMAL
BH CV LOWER VASCULAR RIGHT COMMON FEMORAL COMPETENT: NORMAL
BH CV LOWER VASCULAR RIGHT COMMON FEMORAL COMPRESS: NORMAL
BH CV LOWER VASCULAR RIGHT COMMON FEMORAL PHASIC: NORMAL
BH CV LOWER VASCULAR RIGHT COMMON FEMORAL SPONT: NORMAL
MAXIMAL PREDICTED HEART RATE: 140 BPM
STRESS TARGET HR: 119 BPM

## 2021-09-26 PROCEDURE — 99283 EMERGENCY DEPT VISIT LOW MDM: CPT

## 2021-09-26 PROCEDURE — 73590 X-RAY EXAM OF LOWER LEG: CPT

## 2021-09-26 PROCEDURE — 93971 EXTREMITY STUDY: CPT

## 2021-09-26 PROCEDURE — 73630 X-RAY EXAM OF FOOT: CPT

## 2021-09-26 RX ORDER — ACETAMINOPHEN 500 MG
1000 TABLET ORAL ONCE
Status: COMPLETED | OUTPATIENT
Start: 2021-09-26 | End: 2021-09-26

## 2021-09-26 RX ADMIN — ACETAMINOPHEN 1000 MG: 500 TABLET, FILM COATED ORAL at 12:50

## 2021-09-26 NOTE — ED PROVIDER NOTES
Pt presents to the ED c/o  sudden onset of shooting, sharp left foot pain since last night.  Patient states he was sitting in his chair and got up to walk to bed and he developed sudden pain in his left foot.  He states is nonradiating.  States it feels like lightning shooting in his foot when he lightly presses on spot on the back of his foot.  He denies swelling, bruising or recent injury.  He denies fever, chills, leg pain or chest pain.     On exam,   His heart is regular rate and rhythm without murmurs.  His lungs are clear to auscultation bilaterally.  His abdomen is NABS, soft, nontender nondistended.  His left leg is nontender to palpation.  There is no calf tenderness.  Left ankle is nontender to palpation.  He has point tenderness on his left lateral midfoot.  He does not have pain with passive dorsiflexion.  Sensation is grossly intact.     Plan: I agree with plan of placing patient in a walking boot and have him follow-up with orthopedist.  If his symptoms do not improve and he developed hair loss in the area, I am concerned that he may be developing complex regional pain syndrome.      Patient was placed in face mask in first look. Patient was wearing facemask when I entered the room and throughout our encounter. I wore full protective equipment throughout this patient encounter including a face mask, eye shield and gloves. Hand hygiene was performed before donning protective equipment and after removal when leaving the room.       Attestation:  The PATRICE and I have discussed this patient's history, physical exam, and treatment plan.  I have reviewed the documentation and personally had a face to face interaction with the patient. I affirm the documentation and agree with the treatment and plan.  The attached note describes my personal findings.            Juan Pinon MD  09/26/21 0012

## 2021-09-26 NOTE — ED PROVIDER NOTES
EMERGENCY DEPARTMENT ENCOUNTER    Room Number:  03/03  Date seen:  9/26/2021  Time seen: 09:16 EDT  PCP: Jose Valle MD  Historian: Patient    HPI:  Chief complaint: Foot pain  A complete HPI/ROS/PMH/PSH/SH/FH are unobtainable due to: None  Context:Quincy Roberts is a 80 y.o. male, who presents to the ED with c/o trying to sleep in bed around 10:30 PM last night when he developed sudden left foot pain that radiates up to his left tib-fib.  He says it is constant and is worse when bearing weight and ambulating, improved with rest.  He denies any recent injury to the area.  Associated symptoms include tingling sensation.  Patient denies any chest pain or shortness of breath.  Denies any history of blood clots, denies any prolonged immobilization.    Patient ports that he does take blood thinners for atrial fibrillation but stopped June 2021 (had cardiac ablation for his atrial fibrillation).    Patient was placed in face mask in first look. Patient was wearing facemask when I entered the room and throughout our encounter. I wore full protective equipment throughout this patient encounter including a face mask, goggles, and gloves. Hand hygiene was performed before donning protective equipment and after removal when leaving the room.      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   • Heart murmur 02/12/2020   • Sinus bradycardia 04/23/2020   • PAF (paroxysmal atrial fibrillation) (CMS/HCC) 10/22/2020   • Abnormal EKG 11/26/2020   • Primary osteoarthritis of both knees 09/13/2021   • History of prostate cancer 09/13/2021   •  Acquired hypothyroidism 09/13/2021   • Simple renal cyst 09/13/2021   • History of melanoma 09/13/2021   • Rivas's esophagus with dysplasia 09/13/2021   • GERD (gastroesophageal reflux disease)    • Medicare annual wellness visit, subsequent 09/13/2021   • History of cardiac radiofrequency ablation 09/13/2021     Resolved Ambulatory Problems     Diagnosis Date Noted   • Fever and chills 10/05/2019   • Pyogenic arthritis of left knee joint (CMS/[facility]) 10/05/2019   • Atrial fibrillation with rapid ventricular response (CMS/[facility]) 11/29/2020     Past Medical History:   Diagnosis Date   • A-fib (CMS/[facility])    • Anemia    • Diverticulosis    • Health care maintenance    • History of colon polyps    • History of jaundice    • History of radiation therapy    • Hyperlipidemia    • Hypertension    • Kidney stones    • Low back pain    • Osteoarthritis    • Prostate cancer (CMS/[facility]) 2014       PAST SURGICAL HISTORY  Past Surgical History:   Procedure Laterality Date   • CARDIAC CATHETERIZATION     • CARDIAC ELECTROPHYSIOLOGY PROCEDURE N/A 11/23/2020    Procedure: Ablation atrial fibrillation;  Surgeon: Brandyn Olivier MD;  Location: [facility] CATH INVASIVE LOCATION;  Service: Cardiovascular;  Laterality: N/A;   • COLONOSCOPY N/A 8/10/2018    Procedure: COLONOSCOPY INTO CECUM AND TERMINAL ILEUM;  Surgeon: Valentino Stern MD;  Location: [facility] ENDOSCOPY;  Service: Gastroenterology   • ENDOSCOPY N/A 8/2/2017    Procedure: ESOPHAGOGASTRODUODENOSCOPY WITH COLD BIOPSIES;  Surgeon: Valentino PEÑA MD;  Location: [facility] ENDOSCOPY;  Service:    • HERNIA REPAIR  2010   • ROTATOR CUFF REPAIR Left 2003   • TOTAL HIP ARTHROPLASTY Right 2011       FAMILY HISTORY  Family History   Problem Relation Age of Onset   • Breast cancer Mother 70   • Dementia Mother    • Malig Hyperthermia Neg Hx        SOCIAL HISTORY  Social History     Socioeconomic History   • Marital status:      Spouse name: Kassidy   • Number of children: Not on  file   • Years of education: High School   • Highest education level: Not on file   Tobacco Use   • Smoking status: Never Smoker   • Smokeless tobacco: Never Used   Vaping Use   • Vaping Use: Never used   Substance and Sexual Activity   • Alcohol use: No     Comment: caffeine use: 1-2 cups daily: decaf    • Drug use: No   • Sexual activity: Defer       REVIEW OF SYSTEMS  Review of Systems    All systems reviewed and negative except for those discussed in HPI.     PHYSICAL EXAM    ED Triage Vitals [09/26/21 0911]   Temp Heart Rate Resp BP SpO2   97.8 °F (36.6 °C) 78 -- -- 98 %      Temp src Heart Rate Source Patient Position BP Location FiO2 (%)   Temporal -- -- -- --     Physical Exam    I have reviewed the triage vital signs and nursing notes.      GENERAL: not distressed  HENT: nares patent  EYES: no scleral icterus  NECK: no ROM limitations  CV: regular rhythm, regular rate  RESPIRATORY: normal effort, clear to auscultation bilaterally  ABDOMEN: soft, nontender  : deferred  MUSCULOSKELETAL: no deformity;   Left foot: There is point tenderness on his left lateral midfoot, no pain with passive dorsiflexion, 2+ dorsalis pedal pulses; brisk capillary refill  NEURO: alert, moves all extremities, follows commands  SKIN: warm, dry    LAB RESULTS  Recent Results (from the past 24 hour(s))   Duplex Venous Lower Extremity - Left CAR    Collection Time: 09/26/21 12:06 PM   Result Value Ref Range    Target HR (85%) 119 bpm    Max. Pred. HR (100%) 140 bpm    Right Common Femoral Spont Y     Right Common Femoral Phasic Y     Right Common Femoral Augment Y     Right Common Femoral Competent Y     Right Common Femoral Compress C     Left Common Femoral Spont Y     Left Common Femoral Phasic Y     Left Common Femoral Augment Y     Left Common Femoral Competent Y     Left Common Femoral Compress C     Left Saphenofemoral Junction Compress C     Left Profunda Femoral Compress C     Left Proximal Femoral Compress C     Left Mid  Femoral Spont Y     Left Mid Femoral Phasic Y     Left Mid Femoral Augment Y     Left Mid Femoral Competent Y     Left Mid Femoral Compress C     Left Distal Femoral Compress C     Left Popliteal Spont Y     Left Popliteal Phasic Y     Left Popliteal Augment Y     Left Popliteal Competent Y     Left Popliteal Compress C     Left Posterior Tibial Compress C     Left Peroneal Compress C     Left Gastronemius Compress C     Left Greater Saph AK Compress C     Left Greater Saph BK Compress C     Left Lesser Saph Compress C          RADIOLOGY RESULTS  XR Tibia Fibula 2 View Left   Final Result       As described.       This report was finalized on 9/26/2021 10:30 AM by Dr. Reyes Anglin M.D.          XR Foot 3+ View Left   Final Result       As described.       This report was finalized on 9/26/2021 10:30 AM by Dr. Reyes Anglin M.D.                PROGRESS, DATA ANALYSIS, CONSULTS AND MEDICAL DECISION MAKING  All labs have been independently reviewed by me.  All radiology studies have been reviewed by me and discussed with radiologist dictating the report. Discussion below represents my analysis of pertinent findings related to patient's condition, differential diagnosis, treatment plan and final disposition.     ED Course as of Sep 26 1342   Sun Sep 26, 2021   1320 I rechecked patient informed him of the unremarkable imaging and send results.  We will place him in a walking boot and I also emphasized the importance of calling his orthopedics doctor tomorrow morning for close follow-up regarding his foot pain.  He says that he will call Dr. Barnard tomorrow morning and will use the walking boot.  I educated patient to return to the ER with any concerning or worsening symptoms.    [SS]      ED Course User Index  [SS] Antonia Gallego PA-C       The differential diagnosis include but are not limited to foot sprain, DVT, lumbar radiculopathy.         Reviewed pt's history and workup with Dr. Pinon.  After a  "bedside evaluation, Dr. Pinon agrees with the plan of care.    (FOR DISCHARGE)The patient's history, physical exam, and lab findings were discussed with the physician, who also performed a face to face history and physical exam.  I discussed all results and noted any abnormalities with patient.  Discussed absoute need to recheck abnormalities with their family physician.  I answered any of the patient's questions.  Discussed plan for discharge, as there is no emergent indication for admission.  Pt is agreeable and understands need for follow up and repeat testing.  Pt is aware that discharge does not mean that nothing is wrong but it indicates no emergency is present and they must continue care with their family physician.  Pt is discharged with instructions to follow up with primary care doctor to have their blood pressure rechecked.         Disposition vitals:  /85 (BP Location: Right arm, Patient Position: Sitting)   Pulse 79   Temp 97.8 °F (36.6 °C) (Temporal)   Resp 18   Ht 177.8 cm (70\")   Wt 83.5 kg (184 lb)   SpO2 98%   BMI 26.40 kg/m²       DIAGNOSIS  Final diagnoses:   Foot sprain, left, initial encounter       FOLLOW UP   Marco Barnard MD  [address] [facility] [address]  [facility] [id number] [phone]    Call in 1 day      [facility] Emergency Department  [address] [facility] [address] [phone]    As needed, If symptoms worsen    Jose Valle MD  [address] [facility] [address]  [facility] [address] [phone]    Call   For close follow-up         Antonia Gallego PA-C  09/26/21 1342    "

## 2021-09-26 NOTE — PROGRESS NOTES
Negative for DVT and SVT in the left lower extremity. Notified Antonia Gallego PA-C with the preliminary results.

## 2021-09-26 NOTE — DISCHARGE INSTRUCTIONS
It is very important that you call Dr. Zachary Barnard tomorrow morning for close follow-up regarding your foot pain.  Use the walking boot until you see orthopedics.  Take Tylenol for your discomfort.  Return to the ER if you develop any concerning or worsening symptoms.

## 2021-09-26 NOTE — ED TRIAGE NOTES
Patient to er from home with c/o left foot pain that started last night. Patient reported no injury to this foot. Patient has mask on in triage along with staff.

## 2021-10-14 ENCOUNTER — OFFICE VISIT (OUTPATIENT)
Dept: CARDIOLOGY | Facility: CLINIC | Age: 81
End: 2021-10-14

## 2021-10-14 VITALS
HEART RATE: 56 BPM | BODY MASS INDEX: 26.48 KG/M2 | WEIGHT: 185 LBS | SYSTOLIC BLOOD PRESSURE: 136 MMHG | HEIGHT: 70 IN | DIASTOLIC BLOOD PRESSURE: 74 MMHG

## 2021-10-14 DIAGNOSIS — I48.0 PAF (PAROXYSMAL ATRIAL FIBRILLATION) (HCC): Primary | ICD-10-CM

## 2021-10-14 PROCEDURE — 99213 OFFICE O/P EST LOW 20 MIN: CPT | Performed by: INTERNAL MEDICINE

## 2021-10-14 PROCEDURE — 93000 ELECTROCARDIOGRAM COMPLETE: CPT | Performed by: INTERNAL MEDICINE

## 2021-11-01 RX ORDER — LEVOTHYROXINE SODIUM 0.1 MG/1
TABLET ORAL
Qty: 90 TABLET | Refills: 1 | Status: SHIPPED | OUTPATIENT
Start: 2021-11-01 | End: 2022-04-21

## 2021-11-17 ENCOUNTER — TELEPHONE (OUTPATIENT)
Dept: CARDIOLOGY | Facility: CLINIC | Age: 81
End: 2021-11-17

## 2021-11-17 RX ORDER — AMLODIPINE BESYLATE 5 MG/1
6.5 TABLET ORAL DAILY
Qty: 135 TABLET | Refills: 4 | Status: SHIPPED | OUTPATIENT
Start: 2021-11-17 | End: 2022-02-02 | Stop reason: SDUPTHER

## 2021-11-17 NOTE — TELEPHONE ENCOUNTER
Pt is calling in to let you know that for the last 3 days his bp has been elevated.  He said he eats the same diet all the time and doesn't think he went over his salt intake, even though he reports eating caned soup.    11/14 158/70 60  11/16 188/80 62  Today 173/75 63 now 168/75 59  He reports he is feeling fine. These were resting blood pressure readings.    Cardiac meds reviewed  Amlodipine 5mg daily  Doxazosin 4mg 1.5tabs daily  Losartan 50mg BID    Please advise on how to proceed  Thanks  Zeina Johnson RN  Triage nurse

## 2021-11-17 NOTE — TELEPHONE ENCOUNTER
Called and passed on  recommendations.   I have sent in a new rx to his pharmacy  Thanks  Zeina Johnson RN  Triage nurse

## 2021-11-29 ENCOUNTER — TELEPHONE (OUTPATIENT)
Dept: FAMILY MEDICINE CLINIC | Age: 81
End: 2021-11-29

## 2021-11-29 DIAGNOSIS — I10 HYPERTENSION, ESSENTIAL: ICD-10-CM

## 2021-11-29 DIAGNOSIS — D64.9 ANEMIA, UNSPECIFIED TYPE: ICD-10-CM

## 2021-11-29 DIAGNOSIS — E03.9 ACQUIRED HYPOTHYROIDISM: Primary | ICD-10-CM

## 2021-11-29 NOTE — TELEPHONE ENCOUNTER
Caller: Quincy Roberts    Relationship to patient: Self    Best call back number: [phone]     Patient is needing: PATIENT CALLED IN AND WOULD LIKE TO HAVE BLOOD WORK ORDERS PER DR. MATA. HE WOULD LIKE A CALL BACK FROM [facility] IF POSSIBLE WHEN THOSE ORDERS ARE READY.

## 2021-11-30 ENCOUNTER — LAB (OUTPATIENT)
Dept: LAB | Facility: HOSPITAL | Age: 81
End: 2021-11-30

## 2021-11-30 DIAGNOSIS — D64.9 ANEMIA, UNSPECIFIED TYPE: ICD-10-CM

## 2021-11-30 DIAGNOSIS — I10 HYPERTENSION, ESSENTIAL: ICD-10-CM

## 2021-11-30 DIAGNOSIS — E03.9 ACQUIRED HYPOTHYROIDISM: ICD-10-CM

## 2021-11-30 LAB
BASOPHILS # BLD AUTO: 0.01 10*3/MM3 (ref 0–0.2)
BASOPHILS NFR BLD AUTO: 0.3 % (ref 0–1.5)
DEPRECATED RDW RBC AUTO: 53 FL (ref 37–54)
EOSINOPHIL # BLD AUTO: 0.06 10*3/MM3 (ref 0–0.4)
EOSINOPHIL NFR BLD AUTO: 1.8 % (ref 0.3–6.2)
ERYTHROCYTE [DISTWIDTH] IN BLOOD BY AUTOMATED COUNT: 13.7 % (ref 12.3–15.4)
HCT VFR BLD AUTO: 32.4 % (ref 37.5–51)
HGB BLD-MCNC: 11.1 G/DL (ref 13–17.7)
IMM GRANULOCYTES # BLD AUTO: 0 10*3/MM3 (ref 0–0.05)
IMM GRANULOCYTES NFR BLD AUTO: 0 % (ref 0–0.5)
LYMPHOCYTES # BLD AUTO: 1.25 10*3/MM3 (ref 0.7–3.1)
LYMPHOCYTES NFR BLD AUTO: 37 % (ref 19.6–45.3)
MCH RBC QN AUTO: 35.9 PG (ref 26.6–33)
MCHC RBC AUTO-ENTMCNC: 34.3 G/DL (ref 31.5–35.7)
MCV RBC AUTO: 104.9 FL (ref 79–97)
MONOCYTES # BLD AUTO: 0.42 10*3/MM3 (ref 0.1–0.9)
MONOCYTES NFR BLD AUTO: 12.4 % (ref 5–12)
NEUTROPHILS NFR BLD AUTO: 1.64 10*3/MM3 (ref 1.7–7)
NEUTROPHILS NFR BLD AUTO: 48.5 % (ref 42.7–76)
PLATELET # BLD AUTO: 174 10*3/MM3 (ref 140–450)
PMV BLD AUTO: 9.1 FL (ref 6–12)
RBC # BLD AUTO: 3.09 10*6/MM3 (ref 4.14–5.8)
WBC NRBC COR # BLD: 3.38 10*3/MM3 (ref 3.4–10.8)

## 2021-11-30 PROCEDURE — 36415 COLL VENOUS BLD VENIPUNCTURE: CPT

## 2021-11-30 PROCEDURE — 80053 COMPREHEN METABOLIC PANEL: CPT

## 2021-11-30 PROCEDURE — 85025 COMPLETE CBC W/AUTO DIFF WBC: CPT

## 2021-11-30 PROCEDURE — 84443 ASSAY THYROID STIM HORMONE: CPT

## 2021-12-01 LAB
ALBUMIN SERPL-MCNC: 4.2 G/DL (ref 3.5–5.2)
ALBUMIN/GLOB SERPL: 1.6 G/DL
ALP SERPL-CCNC: 81 U/L (ref 39–117)
ALT SERPL W P-5'-P-CCNC: 17 U/L (ref 1–41)
ANION GAP SERPL CALCULATED.3IONS-SCNC: 8.7 MMOL/L (ref 5–15)
AST SERPL-CCNC: 19 U/L (ref 1–40)
BILIRUB SERPL-MCNC: 0.7 MG/DL (ref 0–1.2)
BUN SERPL-MCNC: 12 MG/DL (ref 8–23)
BUN/CREAT SERPL: 11.4 (ref 7–25)
CALCIUM SPEC-SCNC: 9 MG/DL (ref 8.6–10.5)
CHLORIDE SERPL-SCNC: 103 MMOL/L (ref 98–107)
CO2 SERPL-SCNC: 26.3 MMOL/L (ref 22–29)
CREAT SERPL-MCNC: 1.05 MG/DL (ref 0.76–1.27)
GFR SERPL CREATININE-BSD FRML MDRD: 68 ML/MIN/1.73
GLOBULIN UR ELPH-MCNC: 2.7 GM/DL
GLUCOSE SERPL-MCNC: 100 MG/DL (ref 65–99)
POTASSIUM SERPL-SCNC: 4 MMOL/L (ref 3.5–5.2)
PROT SERPL-MCNC: 6.9 G/DL (ref 6–8.5)
SODIUM SERPL-SCNC: 138 MMOL/L (ref 136–145)
TSH SERPL DL<=0.05 MIU/L-ACNC: 3.45 UIU/ML (ref 0.27–4.2)

## 2021-12-02 DIAGNOSIS — D53.9 MACROCYTIC ANEMIA: ICD-10-CM

## 2021-12-02 DIAGNOSIS — E78.2 MIXED HYPERLIPIDEMIA: ICD-10-CM

## 2021-12-02 DIAGNOSIS — E03.9 ACQUIRED HYPOTHYROIDISM: Primary | ICD-10-CM

## 2021-12-02 DIAGNOSIS — I10 HYPERTENSION, ESSENTIAL: ICD-10-CM

## 2021-12-03 ENCOUNTER — TELEPHONE (OUTPATIENT)
Dept: CARDIOLOGY | Facility: CLINIC | Age: 81
End: 2021-12-03

## 2021-12-03 NOTE — TELEPHONE ENCOUNTER
Pt is calling In to let you know that since we increased his amlodipine his bp is still up.    11//68  11//67  12/2- 183/74 face felt flushed, he rested and checked again about an hour later 175/75 67  This am 163/64 71 2 hours after amlodipine, has not taken losartan yet and I have instructed him to take it now.    Cardiac meds  Amlodipine 7.5mg daily  Losartan 50mg BID    Please advise on how to proceed  Thanks  Zeina Johnson RN  Triage nurse

## 2021-12-28 ENCOUNTER — OFFICE VISIT (OUTPATIENT)
Dept: CARDIOLOGY | Facility: CLINIC | Age: 81
End: 2021-12-28

## 2021-12-28 VITALS
WEIGHT: 188 LBS | HEIGHT: 70 IN | RESPIRATION RATE: 16 BRPM | BODY MASS INDEX: 26.92 KG/M2 | HEART RATE: 57 BPM | OXYGEN SATURATION: 97 % | SYSTOLIC BLOOD PRESSURE: 136 MMHG | DIASTOLIC BLOOD PRESSURE: 78 MMHG

## 2021-12-28 DIAGNOSIS — I10 HYPERTENSION, ESSENTIAL: Primary | ICD-10-CM

## 2021-12-28 DIAGNOSIS — Z98.890 HISTORY OF CARDIAC RADIOFREQUENCY ABLATION: ICD-10-CM

## 2021-12-28 DIAGNOSIS — I48.0 PAF (PAROXYSMAL ATRIAL FIBRILLATION) (HCC): ICD-10-CM

## 2021-12-28 DIAGNOSIS — Q24.5 CORONARY-MYOCARDIAL BRIDGE: ICD-10-CM

## 2021-12-28 PROCEDURE — 99214 OFFICE O/P EST MOD 30 MIN: CPT | Performed by: NURSE PRACTITIONER

## 2021-12-28 PROCEDURE — 93000 ELECTROCARDIOGRAM COMPLETE: CPT | Performed by: NURSE PRACTITIONER

## 2021-12-28 NOTE — PROGRESS NOTES
Date of Office Visit: 2021  Encounter Provider: JANNY Li  Place of Service: [facility]  Patient Name: Quincy Roberts  :1940      Patient ID:  Quincy Roberts is a 81 y.o. male is here for followup for Hypertension.        History of Present Illness    He was first seen in the chest pain unit at [facility].  He presented there with exertional chest pain, short-windedness, and fatigue.  He went to the emergency department and his blood pressure was very high at about 200/120.  He had not had that issue in quite some time.  Because of his chest pain, we did an ischemic workup.  He ruled out for a myocardial infarction and had a stress nuclear perfusion study done on January 10, 2013, which showed no ischemia.  He previously had a cardiac catheterization done in 2002 which showed myocardial bridging but no significant stenosis.  During his hospitalization, he also had a lipid panel done on January 10, 2013, which showed triglycerides of 59, HDL of 31, LDL of 82, and total cholesterol of 125.    He did have some left upper quadrant pain and for that he saw Dr. Stern.  He subsequently had an EGD.  He also had a CT of his abdomen and pelvis.  The EGD looked fine.  The CT of the abdomen and pelvis suggested diverticular disease.  He then had a colonoscopy performed and 7 polyps were removed, and I think they have been treating him for the diverticular disease. His last EGD was 2017 and was normal.       He was diagnosed with prostate cancer in 2015. He sees Dr. Garcia at [facility]. He underwent radiation therapy for that and it has helped, not only the prostatic hypertrophy, but the prostate cancer.      In 2019, he was hospitalized for left knee swelling and found to have a large left knee effusion.  He was treated with IV vancomycin and underwent left knee arthrocentesis with 110 mL bloody synovial fluid that was  removed.     He was unable to tolerate the hydrochlorothiazide due to itching so it was discontinued.       On 12/3/2019, he came to our office for a blood pressure check and manually it was 154/70 and 168/72.  His blood pressure machine ran much higher at 183/73.  Dr. Cabezas recommended that he follow a low-sodium diet and increased his lisinopril to 40 mg daily.  He had a follow-up BMP on 1/3/2020 which showed normal kidney function and potassium.     Echo done 2/27/2020 showed ejection fraction 61% with borderline dilated left atrium, normal valvular structure and function.  Laboratory values in 2/14/2020 show slightly elevated TSH with normal total T4 and normal T uptake.  He had telemedicine visit with Farida Chiu 4/6/2020 and at that time he was complaining of decreased energy on Bystolic.  His heart rate is running 46-42.  He then stopped the Bystolic     He has a history of symptomatic paroxysmal atrial fibrillation with rapid ventricular response but bradycardia as well.     Echocardiogram 11/27/2020 shows ejection fraction 65% with normal saline contrast study and mild tricuspid insufficiency.  Diastolic function is normal.     He continued to have intermittent heart racing as well as slowing.  This was felt to be due to paroxysmal atrial fibrillation with rapid ventricular response. He underwent pulmonary vein isolation and ablation for cavotricuspid isthmus dependent atrial flutter on 11/23/2020.  He then represented to the hospital 11/29/2020 with palpitations.  He was placed on amiodarone for intermittent atrial fibrillation and dismissed to home.  He saw Dr. Olivier in follow-up on 12/23/2020 who recommended continuation of amiodarone and Eliquis.  He plans to see him back in 2 months and stop amiodarone at that time.  If he has recurrent episodes after the discontinuation of amiodarone, he may need repeat ablation per Dr. Olivier.     He saw Dr. Cabezas in January. He was not having any atrial  fibrillation for several weeks and we decreased his him amiodarone to 200 mg once a day. He was having some issues with hypertension so we increased his amlodipine to 10 mg. He he called the office and stated that he started having trouble with swelling in his feet and his blood pressure was still uncontrolled. Dr. Cabezas asked him to increase his losartan to 50 mg a day, decrease his Norvasc to 5 mg a day, and increase his doxazosin to 6 mg a day.      I saw him in February was continuing to have some issues with blood pressure at home consistently running 170s/60s and 70s.  We do not have any room to increase his amlodipine due to lower extremity edema, so we increased his losartan to 50 mg twice a day.  His amiodarone was stopped as he had not had any atrial fibrillation.  He saw Dr. Cabezas in June 2021, his Eliquis was stopped at that time as he had a normal echocardiogram and no further atrial fibrillation.  He called in August reporting low blood pressures and feeling faint.  His amlodipine was decreased to 2.5 g daily.  He saw Dr. Olivier 10/14/2021 and he has significantly improved since his ablation last year, so electrophysiology signed off.    He called 11/17/2021 to report elevated resting blood pressures for the last 3 days.  He had been eating more canned soup, but did not think that he had changed his diet or gone over his salt intake.  His blood pressures were 150s to 180s over 70s and 80s.  He was on amlodipine and losartan as well as doxazosin 4 mg 1-1/2 tablets daily for his prostate.  His amlodipine was increased to 7.5 mg daily.  He then called on 12/3/2021 and reported that his blood pressures were still running 150s to 180s systolic on amlodipine 7.5 mg daily and losartan 50 mg twice a day.  We increase his amlodipine to 10 mg a day and advised him to watch for leg swelling.    He is here today to follow-up on his blood pressures.  Since increasing his amlodipine he has been running in  the 130s over 70s, and has had a couple readings in the 150s over 70s.  He does not check his blood pressure daily.  He has been feeling good.  He has not had any lower extremity edema.  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  He denies any shortness of breath.  He has not had any dizziness, lightheadedness, syncope, or near syncope.  He denies any chest pain or pressure.  His energy levels been pretty good.  He had a foot sprain on 9/26/2021, but this seems to have been resolved.  He does try to watch his sodium, but does eat canned soups and reduced sodium smith.  His wife tries to cook homemade soups as well so they do not have as much sodium.    Past Medical History:   Diagnosis Date   • A-fib (HCC)    • Anemia    • Diverticulosis    • GERD (gastroesophageal reflux disease)    • Health care maintenance    • History of colon polyps    • History of jaundice     Childhood   • History of prostate cancer 2014   • History of radiation therapy    • Hyperlipidemia    • Hypertension    • Kidney stones    • Low back pain    • Osteoarthritis    • Prostate cancer (HCC) 2014   • Pyogenic arthritis of left knee joint (HCC)          Past Surgical History:   Procedure Laterality Date   • CARDIAC CATHETERIZATION     • CARDIAC ELECTROPHYSIOLOGY PROCEDURE N/A 11/23/2020    Procedure: Ablation atrial fibrillation;  Surgeon: Brandyn Olivier MD;  Location: [facility] CATH INVASIVE LOCATION;  Service: Cardiovascular;  Laterality: N/A;   • COLONOSCOPY N/A 8/10/2018    Procedure: COLONOSCOPY INTO CECUM AND TERMINAL ILEUM;  Surgeon: Valentino Stern MD;  Location: [facility] ENDOSCOPY;  Service: Gastroenterology   • ENDOSCOPY N/A 8/2/2017    Procedure: ESOPHAGOGASTRODUODENOSCOPY WITH COLD BIOPSIES;  Surgeon: Valentino PEÑA MD;  Location: [facility] ENDOSCOPY;  Service:    • HERNIA REPAIR  2010   • ROTATOR CUFF REPAIR Left 2003   • TOTAL HIP ARTHROPLASTY  Right 2011       Current Outpatient Medications on File Prior to Visit   Medication Sig Dispense Refill   • Acetaminophen (TYLENOL PO) Take 1 tablet by mouth As Needed.     • amLODIPine (NORVASC) 5 MG tablet Take 1.25 tablets by mouth Daily. 135 tablet 4   • Cholecalciferol (VITAMIN D3 PO) Take 1,000 mg by mouth Daily.     • coenzyme Q10 50 MG capsule capsule Take 50 mg by mouth Daily.     • doxazosin (CARDURA) 4 MG tablet Take 1.5 tablets by mouth Every Night. 135 tablet 3   • esomeprazole (NEXIUM) 40 MG capsule Take 40 mg by mouth Every Morning Before Breakfast.     • fluticasone (FLONASE) 50 MCG/ACT nasal spray 2 sprays into the nostril(s) as directed by provider As Needed.     • Lactobacillus (PROBIOTIC ACIDOPHILUS PO) Take 1 tablet by mouth Daily.     • levothyroxine (SYNTHROID, LEVOTHROID) 100 MCG tablet TAKE 1 TABLET EVERY DAY 90 tablet 1   • losartan (COZAAR) 50 MG tablet Take 1 tablet by mouth 2 (Two) Times a Day. 180 tablet 3   • Omega-3 Fatty Acids (FISH OIL) 1000 MG capsule capsule Take 1,000 mg by mouth Daily With Breakfast. Omega Q plus       No current facility-administered medications on file prior to visit.       Social History     Socioeconomic History   • Marital status:      Spouse name: Kassidy   • Years of education: High School   Tobacco Use   • Smoking status: Never Smoker   • Smokeless tobacco: Never Used   Vaping Use   • Vaping Use: Never used   Substance and Sexual Activity   • Alcohol use: No     Comment: caffeine use: 1-2 cups daily: decaf    • Drug use: No   • Sexual activity: Defer           Review of Systems   Constitutional: Negative for chills, decreased appetite, diaphoresis, fever and malaise/fatigue.   HENT: Negative for nosebleeds.    Eyes: Negative for blurred vision.   Cardiovascular: Negative for chest pain, claudication, cyanosis, dyspnea on exertion, irregular heartbeat, leg swelling, near-syncope, orthopnea, palpitations, paroxysmal nocturnal dyspnea and syncope.    Respiratory: Negative for cough, hemoptysis, shortness of breath, sleep disturbances due to breathing, snoring and wheezing.    Hematologic/Lymphatic: Negative for bleeding problem. Does not bruise/bleed easily.   Musculoskeletal: Negative for falls.   Gastrointestinal: Negative for heartburn.   Neurological: Negative for excessive daytime sleepiness, dizziness, headaches, light-headedness, numbness and weakness.       Procedures    ECG 12 Lead    Date/Time: 12/28/2021 10:43 AM  Performed by: Yi Ware APRN  Authorized by: Yi Ware APRN   Comparison: compared with previous ECG from 10/14/2021  Similar to previous ECG  Rhythm: sinus rhythm  Rate: normal  BPM: 57  QRS axis: normal    Clinical impression: normal ECG                Objective:    There were no vitals filed for this visit.  There is no height or weight on file to calculate BMI.    Constitutional:       Appearance: Normal and healthy appearance. Well-developed.   Eyes:      Conjunctiva/sclera: Conjunctivae normal.   Neck:      Vascular: No carotid bruit.   Pulmonary:      Effort: Pulmonary effort is normal.      Breath sounds: Normal breath sounds.   Cardiovascular:      PMI at left midclavicular line. Normal rate. Regular rhythm.      Murmurs: There is no murmur.   Pulses:     Intact distal pulses.   Edema:     Peripheral edema absent.   Abdominal:      General: There is no abdominal bruit.   Neurological:      Mental Status: Alert.   Psychiatric:         Behavior: Behavior is cooperative.         Lab Review:       Assessment:      Diagnosis Plan   1. Hypertension, essential     2. PAF (paroxysmal atrial fibrillation) (HCC)     3. History of cardiac radiofrequency ablation     4. Coronary-myocardial bridge          1.  Non-cardiac chest pain. Normal stress nuclear perfusion study done January 2013.  2.  Hypertension, controlled. Goal 120/80.   3.  History of renal cyst, stable.  4.  Hyperlipidemia in the form of low HDL.  5.  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   6.  Stage 1 prostate cancer 2/2015, s/p radiation with Dr. Burns.  7.  h/o PAF with RVR.  s/p ablation 11/23/20.  No further atrial fibrillation and is off amiodarone and Eliquis.  8.  Hypothyroidism with elevated TSH, levothyroxine increased in March 2021, due to repeat labs in the next 2 weeks.     Plan:       Encouraged him to continue to monitor his blood pressure.  He has run pretty low in the past, so I hesitate to start another agent.  I have encouraged him to keep a pretty close eye on his sodium intake and encouraged him to keep a food journal and monitor how much he eats in a day.  It may be that his blood pressure readings are higher in the days where he is had more sodium without him realizing it.  I am not going to make any changes today.  He is allergic to spironolactone, and did not tolerate hydrochlorothiazide.  He has been on Lasix in the past and tolerated it well, this may be a good option for him if he starts having issues with lower extremity edema or sustained hypertension. We will have him follow-up with Dr. Cabezas in 6 months, and he knows to call sooner if his blood pressures continue to be elevated.

## 2022-01-05 RX ORDER — DOXAZOSIN MESYLATE 4 MG/1
TABLET ORAL
Qty: 135 TABLET | Refills: 3 | Status: SHIPPED | OUTPATIENT
Start: 2022-01-05 | End: 2022-12-27 | Stop reason: SDUPTHER

## 2022-02-02 RX ORDER — AMLODIPINE BESYLATE 5 MG/1
10 TABLET ORAL DAILY
Qty: 90 TABLET | Refills: 3 | Status: SHIPPED | OUTPATIENT
Start: 2022-02-02 | End: 2022-03-07 | Stop reason: SDUPTHER

## 2022-02-02 NOTE — TELEPHONE ENCOUNTER
Patient called and stated that he needs a refill on Amlodipine.  He stated that he is taking 10 mg and needs it corrected in his chart at the pharmacy.  I have looked and it looks like he was prescribed 7.4 mg but it is noted that he is taking 10 mg.  Please advise.  He uses [facility]      CB: [phone]      Nehemias

## 2022-02-14 RX ORDER — LOSARTAN POTASSIUM 50 MG/1
TABLET ORAL
Qty: 180 TABLET | Refills: 0 | Status: SHIPPED | OUTPATIENT
Start: 2022-02-14 | End: 2022-05-19

## 2022-02-21 ENCOUNTER — LAB (OUTPATIENT)
Dept: LAB | Facility: HOSPITAL | Age: 82
End: 2022-02-21

## 2022-02-21 DIAGNOSIS — E03.9 ACQUIRED HYPOTHYROIDISM: ICD-10-CM

## 2022-02-21 DIAGNOSIS — I10 HYPERTENSION, ESSENTIAL: ICD-10-CM

## 2022-02-21 DIAGNOSIS — E78.2 MIXED HYPERLIPIDEMIA: ICD-10-CM

## 2022-02-21 DIAGNOSIS — R73.09 ABNORMAL GLUCOSE: ICD-10-CM

## 2022-02-21 DIAGNOSIS — D53.9 MACROCYTIC ANEMIA: ICD-10-CM

## 2022-02-21 DIAGNOSIS — D64.9 ANEMIA, UNSPECIFIED TYPE: ICD-10-CM

## 2022-02-21 LAB
ALBUMIN SERPL-MCNC: 3.8 G/DL (ref 3.5–5.2)
ALBUMIN/GLOB SERPL: 1.3 G/DL
ALP SERPL-CCNC: 75 U/L (ref 39–117)
ALT SERPL W P-5'-P-CCNC: 16 U/L (ref 1–41)
ANION GAP SERPL CALCULATED.3IONS-SCNC: 9.9 MMOL/L (ref 5–15)
AST SERPL-CCNC: 16 U/L (ref 1–40)
BASOPHILS # BLD AUTO: 0.03 10*3/MM3 (ref 0–0.2)
BASOPHILS NFR BLD AUTO: 0.9 % (ref 0–1.5)
BILIRUB SERPL-MCNC: 0.6 MG/DL (ref 0–1.2)
BUN SERPL-MCNC: 12 MG/DL (ref 8–23)
BUN/CREAT SERPL: 11 (ref 7–25)
CALCIUM SPEC-SCNC: 9.2 MG/DL (ref 8.6–10.5)
CHLORIDE SERPL-SCNC: 104 MMOL/L (ref 98–107)
CO2 SERPL-SCNC: 24.1 MMOL/L (ref 22–29)
CREAT SERPL-MCNC: 1.09 MG/DL (ref 0.76–1.27)
DEPRECATED RDW RBC AUTO: 53.4 FL (ref 37–54)
EOSINOPHIL # BLD AUTO: 0.09 10*3/MM3 (ref 0–0.4)
EOSINOPHIL NFR BLD AUTO: 2.7 % (ref 0.3–6.2)
ERYTHROCYTE [DISTWIDTH] IN BLOOD BY AUTOMATED COUNT: 13.8 % (ref 12.3–15.4)
GFR SERPL CREATININE-BSD FRML MDRD: 65 ML/MIN/1.73
GLOBULIN UR ELPH-MCNC: 3 GM/DL
GLUCOSE SERPL-MCNC: 129 MG/DL (ref 65–99)
HCT VFR BLD AUTO: 33.3 % (ref 37.5–51)
HGB BLD-MCNC: 11.3 G/DL (ref 13–17.7)
IMM GRANULOCYTES # BLD AUTO: 0 10*3/MM3 (ref 0–0.05)
IMM GRANULOCYTES NFR BLD AUTO: 0 % (ref 0–0.5)
LYMPHOCYTES # BLD AUTO: 1.03 10*3/MM3 (ref 0.7–3.1)
LYMPHOCYTES NFR BLD AUTO: 30.7 % (ref 19.6–45.3)
MCH RBC QN AUTO: 35.9 PG (ref 26.6–33)
MCHC RBC AUTO-ENTMCNC: 33.9 G/DL (ref 31.5–35.7)
MCV RBC AUTO: 105.7 FL (ref 79–97)
MONOCYTES # BLD AUTO: 0.37 10*3/MM3 (ref 0.1–0.9)
MONOCYTES NFR BLD AUTO: 11 % (ref 5–12)
NEUTROPHILS NFR BLD AUTO: 1.84 10*3/MM3 (ref 1.7–7)
NEUTROPHILS NFR BLD AUTO: 54.7 % (ref 42.7–76)
PLATELET # BLD AUTO: 188 10*3/MM3 (ref 140–450)
PMV BLD AUTO: 9.9 FL (ref 6–12)
POTASSIUM SERPL-SCNC: 4 MMOL/L (ref 3.5–5.2)
PROT SERPL-MCNC: 6.8 G/DL (ref 6–8.5)
RBC # BLD AUTO: 3.15 10*6/MM3 (ref 4.14–5.8)
SODIUM SERPL-SCNC: 138 MMOL/L (ref 136–145)
TSH SERPL DL<=0.05 MIU/L-ACNC: 2.5 UIU/ML (ref 0.27–4.2)
WBC NRBC COR # BLD: 3.36 10*3/MM3 (ref 3.4–10.8)

## 2022-02-21 PROCEDURE — 85025 COMPLETE CBC W/AUTO DIFF WBC: CPT

## 2022-02-21 PROCEDURE — 36415 COLL VENOUS BLD VENIPUNCTURE: CPT

## 2022-02-21 PROCEDURE — 83540 ASSAY OF IRON: CPT

## 2022-02-21 PROCEDURE — 83036 HEMOGLOBIN GLYCOSYLATED A1C: CPT

## 2022-02-21 PROCEDURE — 82728 ASSAY OF FERRITIN: CPT

## 2022-02-21 PROCEDURE — 80053 COMPREHEN METABOLIC PANEL: CPT

## 2022-02-21 PROCEDURE — 84443 ASSAY THYROID STIM HORMONE: CPT

## 2022-02-21 PROCEDURE — 85045 AUTOMATED RETICULOCYTE COUNT: CPT

## 2022-02-21 PROCEDURE — 84466 ASSAY OF TRANSFERRIN: CPT

## 2022-02-22 DIAGNOSIS — D64.9 ANEMIA, UNSPECIFIED TYPE: ICD-10-CM

## 2022-02-22 DIAGNOSIS — R73.09 ABNORMAL GLUCOSE: Primary | ICD-10-CM

## 2022-02-22 LAB
FERRITIN SERPL-MCNC: 258 NG/ML (ref 30–400)
HBA1C MFR BLD: 4.9 % (ref 4.8–5.6)
IRON 24H UR-MRATE: 77 MCG/DL (ref 59–158)
IRON SATN MFR SERPL: 34 % (ref 20–50)
RETICS # AUTO: 0.17 10*6/MM3 (ref 0.02–0.13)
RETICS/RBC NFR AUTO: 3.2 % (ref 0.7–1.9)
TIBC SERPL-MCNC: 228 MCG/DL (ref 298–536)
TRANSFERRIN SERPL-MCNC: 153 MG/DL (ref 200–360)

## 2022-03-07 ENCOUNTER — OFFICE VISIT (OUTPATIENT)
Dept: FAMILY MEDICINE CLINIC | Age: 82
End: 2022-03-07

## 2022-03-07 VITALS
DIASTOLIC BLOOD PRESSURE: 54 MMHG | BODY MASS INDEX: 26.97 KG/M2 | SYSTOLIC BLOOD PRESSURE: 130 MMHG | HEIGHT: 70 IN | HEART RATE: 60 BPM | TEMPERATURE: 98.5 F | WEIGHT: 188.4 LBS

## 2022-03-07 DIAGNOSIS — I10 HYPERTENSION, ESSENTIAL: Primary | ICD-10-CM

## 2022-03-07 DIAGNOSIS — M79.672 LEFT FOOT PAIN: ICD-10-CM

## 2022-03-07 DIAGNOSIS — I48.0 PAF (PAROXYSMAL ATRIAL FIBRILLATION): ICD-10-CM

## 2022-03-07 DIAGNOSIS — Z85.46 HISTORY OF PROSTATE CANCER: ICD-10-CM

## 2022-03-07 PROCEDURE — 99213 OFFICE O/P EST LOW 20 MIN: CPT | Performed by: FAMILY MEDICINE

## 2022-03-07 RX ORDER — AMLODIPINE BESYLATE 10 MG/1
10 TABLET ORAL DAILY
Qty: 90 TABLET | Refills: 1 | Status: SHIPPED | OUTPATIENT
Start: 2022-03-07 | End: 2022-12-28

## 2022-03-07 NOTE — PROGRESS NOTES
Chief Complaint  Hypertension and Hyperlipidemia (F/u )    Subjective          Quincy Roberts presents to [facility]  History of Present Illness  Here for general follow-up of his chronic health issues.   As noted following his last visit with me he ended up in the emergency room.  Inquired about that he says he had foot pain on the lateral aspect of his left foot.  We did a big work-up and ended up telling him they thought it might have been a pinched nerve.  He says after 2 to 3 weeks it is kind of resolved.  First his blood pressure brings in a log of some readings and looks quite a bit better than it had been.  At my last visit with him we had titrated his amlodipine to 5 mg.  Since that time cardiology has increased his dose 2 separate occasions and now he is on 10 mg daily.  We reviewed the Cardiology notes.  Still sees Urology every 6 mo history of prostate cancer.  His last PSA was 0.51 he thinks    Current Outpatient Medications   Medication Sig Dispense Refill   • Acetaminophen (TYLENOL PO) Take 1 tablet by mouth As Needed.     • amLODIPine (NORVASC) 10 MG tablet Take 1 tablet by mouth Daily. 90 tablet 1   • Cholecalciferol (VITAMIN D3 PO) Take 1,000 mg by mouth Daily.     • coenzyme Q10 50 MG capsule capsule Take 50 mg by mouth Daily.     • doxazosin (CARDURA) 4 MG tablet TAKE 1 AND 1/2 TABLETS EVERY NIGHT 135 tablet 3   • esomeprazole (nexIUM) 40 MG capsule Take 40 mg by mouth Every Morning Before Breakfast.     • fluticasone (FLONASE) 50 MCG/ACT nasal spray 2 sprays into the nostril(s) as directed by provider As Needed.     • Lactobacillus (PROBIOTIC ACIDOPHILUS PO) Take 1 tablet by mouth Daily.     • levothyroxine (SYNTHROID, LEVOTHROID) 100 MCG tablet TAKE 1 TABLET EVERY DAY 90 tablet 1   • losartan (COZAAR) 50 MG tablet Take 1 tablet by mouth twice daily 180 tablet 0   • Omega-3 Fatty Acids (FISH OIL) 1000 MG capsule capsule Take 1,000 mg by mouth Daily With  "Breakfast. Omega Q plus       No current facility-administered medications for this visit.       Review of Systems   Constitutional: Negative for chills, fatigue and fever.   Respiratory: Negative for chest tightness, shortness of breath and wheezing.    Cardiovascular: Negative for chest pain and palpitations.   Gastrointestinal: Negative for constipation, diarrhea, nausea and vomiting.   Genitourinary: Negative for dysuria, hematuria and urgency.   Neurological: Negative for weakness and headache.   Psychiatric/Behavioral: Negative for hallucinations.            Objective   Vital Signs:   /54 (BP Location: Left arm, Patient Position: Sitting)   Pulse 60   Temp 98.5 °F (36.9 °C)   Ht 177.8 cm (70\")   Wt 85.5 kg (188 lb 6.4 oz)   BMI 27.03 kg/m²     Physical Exam  Constitutional:       Appearance: Normal appearance.   Neck:      Vascular: No carotid bruit.   Cardiovascular:      Rate and Rhythm: Normal rate and regular rhythm.      Heart sounds: Normal heart sounds. No murmur heard.  Pulmonary:      Effort: No respiratory distress.      Breath sounds: No wheezing or rales.   Musculoskeletal:      Right lower leg: No edema.      Left lower leg: No edema.   Lymphadenopathy:      Cervical: No cervical adenopathy.   Neurological:      General: No focal deficit present.      Mental Status: He is alert.   Psychiatric:         Attention and Perception: Attention normal.         Mood and Affect: Mood normal.         Speech: Speech normal.            Result Review :   The following data was reviewed by: Jose Valle MD on 03/07/2022:    Iron Profile (02/21/2022 10:10)  Ferritin (02/21/2022 10:10)  Hemoglobin A1c (02/21/2022 10:10)  Reticulocytes (02/21/2022 10:10)  Comprehensive Metabolic Panel (02/21/2022 10:10)  TSH (02/21/2022 10:10)  CBC & Differential (02/21/2022 10:10)                Assessment and Plan    Diagnoses and all orders for this visit:    1. Hypertension, essential " (Primary)  Comments:  Blood pressure looks like it is doing good.  Continue on his current regimen  Orders:  -     amLODIPine (NORVASC) 10 MG tablet; Take 1 tablet by mouth Daily.  Dispense: 90 tablet; Refill: 1    2. PAF (paroxysmal atrial fibrillation) ([facility])  Comments:  Continue to follow with cardiology  Assessment & Plan:  He has not had any episodes of palpitations status post ablation.  No longer on anticoagulants.  Continues to follow with cardiology on a regular basis      3. Left foot pain  Comments:  Thankfully his pain has resolved    4. History of prostate cancer  Comments:  Continue to follow with urology as recommended      Follow Up   No follow-ups on file.  Patient was given instructions and counseling regarding his condition or for health maintenance advice. Please see specific information pulled into the AVS if appropriate.

## 2022-03-07 NOTE — ASSESSMENT & PLAN NOTE
He has not had any episodes of palpitations status post ablation.  No longer on anticoagulants.  Continues to follow with cardiology on a regular basis

## 2022-04-21 RX ORDER — LEVOTHYROXINE SODIUM 0.1 MG/1
TABLET ORAL
Qty: 90 TABLET | Refills: 1 | Status: SHIPPED | OUTPATIENT
Start: 2022-04-21 | End: 2023-02-14

## 2022-05-19 RX ORDER — LOSARTAN POTASSIUM 50 MG/1
TABLET ORAL
Qty: 180 TABLET | Refills: 0 | Status: SHIPPED | OUTPATIENT
Start: 2022-05-19 | End: 2022-08-15

## 2022-07-05 ENCOUNTER — TELEPHONE (OUTPATIENT)
Dept: FAMILY MEDICINE CLINIC | Age: 82
End: 2022-07-05

## 2022-07-05 NOTE — TELEPHONE ENCOUNTER
Caller: Quincy Roberts    Relationship: Self    Best call back number: [phone]    Who are you requesting to speak with (clinical staff, provider,  specific staff member): CLINICAL    What was the call regarding: PATIENT STATES HE TESTED POSITIVE FOR COVID AT 11 AM ON 7/5/22. PATIENT STATES YESTERDAY HE HAD A RASPY THROAT AND LOW FEVER BUT THE FEVER HAS SUBSIDED. PATIENT STATES HE WOULD LIKE A CALL TO ADVISE HIM ON WHAT PRECAUTIONS HE NEEDS TO TAKE.    Do you require a callback: YES

## 2022-07-06 ENCOUNTER — TELEPHONE (OUTPATIENT)
Dept: FAMILY MEDICINE CLINIC | Age: 82
End: 2022-07-06

## 2022-07-06 DIAGNOSIS — U07.1 COVID-19 VIRUS INFECTION: Primary | ICD-10-CM

## 2022-07-06 NOTE — TELEPHONE ENCOUNTER
"Doing okay  Started feeling congestion (irritation in wind pipe) on Sunday,  developed temp 100.3 yesterday.  No fever today. Coughing up a little clear stuff.  Says he thinks he is \"over the hump\"  Discussed isolation recommendation and especially should stay away from Kassidy.  Wear a mask if in same room.   Kassidy should test on Friday if asymptomatic, or right away if she becomes symptomatic.     mas    "

## 2022-07-19 ENCOUNTER — OFFICE VISIT (OUTPATIENT)
Dept: CARDIOLOGY | Facility: CLINIC | Age: 82
End: 2022-07-19

## 2022-07-19 VITALS
SYSTOLIC BLOOD PRESSURE: 124 MMHG | HEART RATE: 64 BPM | BODY MASS INDEX: 25.83 KG/M2 | WEIGHT: 180 LBS | DIASTOLIC BLOOD PRESSURE: 72 MMHG | OXYGEN SATURATION: 99 %

## 2022-07-19 DIAGNOSIS — K22.719 BARRETT'S ESOPHAGUS WITH DYSPLASIA: ICD-10-CM

## 2022-07-19 DIAGNOSIS — E78.2 MIXED HYPERLIPIDEMIA: ICD-10-CM

## 2022-07-19 DIAGNOSIS — E03.9 ACQUIRED HYPOTHYROIDISM: ICD-10-CM

## 2022-07-19 DIAGNOSIS — I48.0 PAF (PAROXYSMAL ATRIAL FIBRILLATION): Primary | ICD-10-CM

## 2022-07-19 DIAGNOSIS — Q24.5 CORONARY-MYOCARDIAL BRIDGE: ICD-10-CM

## 2022-07-19 DIAGNOSIS — I10 HYPERTENSION, ESSENTIAL: ICD-10-CM

## 2022-07-19 DIAGNOSIS — Z85.46 HISTORY OF PROSTATE CANCER: ICD-10-CM

## 2022-07-19 PROCEDURE — 99214 OFFICE O/P EST MOD 30 MIN: CPT | Performed by: NURSE PRACTITIONER

## 2022-07-19 PROCEDURE — 93000 ELECTROCARDIOGRAM COMPLETE: CPT | Performed by: NURSE PRACTITIONER

## 2022-07-19 NOTE — PROGRESS NOTES
[facility] CARDIOLOGY  [address]  [facility] [address]  [facility] [address]  Phone: [phone]      Patient Name: Quincy Roberts  :1940  Age: 81 y.o.  Primary Cardiologist: Elmira Cabezas MD  Encounter Provider:  JANNY Cadet      Chief Complaint     Chief Complaint: Follow-up  Feels good    SUBJECTIVE     History of Present Illness:  Quincy Roberts is a 81 y.o.  male whose medical history includes hypertension, hyperlipidemia, osteoarthritis, diverticular disease, and prostate cancer status post radiation.  He is followed in our office by Dr. Cabezas for atrial fibrillation status post ablation.     22 Follow-up:  He is here for 6-month follow-up and I am seeing him for the first time today.  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  Blood pressure at home is 110s-120s/50-60.  He has had 1 episode of lightheadedness when bending over when outside but this is the exception.  He denies chest pain, dyspnea with exertion, orthopnea, leg swelling, palpitations, or syncope.  He has had no further episodes of atrial fibrillation since the immediate postprocedure period of his ablation.    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  • Unable to tolerate hydrochlorothiazide due to itching.  • 2002 cardiac catheterization: Myocardial bridging but no significant stenosis.  • 1/10/2013 ED evaluation for chest pain and hypertension: Stress nuclear perfusion study negative for ischemia.  • 2020 echocardiogram: EF 61%, borderline dilated LA, and normal valvular structure and function.  • 2020 echocardiogram: EF 65%, normal saline bubble study, mild tricuspid insufficiency.  Normal diastolic function.  • 2020 pulmonary vein isolation and ablation for cavotricuspid isthmus dependent atrial flutter Dr. Brandyn Olivier.    Past Medical History:   Diagnosis Date   • A-fib (HCC)    •  Anemia    • Diverticulosis    • GERD (gastroesophageal reflux disease)    • Health care maintenance    • History of colon polyps    • History of jaundice    • History of prostate cancer 2014   • History of radiation therapy    • Hyperlipidemia    • Hypertension    • Kidney stones    • Low back pain    • Osteoarthritis    • Prostate cancer (HCC) 2014   • Pyogenic arthritis of left knee joint (HCC)        Past Surgical History:  · Atrial fibrillation ablation November 2020  · Hernia repair 2010  · left rotator cuff repair 2003  · right total hip arthroplasty 2011    Social History     Socioeconomic History   • Marital status:      Spouse name: Kassidy   • Years of education: High School   Tobacco Use   • Smoking status: Never Smoker   • Smokeless tobacco: Never Used   Vaping Use   • Vaping Use: Never used   Substance and Sexual Activity   • Alcohol use: No     Comment: caffeine use: 1-2 cups daily: decaf    • Drug use: No   • Sexual activity: Defer         Review of Systems     Review of Systems   Cardiovascular: Negative.    Neurological: Positive for light-headedness.       Medications     Allergies as of 07/19/2022 - Reviewed 07/19/2022   Allergen Reaction Noted   • Metoclopramide Anaphylaxis 11/07/2016   • Carvedilol Unknown - Low Severity 01/09/2020   • Hctz [hydrochlorothiazide] Itching 12/03/2019   • Bystolic [nebivolol hcl] Other (See Comments) 04/23/2020   • Spironolactone Rash 08/10/2020       Acetaminophen, Cholecalciferol, Lactobacillus, amLODIPine, coenzyme Q10, doxazosin, esomeprazole, fish oil, fluticasone, levothyroxine, and losartan       OBJECTIVE     Vital Signs:   /72   Pulse 64   Wt 81.6 kg (180 lb)   SpO2 99%   BMI 25.83 kg/m²       Weight:  Wt Readings from Last 3 Encounters:   07/19/22 81.6 kg (180 lb)   03/07/22 85.5 kg (188 lb 6.4 oz)   12/28/21 85.3 kg (188 lb)     Body mass index is 25.83 kg/m².      Physical Exam     Physical Exam  Constitutional:       General: He is not in  acute distress.  HENT:      Head: Normocephalic and atraumatic.      Mouth/Throat:      Mouth: Mucous membranes are moist.   Eyes:      General: No scleral icterus.     Extraocular Movements: Extraocular movements intact.      Conjunctiva/sclera: Conjunctivae normal.      Pupils: Pupils are equal, round, and reactive to light.   Cardiovascular:      Rate and Rhythm: Normal rate and regular rhythm.      Pulses: Normal pulses.      Heart sounds: S1 normal and S2 normal.   Pulmonary:      Effort: No respiratory distress.      Breath sounds: Normal breath sounds. No wheezing, rhonchi or rales.   Abdominal:      General: Bowel sounds are normal. There is no distension.      Palpations: Abdomen is soft.      Tenderness: There is no abdominal tenderness.   Musculoskeletal:         General: Normal range of motion.      Cervical back: Normal range of motion and neck supple.      Right lower leg: No edema.      Left lower leg: No edema.   Skin:     General: Skin is warm and dry.      Coloration: Skin is not jaundiced.   Neurological:      Mental Status: He is alert and oriented to person, place, and time.   Psychiatric:         Mood and Affect: Mood normal.         Reviewed Data     Result Review :  The following data was reviewed by JANNY Cadet on 07/19/22:  • Labs 02.21.2020:  cr 1.1, K 4.0, otherwise unremarkable CMP, Hgb 11.3, Plt 188, hemoglobin A1c 4.9%, TSH 2.500  • Labs 01 12 July 2021: Chol 114, HDL 47 LDL 53, trig 72      ECG 12 Lead    Date/Time: 7/19/2022 9:57 AM  Performed by: Aziza Ayoub APRN  Authorized by: Aziza Ayoub APRN   Comparison: compared with previous ECG from 12/28/2021  Similar to previous ECG  Rhythm: sinus rhythm  Rate: normal  BPM: 59  Conduction: conduction normal  ST Elevation: II and III  ST Depression: V1, V4, V3, V5 and V6    Clinical impression: abnormal EKG            Assessment and Plan        Assessment and Plan     Assessment:  1. PAF  (paroxysmal atrial fibrillation) (HCC)    2. Coronary-myocardial bridge    3. Hypertension, essential    4. Mixed hyperlipidemia    5. Acquired hypothyroidism    6. Rivas's esophagus with dysplasia    7. History of prostate cancer         1. Paroxysmal atrial fibrillation: History of PVI ablation in November 2020 by Dr. Olivier.  He has had no further episodes of atrial fibrillation.  ECG today shows normal sinus rhythm.  He is not on anticoagulation  2. Coronary myocardial bridging: Noted on cardiac catheterization in August 2002; no stenosis.  3. Hypertension: Initially presented with poorly controlled BP but now well controlled on 10 mg Norvasc daily, 4 mg Cardura nightly, and 50 mg losartan daily.  4. Hyperlipidemia: This is followed by his PCP and treated with fish oil.  5. Hypothyroidism: Treated with levothyroxine.  TSH in March 2022 was controlled.  6. Rivas's esophagus: Treated with Nexium.    7. History of prostate cancer: Treated with radiation therapy in 2015.    Plan:  1. I made no medication changes today.  2. He will follow-up with Dr. Cabezas in 6 months.  3. He will discuss the need for vascular screening with his PCP.      Follow Up:  Return in about 6 months (around 1/19/2023) for Follow-up with JANNY Cheng.  Orders Placed This Encounter   Procedures   • ECG 12 Lead      No orders of the defined types were placed in this encounter.        Thank you the opportunity to participate in this patient's care.    JANNY Hayes    This office note has been dictated.

## 2022-08-15 RX ORDER — LOSARTAN POTASSIUM 50 MG/1
TABLET ORAL
Qty: 180 TABLET | Refills: 1 | Status: SHIPPED | OUTPATIENT
Start: 2022-08-15 | End: 2023-01-30 | Stop reason: SDUPTHER

## 2022-09-14 ENCOUNTER — OFFICE VISIT (OUTPATIENT)
Dept: FAMILY MEDICINE CLINIC | Age: 82
End: 2022-09-14

## 2022-09-14 ENCOUNTER — LAB (OUTPATIENT)
Dept: LAB | Facility: HOSPITAL | Age: 82
End: 2022-09-14

## 2022-09-14 VITALS
TEMPERATURE: 98.2 F | SYSTOLIC BLOOD PRESSURE: 137 MMHG | WEIGHT: 183.4 LBS | HEIGHT: 70 IN | BODY MASS INDEX: 26.26 KG/M2 | HEART RATE: 58 BPM | DIASTOLIC BLOOD PRESSURE: 50 MMHG | OXYGEN SATURATION: 98 %

## 2022-09-14 DIAGNOSIS — I10 HYPERTENSION, ESSENTIAL: ICD-10-CM

## 2022-09-14 DIAGNOSIS — E03.9 ACQUIRED HYPOTHYROIDISM: ICD-10-CM

## 2022-09-14 DIAGNOSIS — Z00.00 MEDICARE ANNUAL WELLNESS VISIT, SUBSEQUENT: Primary | ICD-10-CM

## 2022-09-14 DIAGNOSIS — I48.0 PAF (PAROXYSMAL ATRIAL FIBRILLATION): ICD-10-CM

## 2022-09-14 DIAGNOSIS — R73.09 ABNORMAL GLUCOSE: ICD-10-CM

## 2022-09-14 DIAGNOSIS — Z86.16 HISTORY OF COVID-19: ICD-10-CM

## 2022-09-14 DIAGNOSIS — Z85.46 HISTORY OF PROSTATE CANCER: ICD-10-CM

## 2022-09-14 DIAGNOSIS — Z85.820 HISTORY OF MELANOMA: ICD-10-CM

## 2022-09-14 DIAGNOSIS — D64.9 ANEMIA, UNSPECIFIED TYPE: ICD-10-CM

## 2022-09-14 DIAGNOSIS — E78.2 MIXED HYPERLIPIDEMIA: ICD-10-CM

## 2022-09-14 LAB
ALBUMIN SERPL-MCNC: 4.2 G/DL (ref 3.5–5.2)
ALBUMIN/GLOB SERPL: 1.6 G/DL
ALP SERPL-CCNC: 67 U/L (ref 39–117)
ALT SERPL W P-5'-P-CCNC: 13 U/L (ref 1–41)
ANION GAP SERPL CALCULATED.3IONS-SCNC: 8.4 MMOL/L (ref 5–15)
AST SERPL-CCNC: 12 U/L (ref 1–40)
BASOPHILS # BLD AUTO: 0.03 10*3/MM3 (ref 0–0.2)
BASOPHILS NFR BLD AUTO: 0.9 % (ref 0–1.5)
BILIRUB SERPL-MCNC: 0.9 MG/DL (ref 0–1.2)
BUN SERPL-MCNC: 17 MG/DL (ref 8–23)
BUN/CREAT SERPL: 15.6 (ref 7–25)
CALCIUM SPEC-SCNC: 9.5 MG/DL (ref 8.6–10.5)
CHLORIDE SERPL-SCNC: 104 MMOL/L (ref 98–107)
CHOLEST SERPL-MCNC: 111 MG/DL (ref 0–200)
CO2 SERPL-SCNC: 25.6 MMOL/L (ref 22–29)
CREAT SERPL-MCNC: 1.09 MG/DL (ref 0.76–1.27)
DEPRECATED RDW RBC AUTO: 55.9 FL (ref 37–54)
EGFRCR SERPLBLD CKD-EPI 2021: 68.2 ML/MIN/1.73
EOSINOPHIL # BLD AUTO: 0.04 10*3/MM3 (ref 0–0.4)
EOSINOPHIL NFR BLD AUTO: 1.2 % (ref 0.3–6.2)
ERYTHROCYTE [DISTWIDTH] IN BLOOD BY AUTOMATED COUNT: 14.3 % (ref 12.3–15.4)
FOLATE SERPL-MCNC: >20 NG/ML (ref 4.78–24.2)
GLOBULIN UR ELPH-MCNC: 2.6 GM/DL
GLUCOSE SERPL-MCNC: 119 MG/DL (ref 65–99)
HBA1C MFR BLD: 5.2 % (ref 4.8–5.6)
HCT VFR BLD AUTO: 33.7 % (ref 37.5–51)
HDLC SERPL-MCNC: 43 MG/DL (ref 40–60)
HGB BLD-MCNC: 11.2 G/DL (ref 13–17.7)
IMM GRANULOCYTES # BLD AUTO: 0 10*3/MM3 (ref 0–0.05)
IMM GRANULOCYTES NFR BLD AUTO: 0 % (ref 0–0.5)
LDLC SERPL CALC-MCNC: 57 MG/DL (ref 0–100)
LDLC/HDLC SERPL: 1.38 {RATIO}
LYMPHOCYTES # BLD AUTO: 1.09 10*3/MM3 (ref 0.7–3.1)
LYMPHOCYTES NFR BLD AUTO: 32 % (ref 19.6–45.3)
MCH RBC QN AUTO: 35.4 PG (ref 26.6–33)
MCHC RBC AUTO-ENTMCNC: 33.2 G/DL (ref 31.5–35.7)
MCV RBC AUTO: 106.6 FL (ref 79–97)
MONOCYTES # BLD AUTO: 0.37 10*3/MM3 (ref 0.1–0.9)
MONOCYTES NFR BLD AUTO: 10.9 % (ref 5–12)
NEUTROPHILS NFR BLD AUTO: 1.88 10*3/MM3 (ref 1.7–7)
NEUTROPHILS NFR BLD AUTO: 55 % (ref 42.7–76)
PLATELET # BLD AUTO: 180 10*3/MM3 (ref 140–450)
PMV BLD AUTO: 9.2 FL (ref 6–12)
POTASSIUM SERPL-SCNC: 4.5 MMOL/L (ref 3.5–5.2)
PROT SERPL-MCNC: 6.8 G/DL (ref 6–8.5)
PSA SERPL-MCNC: 0.55 NG/ML (ref 0–4)
RBC # BLD AUTO: 3.16 10*6/MM3 (ref 4.14–5.8)
SODIUM SERPL-SCNC: 138 MMOL/L (ref 136–145)
TRIGL SERPL-MCNC: 44 MG/DL (ref 0–150)
TSH SERPL DL<=0.05 MIU/L-ACNC: 2.31 UIU/ML (ref 0.27–4.2)
VIT B12 BLD-MCNC: 1215 PG/ML (ref 211–946)
VLDLC SERPL-MCNC: 11 MG/DL (ref 5–40)
WBC NRBC COR # BLD: 3.41 10*3/MM3 (ref 3.4–10.8)

## 2022-09-14 PROCEDURE — G0439 PPPS, SUBSEQ VISIT: HCPCS | Performed by: FAMILY MEDICINE

## 2022-09-14 PROCEDURE — 84443 ASSAY THYROID STIM HORMONE: CPT | Performed by: FAMILY MEDICINE

## 2022-09-14 PROCEDURE — 84153 ASSAY OF PSA TOTAL: CPT | Performed by: FAMILY MEDICINE

## 2022-09-14 PROCEDURE — 82746 ASSAY OF FOLIC ACID SERUM: CPT | Performed by: FAMILY MEDICINE

## 2022-09-14 PROCEDURE — 82607 VITAMIN B-12: CPT | Performed by: FAMILY MEDICINE

## 2022-09-14 PROCEDURE — 36415 COLL VENOUS BLD VENIPUNCTURE: CPT | Performed by: FAMILY MEDICINE

## 2022-09-14 PROCEDURE — 1170F FXNL STATUS ASSESSED: CPT | Performed by: FAMILY MEDICINE

## 2022-09-14 PROCEDURE — 85025 COMPLETE CBC W/AUTO DIFF WBC: CPT | Performed by: FAMILY MEDICINE

## 2022-09-14 PROCEDURE — 1159F MED LIST DOCD IN RCRD: CPT | Performed by: FAMILY MEDICINE

## 2022-09-14 PROCEDURE — 99213 OFFICE O/P EST LOW 20 MIN: CPT | Performed by: FAMILY MEDICINE

## 2022-09-14 PROCEDURE — 83036 HEMOGLOBIN GLYCOSYLATED A1C: CPT | Performed by: FAMILY MEDICINE

## 2022-09-14 PROCEDURE — 80053 COMPREHEN METABOLIC PANEL: CPT | Performed by: FAMILY MEDICINE

## 2022-09-14 PROCEDURE — 80061 LIPID PANEL: CPT | Performed by: FAMILY MEDICINE

## 2022-09-14 NOTE — PROGRESS NOTES
The ABCs of the Annual Wellness Visit  Subsequent Medicare Wellness Visit    Chief Complaint   Patient presents with   • Medicare Wellness-subsequent      Subjective    History of Present Illness:  Quincy Roberts is a 81 y.o. male who presents for a Subsequent Medicare Wellness Visit.    The following portions of the patient's history were reviewed and   updated as appropriate: allergies, current medications, past family history, past medical history, past social history, past surgical history and problem list.    Compared to one year ago, the patient feels his physical   health is the same.    Compared to one year ago, the patient feels his mental   health is the same.    Recent Hospitalizations:  He was not admitted to the hospital during the last year.   Chronic/Acute Health Issues:    Russ was treated with Paxlovid for COVID infection early July.  He says was pretty mild case.  He feels like he is made a total recovery.  He anticipates they will get the new variant vaccine in the near future.  He wants to hold off on the flu vaccine until later in October.    Had a brief period of atrial fibrillation following his ablation in 2020.  He was treated for a period of time with amiodarone and Eliquis.  His amiodarone was discontinued in 2021.    Has hypertension which has required significant medication alterations over time.  Pressures have been doing fine he brings a list of readings in for him they look quite good.    Has hypothyroidism.  Is due for follow-up labs.    Has a history of prostate cancer and not having any issues..  Also history of melanoma follows up with dermatology on a regular basis.    Has diagnosis of Rivas's esophagus with GERD.  It appears his last EGD was August 2017. He continues on Nexium.  Says Dr Stern told him the Rivas's was maybe a little bit of an overdiagnosis after the last scope.  He says that he will get EGD at his next colonoscopy, which he thinks is due next year. His  last colonoscopy by Dr. Stern was 2018    Has chronic anemia, but not been iron deficient.  In fact his indices show an increase in MCV.    Current Medical Providers:  Patient Care Team:  Jose Valle MD as PCP - General  Jose Valle MD as Referring Physician (Family Medicine)  Adolph Martell MD as Consulting Physician (Hematology and Oncology)  Elmira Cabezas MD as Consulting Physician (Cardiology)    Outpatient Medications Prior to Visit   Medication Sig Dispense Refill   • Acetaminophen (TYLENOL PO) Take 1 tablet by mouth As Needed.     • amLODIPine (NORVASC) 10 MG tablet Take 1 tablet by mouth Daily. 90 tablet 1   • Cholecalciferol (VITAMIN D3 PO) Take 1,000 mg by mouth Daily.     • coenzyme Q10 50 MG capsule capsule Take 50 mg by mouth Daily.     • doxazosin (CARDURA) 4 MG tablet TAKE 1 AND 1/2 TABLETS EVERY NIGHT 135 tablet 3   • esomeprazole (nexIUM) 40 MG capsule Take 40 mg by mouth Every Morning Before Breakfast.     • fluticasone (FLONASE) 50 MCG/ACT nasal spray 2 sprays into the nostril(s) as directed by provider As Needed.     • Lactobacillus (PROBIOTIC ACIDOPHILUS PO) Take 1 tablet by mouth Daily.     • levothyroxine (SYNTHROID, LEVOTHROID) 100 MCG tablet TAKE 1 TABLET EVERY DAY 90 tablet 1   • losartan (COZAAR) 50 MG tablet Take 1 tablet by mouth twice daily 180 tablet 1   • Omega-3 Fatty Acids (FISH OIL) 1000 MG capsule capsule Take 1,000 mg by mouth Daily With Breakfast. Omega Q plus       No facility-administered medications prior to visit.       No opioid medication identified on active medication list. I have reviewed chart for other potential  high risk medication/s and harmful drug interactions in the elderly.          Aspirin is not on active medication list.  Aspirin use is not indicated based on review of current medical condition/s. Risk of harm outweighs potential benefits.  .    Patient Active Problem List   Diagnosis   • Hypertension, essential   • Hx of  "adenomatous colonic polyps   • Macrocytic anemia   • HLD (hyperlipidemia)   • Nocturia   • Coronary-myocardial bridge   • Heart murmur   • Sinus bradycardia   • PAF (paroxysmal atrial fibrillation) ([facility])   • Abnormal EKG   • Primary osteoarthritis of both knees   • History of prostate cancer   • Acquired hypothyroidism   • Simple renal cyst   • History of melanoma   • Rivas's esophagus with dysplasia   • GERD (gastroesophageal reflux disease)   • Medicare annual wellness visit, subsequent   • History of cardiac radiofrequency ablation   • Left foot pain   • History of COVID-19     Advance Care Planning  Advance Directive is not on file.  ACP discussion was held with the patient during this visit. Patient has an advance directive (not in EMR), copy requested.          Objective    Vitals:    09/14/22 0834   BP: 137/50   BP Location: Left arm   Patient Position: Sitting   Cuff Size: Adult   Pulse: 58   Temp: 98.2 °F (36.8 °C)   TempSrc: Oral   SpO2: 98%   Weight: 83.2 kg (183 lb 6.4 oz)   Height: 177.8 cm (70\")     Estimated body mass index is 26.32 kg/m² as calculated from the following:    Height as of this encounter: 177.8 cm (70\").    Weight as of this encounter: 83.2 kg (183 lb 6.4 oz).    BMI is >= 25 and <30. (Overweight) The following options were offered after discussion;: none (medical contraindication)      Does the patient have evidence of cognitive impairment? No    Physical Exam  Vitals and nursing note reviewed.   Constitutional:       General: He is not in acute distress.     Appearance: Normal appearance. He is obese.   HENT:      Right Ear: Tympanic membrane and ear canal normal.      Left Ear: Tympanic membrane and ear canal normal.      Mouth/Throat:      Mouth: Mucous membranes are moist.      Pharynx: Oropharynx is clear. No oropharyngeal exudate.   Eyes:      General: No scleral icterus.     Conjunctiva/sclera: Conjunctivae normal.   Neck:      Vascular: No carotid bruit.   Cardiovascular:     "  Rate and Rhythm: Normal rate and regular rhythm.      Heart sounds: No murmur heard.    No friction rub. No gallop.   Pulmonary:      Effort: No respiratory distress.      Breath sounds: No wheezing or rales.   Abdominal:      General: Bowel sounds are normal.      Palpations: Abdomen is soft. There is no mass.      Tenderness: There is no abdominal tenderness. There is no guarding or rebound.   Musculoskeletal:         General: No swelling.   Lymphadenopathy:      Cervical: No cervical adenopathy.   Skin:     Coloration: Skin is not jaundiced.      Findings: No lesion.      Comments: He does have fairly extensive seborrheic keratoses on his back   Neurological:      General: No focal deficit present.      Mental Status: He is alert and oriented to person, place, and time.   Psychiatric:         Mood and Affect: Mood normal.         Behavior: Behavior normal.         Thought Content: Thought content normal.         Judgment: Judgment normal.                 HEALTH RISK ASSESSMENT    Smoking Status:  Social History     Tobacco Use   Smoking Status Never Smoker   Smokeless Tobacco Never Used     Alcohol Consumption:  Social History     Substance and Sexual Activity   Alcohol Use No    Comment: caffeine use: 1-2 cups daily: decAllofMe      Fall Risk Screen:    [facility] Fall Risk Assessment was completed, and patient is at LOW risk for falls.Assessment completed on:9/14/2022    Depression Screening:  PHQ-2/PHQ-9 Depression Screening 9/14/2022   Retired PHQ-9 Total Score -   Retired Total Score -   Little Interest or Pleasure in Doing Things 0-->not at all   Feeling Down, Depressed or Hopeless 0-->not at all   PHQ-9: Brief Depression Severity Measure Score 0       Health Habits and Functional and Cognitive Screening:  Functional & Cognitive Status 9/14/2022   Do you have difficulty preparing food and eating? No   Do you have difficulty bathing yourself, getting dressed or grooming yourself? No   Do you have difficulty using  the toilet? No   Do you have difficulty moving around from place to place? No   Do you have trouble with steps or getting out of a bed or a chair? No   Current Diet Well Balanced Diet   Dental Exam Up to date   Eye Exam Up to date   Exercise (times per week) 7 times per week   Current Exercises Include Yard Work;House Cleaning;Gardening;Other        Exercise Comment -   Do you need help using the phone?  No   Are you deaf or do you have serious difficulty hearing?  No   Do you need help with transportation? No   Do you need help shopping? No   Do you need help preparing meals?  No   Do you need help with housework?  No   Do you need help with laundry? No   Do you need help taking your medications? No   Do you need help managing money? No   Do you ever drive or ride in a car without wearing a seat belt? No   Have you felt unusual stress, anger or loneliness in the last month? No   Who do you live with? Spouse   If you need help, do you have trouble finding someone available to you? No   Have you been bothered in the last four weeks by sexual problems? No   Do you have difficulty concentrating, remembering or making decisions? No       Age-appropriate Screening Schedule:  Refer to the list below for future screening recommendations based on patient's age, sex and/or medical conditions. Orders for these recommended tests are listed in the plan section. The patient has been provided with a written plan.    Health Maintenance   Topic Date Due   • ZOSTER VACCINE (2 of 3) 03/15/2017   • LIPID PANEL  01/12/2022   • INFLUENZA VACCINE  10/01/2022   • TDAP/TD VACCINES (3 - Tdap) 09/11/2030              Assessment & Plan   CMS Preventative Services Quick Reference  Risk Factors Identified During Encounter  None Identified  The above risks/problems have been discussed with the patient.  Follow up actions/plans if indicated are seen below in the Assessment/Plan Section.  Pertinent information has been shared with the patient in  the After Visit Summary.    Diagnoses and all orders for this visit:    1. Medicare annual wellness visit, subsequent (Primary)  Comments:  We reviewed the preventive service recommendations and created an individualized handout    2. Hypertension, essential  Comments:  Blood pressure is elevated little bit but for him its doing quite well I am satisfied with the current results.  We will continue his current regimen.  Orders:  -     Comprehensive Metabolic Panel    3. Mixed hyperlipidemia  Comments:  We will check labs and predicate medication changes based on results  Orders:  -     Lipid Panel  -     Comprehensive Metabolic Panel    4. PAF (paroxysmal atrial fibrillation) (HCC)  Comments:  He is in sinus rhythm today.  Status post ablation.  No longer taking amiodarone or Eliquis.  Orders:  -     TSH    5. History of melanoma  Comments:  Continue to follow with dermatology  Assessment & Plan:  Continues to follow with Dr. Carlton and sees her again in about one week.      6. History of prostate cancer  Comments:  We will check PSA for follow-up  Orders:  -     PSA DIAGNOSTIC    7. Acquired hypothyroidism    8. Abnormal glucose  Comments:  His previous labs showed elevated glucose.  I will add hemoglobin A1c to investigate further  Orders:  -     Hemoglobin A1c    9. Anemia, unspecified type  -     Vitamin B12  -     Folate  -     CBC & Differential    10. History of COVID-19  Comments:  Seems to have made a total recovery      Follow Up:   No follow-ups on file.     An After Visit Summary and PPPS were made available to the patient.

## 2022-10-18 ENCOUNTER — CLINICAL SUPPORT (OUTPATIENT)
Dept: FAMILY MEDICINE CLINIC | Age: 82
End: 2022-10-18

## 2022-10-18 DIAGNOSIS — Z23 NEED FOR INFLUENZA VACCINATION: Primary | ICD-10-CM

## 2022-10-18 PROCEDURE — 90662 IIV NO PRSV INCREASED AG IM: CPT | Performed by: FAMILY MEDICINE

## 2022-10-18 PROCEDURE — G0008 ADMIN INFLUENZA VIRUS VAC: HCPCS | Performed by: FAMILY MEDICINE

## 2022-11-03 ENCOUNTER — DOCUMENTATION (OUTPATIENT)
Dept: GASTROENTEROLOGY | Facility: CLINIC | Age: 82
End: 2022-11-03

## 2022-12-27 ENCOUNTER — TELEPHONE (OUTPATIENT)
Dept: FAMILY MEDICINE CLINIC | Age: 82
End: 2022-12-27

## 2022-12-27 DIAGNOSIS — I10 HYPERTENSION, ESSENTIAL: ICD-10-CM

## 2022-12-27 RX ORDER — DOXAZOSIN MESYLATE 4 MG/1
TABLET ORAL
Qty: 135 TABLET | Refills: 1 | Status: SHIPPED | OUTPATIENT
Start: 2022-12-27

## 2022-12-27 NOTE — TELEPHONE ENCOUNTER
Caller: Quincy Roberts    Relationship: Self    Best call back number: [phone]    Requested Prescriptions:   Requested Prescriptions     Pending Prescriptions Disp Refills   • doxazosin (CARDURA) 4 MG tablet 135 tablet 3        Pharmacy where request should be sent: [facility] - [facility] [address] - [phone]  - [phone] FX     Additional details provided by patient: PATIENT NEEDS A NEW PRESCRIPTION FOR THIS MEDICATION SENT TO THE PHARMACY ASAP.    Does the patient have less than a 3 day supply:  [] Yes  [x] No    Would you like a call back once the refill request has been completed: [] Yes [] No    If the office needs to give you a call back, can they leave a voicemail: [] Yes [] No    Bright Valdez Rep   12/27/22 13:28 EST

## 2022-12-28 RX ORDER — AMLODIPINE BESYLATE 10 MG/1
TABLET ORAL
Qty: 90 TABLET | Refills: 0 | Status: SHIPPED | OUTPATIENT
Start: 2022-12-28

## 2023-01-03 ENCOUNTER — PRE-PROCEDURE SCREENING (OUTPATIENT)
Dept: GASTROENTEROLOGY | Facility: CLINIC | Age: 83
End: 2023-01-03
Payer: MEDICARE

## 2023-01-03 DIAGNOSIS — D12.6 ADENOMATOUS POLYP OF COLON, UNSPECIFIED PART OF COLON: Primary | ICD-10-CM

## 2023-01-03 NOTE — TELEPHONE ENCOUNTER
LAST SCOPE 8/18 IN Epic --Personal history of polyps--No family history of polyps or colon cancer--No blood thinners--Medications:              Acetaminophen (TYLENOL PO)  amLODIPine (NORVASC) 10 MG tablet  Cholecalciferol (VITAMIN D3 PO)  coenzyme Q10 50 MG capsule capsule    doxazosin (CARDURA) 4 MG tablet  esomeprazole (nexIUM) 40 MG capsule  fluticasone (FLONASE) 50 MCG/ACT nasal spray  Lactobacillus (PROBIOTIC ACIDOPHILUS PO)    levothyroxine (SYNTHROID, LEVOTHROID) 100 MCG tablet    losartan (COZAAR) 50 MG tablet  Omega-3 Fatty Acids (FISH OIL) 1000 MG capsule capsule         QUESTIONNAIRE SCEEENING  HAS BEEN SENT TO DOCTOR FOR REVIEW

## 2023-01-19 ENCOUNTER — OFFICE VISIT (OUTPATIENT)
Dept: CARDIOLOGY | Facility: CLINIC | Age: 83
End: 2023-01-19
Payer: MEDICARE

## 2023-01-19 VITALS
HEIGHT: 70 IN | DIASTOLIC BLOOD PRESSURE: 76 MMHG | SYSTOLIC BLOOD PRESSURE: 136 MMHG | WEIGHT: 188 LBS | HEART RATE: 55 BPM | BODY MASS INDEX: 26.92 KG/M2

## 2023-01-19 DIAGNOSIS — I48.0 PAF (PAROXYSMAL ATRIAL FIBRILLATION): Primary | ICD-10-CM

## 2023-01-19 DIAGNOSIS — I10 HYPERTENSION, ESSENTIAL: ICD-10-CM

## 2023-01-19 DIAGNOSIS — E78.2 MIXED HYPERLIPIDEMIA: ICD-10-CM

## 2023-01-19 PROCEDURE — 99214 OFFICE O/P EST MOD 30 MIN: CPT | Performed by: INTERNAL MEDICINE

## 2023-01-19 PROCEDURE — 93000 ELECTROCARDIOGRAM COMPLETE: CPT | Performed by: INTERNAL MEDICINE

## 2023-01-19 NOTE — PROGRESS NOTES
Date of Office Visit: 2023  Encounter Provider: Elmira Cabezas MD  Place of Service: [facility]  Patient Name: Quincy Roberts  :1940      Patient ID:  Quincy Roberts is a 82 y.o. male is here for  followup for atrial fibrillation.        History of Present Illness    He presented  with exertional chest pain, short-windedness,  and fatigue.  He had a stress  nuclear perfusion study done on January 10, 2013, which showed no ischemia.  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      He did have some left upper quadrant pain and for  that he saw Dr. Stern.  He subsequently had an EGD.  He also had a CT of his abdomen  and pelvis.  The EGD looked fine.  The CT of the abdomen and pelvis suggested diverticular  disease.  He then had a colonoscopy performed and 7 polyps were removed, and I think they  have been treating him for the diverticular disease.      He was diagnosed with prostate cancer in 2015. He sees Dr. Garcia  at [facility]. He underwent radiation therapy for that and it has helped, not only the  prostatic hypertrophy, but the prostate cancer.      He was unable to tolerate the hydrochlorothiazide due to itching so it was discontinued.  He had bradycardia with Bystolic.     Echocardiogram 2020 shows ejection fraction 65% with normal saline contrast study and mild tricuspid insufficiency.  Diastolic function normal.     He continued to have paroxysmal atrial fibrillation with rapid ventricular response. He underwent pulmonary vein isolation and ablation for cavotricuspid isthmus dependent atrial flutter on 2020.  He then represented to the hospital 2020 with palpitations.  He was placed on amiodarone for intermittent atrial fibrillation and dismissed to home.  He saw Dr. Olivier in follow-up on 2020 who recommended continuation of  amiodarone and Eliquis.      His amiodarone and Eliquis were then weaned off.    He has no dizziness, syncope, chest pain, difficulty breathing, heart racing or skipping.  He has some mild edema in his legs at night when he comes in.  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  He stays very active during the day.  He takes his amlodipine in the morning, doxazosin at night and losartan twice daily.  He has had no difficulty with his medications.  He tries to keep his weight at about 180.  He has no orthopnea or PND and has a good energy level.  He says his appetite is good.  Checks his blood pressure runs 120-130 over 60s.    Past Medical History:   Diagnosis Date   • A-fib (HCC)    • Anemia    • Diverticulosis    • GERD (gastroesophageal reflux disease)    • Health care maintenance    • History of colon polyps    • History of jaundice     Childhood   • History of prostate cancer 2014   • History of radiation therapy    • Hyperlipidemia    • Hypertension    • Kidney stones    • Low back pain    • Osteoarthritis    • Prostate cancer (HCC) 2014   • Pyogenic arthritis of left knee joint (HCC)          Past Surgical History:   Procedure Laterality Date   • CARDIAC CATHETERIZATION     • CARDIAC ELECTROPHYSIOLOGY PROCEDURE N/A 11/23/2020    Procedure: Ablation atrial fibrillation;  Surgeon: Brandyn Olivier MD;  Location: [facility] CATH INVASIVE LOCATION;  Service: Cardiovascular;  Laterality: N/A;   • COLONOSCOPY N/A 8/10/2018    Procedure: COLONOSCOPY INTO CECUM AND TERMINAL ILEUM;  Surgeon: Valentino Stern MD;  Location: [facility] ENDOSCOPY;  Service: Gastroenterology   • ENDOSCOPY N/A 8/2/2017    Procedure: ESOPHAGOGASTRODUODENOSCOPY WITH COLD BIOPSIES;  Surgeon: Valentino PEÑA MD;  Location: [facility] ENDOSCOPY;  Service:    • HERNIA REPAIR  2010   • ROTATOR CUFF REPAIR Left 2003   • TOTAL HIP ARTHROPLASTY Right 2011       Current Outpatient Medications on File Prior to Visit   Medication  "Sig Dispense Refill   • Acetaminophen (TYLENOL PO) Take 1 tablet by mouth As Needed.     • amLODIPine (NORVASC) 10 MG tablet TAKE 1 TABLET EVERY DAY 90 tablet 0   • Cholecalciferol (VITAMIN D3 PO) Take 1,000 mg by mouth Daily.     • coenzyme Q10 50 MG capsule capsule Take 50 mg by mouth Daily.     • doxazosin (CARDURA) 4 MG tablet Take 1 1/2 daily 135 tablet 1   • esomeprazole (nexIUM) 40 MG capsule Take 40 mg by mouth Every Morning Before Breakfast.     • fluticasone (FLONASE) 50 MCG/ACT nasal spray 2 sprays into the nostril(s) as directed by provider As Needed.     • Lactobacillus (PROBIOTIC ACIDOPHILUS PO) Take 1 tablet by mouth Daily.     • levothyroxine (SYNTHROID, LEVOTHROID) 100 MCG tablet TAKE 1 TABLET EVERY DAY 90 tablet 1   • losartan (COZAAR) 50 MG tablet Take 1 tablet by mouth twice daily 180 tablet 1   • Omega-3 Fatty Acids (FISH OIL) 1000 MG capsule capsule Take 1,000 mg by mouth Daily With Breakfast. Omega Q plus       No current facility-administered medications on file prior to visit.       Social History     Socioeconomic History   • Marital status:      Spouse name: Kassidy   • Years of education: High School   Tobacco Use   • Smoking status: Never   • Smokeless tobacco: Never   Vaping Use   • Vaping Use: Never used   Substance and Sexual Activity   • Alcohol use: No     Comment: caffeine use: 1-2 cups daily: decaf    • Drug use: No   • Sexual activity: Defer           ROS    Procedures    ECG 12 Lead    Date/Time: 1/19/2023 10:43 AM  Performed by: Elmira Cabezas MD  Authorized by: Elmira Cabezas MD   Comparison: compared with previous ECG   Similar to previous ECG  Rhythm: sinus rhythm    Clinical impression: normal ECG                Objective:      Vitals:    01/19/23 1025   BP: 136/76   Pulse: 55   Weight: 85.3 kg (188 lb)   Height: 177.8 cm (70\")     Body mass index is 26.98 kg/m².    Vitals reviewed.   Constitutional:       General: Not in acute distress.     Appearance: " Well-developed. Not diaphoretic.   Eyes:      General: No scleral icterus.     Conjunctiva/sclera: Conjunctivae normal.   HENT:      Head: Normocephalic and atraumatic.   Neck:      Thyroid: No thyromegaly.      Vascular: No carotid bruit or JVD.      Lymphadenopathy: No cervical adenopathy.   Pulmonary:      Effort: Pulmonary effort is normal. No respiratory distress.      Breath sounds: Normal breath sounds. No wheezing. No rhonchi. No rales.   Chest:      Chest wall: Not tender to palpatation.   Cardiovascular:      Normal rate. Regular rhythm.      Murmurs: There is no murmur.      No gallop.   Pulses:     Intact distal pulses.   Edema:     Peripheral edema absent.   Abdominal:      General: Bowel sounds are normal. There is no distension or abdominal bruit.      Palpations: Abdomen is soft. There is no abdominal mass.      Tenderness: There is no abdominal tenderness.   Musculoskeletal:         General: No deformity.      Extremities: No clubbing present.     Cervical back: Neck supple. Skin:     General: Skin is warm and dry. There is no cyanosis.      Coloration: Skin is not pale.      Findings: No rash.   Neurological:      Mental Status: Alert and oriented to person, place, and time.      Cranial Nerves: No cranial nerve deficit.   Psychiatric:         Judgment: Judgment normal.         Lab Review:       Assessment:      Diagnosis Plan   1. PAF (paroxysmal atrial fibrillation) (HCC)        2. Hypertension, essential        3. Mixed hyperlipidemia          1. Non-cardiac chest pain. Normal stress nuclear perfusion study done January 2013.  2. Hypertension, controlled. Goal 120/80.   3. History of renal cyst, stable.  4. Hyperlipidemia in the form of low HDL.   5. 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   6. Stage 1 prostate cancer 2/2015, s/p radiation with Dr. Burns.  7. h/o PAF with RVR.  s/p ablation 11/23/20.  No further atrial fibrillation and is off amiodarone and  apixaban.  8.  Hypothyroidism, on levothyroxine.      Plan:       See randy in 1 year. No changes and no testing, doing well.

## 2023-01-30 ENCOUNTER — TELEPHONE (OUTPATIENT)
Dept: FAMILY MEDICINE CLINIC | Age: 83
End: 2023-01-30
Payer: MEDICARE

## 2023-01-30 RX ORDER — LOSARTAN POTASSIUM 50 MG/1
50 TABLET ORAL 2 TIMES DAILY
Qty: 180 TABLET | Refills: 1 | Status: SHIPPED | OUTPATIENT
Start: 2023-01-30

## 2023-01-30 NOTE — TELEPHONE ENCOUNTER
Caller: Quincy Roberts    Relationship: Self    Best call back number: [phone]    Requested Prescriptions:   Requested Prescriptions     Pending Prescriptions Disp Refills   • losartan (COZAAR) 50 MG tablet 180 tablet 1     Sig: Take 1 tablet by mouth 2 (Two) Times a Day.        Pharmacy where request should be sent: [facility] [address] [place] [address] YAZMIN BROOKS [facility] [phone] [facility] [phone] [facility]     Additional details provided by patient: PATIENT WOULD LIKE A 90 DAY SUPPLY.     Does the patient have less than a 3 day supply:  [] Yes  [x] No    Would you like a call back once the refill request has been completed: [] Yes [x] No    If the office needs to give you a call back, can they leave a voicemail: [] Yes [x] No    Jo Ann Cooper, PCT   01/30/23 09:44 EST

## 2023-02-14 RX ORDER — LEVOTHYROXINE SODIUM 0.1 MG/1
TABLET ORAL
Qty: 90 TABLET | Refills: 1 | Status: SHIPPED | OUTPATIENT
Start: 2023-02-14

## 2023-02-15 ENCOUNTER — TELEPHONE (OUTPATIENT)
Dept: GASTROENTEROLOGY | Facility: CLINIC | Age: 83
End: 2023-02-15
Payer: MEDICARE

## 2023-02-15 PROBLEM — D12.6 ADENOMATOUS POLYP OF COLON: Status: ACTIVE | Noted: 2023-02-15

## 2023-02-15 NOTE — TELEPHONE ENCOUNTER
OK FOR HUB TO READ  TOMMY patient via telephone for COLONOSCOPY. Scheduled 5/26/23 with arrival time of 1100AM. Prep paperwork mailed to verified address on file. Patient advised arrival time may change based on [facility] guidelines. TOMMY MONROE

## 2023-02-22 ENCOUNTER — TELEPHONE (OUTPATIENT)
Dept: FAMILY MEDICINE CLINIC | Age: 83
End: 2023-02-22
Payer: MEDICARE

## 2023-02-23 DIAGNOSIS — E78.2 MIXED HYPERLIPIDEMIA: ICD-10-CM

## 2023-02-23 DIAGNOSIS — I10 HYPERTENSION, ESSENTIAL: ICD-10-CM

## 2023-02-23 DIAGNOSIS — E03.9 ACQUIRED HYPOTHYROIDISM: ICD-10-CM

## 2023-02-23 DIAGNOSIS — D53.9 MACROCYTIC ANEMIA: Primary | ICD-10-CM

## 2023-03-13 ENCOUNTER — TELEPHONE (OUTPATIENT)
Dept: FAMILY MEDICINE CLINIC | Age: 83
End: 2023-03-13
Payer: MEDICARE

## 2023-03-13 NOTE — TELEPHONE ENCOUNTER
1ST attempt- spoke with pt on 3/13/23 regarding pending labs. Pt states he will be in within the next week to complete these./derian

## 2023-03-21 ENCOUNTER — TRANSCRIBE ORDERS (OUTPATIENT)
Dept: FAMILY MEDICINE CLINIC | Age: 83
End: 2023-03-21
Payer: MEDICARE

## 2023-03-21 DIAGNOSIS — M54.50 ACUTE BILATERAL LOW BACK PAIN, UNSPECIFIED WHETHER SCIATICA PRESENT: Primary | ICD-10-CM

## 2023-03-22 ENCOUNTER — LAB (OUTPATIENT)
Dept: LAB | Facility: HOSPITAL | Age: 83
End: 2023-03-22
Payer: MEDICARE

## 2023-03-22 DIAGNOSIS — I10 HYPERTENSION, ESSENTIAL: ICD-10-CM

## 2023-03-22 DIAGNOSIS — D53.9 MACROCYTIC ANEMIA: ICD-10-CM

## 2023-03-22 DIAGNOSIS — E03.9 ACQUIRED HYPOTHYROIDISM: ICD-10-CM

## 2023-03-22 DIAGNOSIS — E78.2 MIXED HYPERLIPIDEMIA: ICD-10-CM

## 2023-03-22 LAB
ALBUMIN SERPL-MCNC: 4 G/DL (ref 3.5–5.2)
ALBUMIN/GLOB SERPL: 1.3 G/DL
ALP SERPL-CCNC: 75 U/L (ref 39–117)
ALT SERPL W P-5'-P-CCNC: 24 U/L (ref 1–41)
ANION GAP SERPL CALCULATED.3IONS-SCNC: 9 MMOL/L (ref 5–15)
AST SERPL-CCNC: 28 U/L (ref 1–40)
BASOPHILS # BLD AUTO: 0.03 10*3/MM3 (ref 0–0.2)
BASOPHILS # BLD AUTO: 0.03 10*3/MM3 (ref 0–0.2)
BASOPHILS NFR BLD AUTO: 0.7 % (ref 0–1.5)
BASOPHILS NFR BLD AUTO: 0.7 % (ref 0–1.5)
BILIRUB SERPL-MCNC: 0.6 MG/DL (ref 0–1.2)
BUN SERPL-MCNC: 14 MG/DL (ref 8–23)
BUN/CREAT SERPL: 13.2 (ref 7–25)
CALCIUM SPEC-SCNC: 9.2 MG/DL (ref 8.6–10.5)
CHLORIDE SERPL-SCNC: 105 MMOL/L (ref 98–107)
CO2 SERPL-SCNC: 26 MMOL/L (ref 22–29)
CREAT SERPL-MCNC: 1.06 MG/DL (ref 0.76–1.27)
DEPRECATED RDW RBC AUTO: 49.5 FL (ref 37–54)
DEPRECATED RDW RBC AUTO: 53.3 FL (ref 37–54)
EGFRCR SERPLBLD CKD-EPI 2021: 70.1 ML/MIN/1.73
EOSINOPHIL # BLD AUTO: 0.11 10*3/MM3 (ref 0–0.4)
EOSINOPHIL # BLD AUTO: 0.11 10*3/MM3 (ref 0–0.4)
EOSINOPHIL NFR BLD AUTO: 2.4 % (ref 0.3–6.2)
EOSINOPHIL NFR BLD AUTO: 2.5 % (ref 0.3–6.2)
ERYTHROCYTE [DISTWIDTH] IN BLOOD BY AUTOMATED COUNT: 13.1 % (ref 12.3–15.4)
ERYTHROCYTE [DISTWIDTH] IN BLOOD BY AUTOMATED COUNT: 14.1 % (ref 12.3–15.4)
GLOBULIN UR ELPH-MCNC: 3.1 GM/DL
GLUCOSE SERPL-MCNC: 122 MG/DL (ref 65–99)
HCT VFR BLD AUTO: 32.8 % (ref 37.5–51)
HCT VFR BLD AUTO: 33.4 % (ref 37.5–51)
HGB BLD-MCNC: 11.5 G/DL (ref 13–17.7)
HGB BLD-MCNC: 11.6 G/DL (ref 13–17.7)
IMM GRANULOCYTES # BLD AUTO: 0 10*3/MM3 (ref 0–0.05)
IMM GRANULOCYTES # BLD AUTO: 0.01 10*3/MM3 (ref 0–0.05)
IMM GRANULOCYTES NFR BLD AUTO: 0 % (ref 0–0.5)
IMM GRANULOCYTES NFR BLD AUTO: 0.2 % (ref 0–0.5)
LYMPHOCYTES # BLD AUTO: 1.22 10*3/MM3 (ref 0.7–3.1)
LYMPHOCYTES # BLD AUTO: 1.28 10*3/MM3 (ref 0.7–3.1)
LYMPHOCYTES NFR BLD AUTO: 27.5 % (ref 19.6–45.3)
LYMPHOCYTES NFR BLD AUTO: 27.8 % (ref 19.6–45.3)
MCH RBC QN AUTO: 36.1 PG (ref 26.6–33)
MCH RBC QN AUTO: 36.4 PG (ref 26.6–33)
MCHC RBC AUTO-ENTMCNC: 34.4 G/DL (ref 31.5–35.7)
MCHC RBC AUTO-ENTMCNC: 35.4 G/DL (ref 31.5–35.7)
MCV RBC AUTO: 102.8 FL (ref 79–97)
MCV RBC AUTO: 104.7 FL (ref 79–97)
MONOCYTES # BLD AUTO: 0.44 10*3/MM3 (ref 0.1–0.9)
MONOCYTES # BLD AUTO: 0.45 10*3/MM3 (ref 0.1–0.9)
MONOCYTES NFR BLD AUTO: 10.1 % (ref 5–12)
MONOCYTES NFR BLD AUTO: 9.6 % (ref 5–12)
NEUTROPHILS NFR BLD AUTO: 2.62 10*3/MM3 (ref 1.7–7)
NEUTROPHILS NFR BLD AUTO: 2.74 10*3/MM3 (ref 1.7–7)
NEUTROPHILS NFR BLD AUTO: 59 % (ref 42.7–76)
NEUTROPHILS NFR BLD AUTO: 59.5 % (ref 42.7–76)
NRBC BLD AUTO-RTO: 0 /100 WBC (ref 0–0.2)
PATHOLOGY REVIEW: YES
PLATELET # BLD AUTO: 188 10*3/MM3 (ref 140–450)
PLATELET # BLD AUTO: 200 10*3/MM3 (ref 140–450)
PMV BLD AUTO: 10.6 FL (ref 6–12)
PMV BLD AUTO: 9.7 FL (ref 6–12)
POTASSIUM SERPL-SCNC: 4.4 MMOL/L (ref 3.5–5.2)
PROT SERPL-MCNC: 7.1 G/DL (ref 6–8.5)
RBC # BLD AUTO: 3.19 10*6/MM3 (ref 4.14–5.8)
RBC # BLD AUTO: 3.19 10*6/MM3 (ref 4.14–5.8)
SODIUM SERPL-SCNC: 140 MMOL/L (ref 136–145)
TSH SERPL DL<=0.05 MIU/L-ACNC: 3.82 UIU/ML (ref 0.27–4.2)
WBC NRBC COR # BLD: 4.44 10*3/MM3 (ref 3.4–10.8)
WBC NRBC COR # BLD: 4.6 10*3/MM3 (ref 3.4–10.8)

## 2023-03-22 PROCEDURE — 36415 COLL VENOUS BLD VENIPUNCTURE: CPT

## 2023-03-22 PROCEDURE — 80053 COMPREHEN METABOLIC PANEL: CPT

## 2023-03-22 PROCEDURE — 85025 COMPLETE CBC W/AUTO DIFF WBC: CPT

## 2023-03-22 PROCEDURE — 84443 ASSAY THYROID STIM HORMONE: CPT

## 2023-03-23 LAB
LAB AP CASE REPORT: NORMAL
PATH REPORT.FINAL DX SPEC: NORMAL

## 2023-03-24 ENCOUNTER — OFFICE VISIT (OUTPATIENT)
Dept: ORTHOPEDIC SURGERY | Facility: CLINIC | Age: 83
End: 2023-03-24
Payer: MEDICARE

## 2023-03-24 VITALS — TEMPERATURE: 96 F | WEIGHT: 189.8 LBS | HEIGHT: 70 IN | BODY MASS INDEX: 27.17 KG/M2

## 2023-03-24 DIAGNOSIS — M25.552 LEFT HIP PAIN: ICD-10-CM

## 2023-03-24 DIAGNOSIS — M46.1 SACROILIITIS: Primary | ICD-10-CM

## 2023-03-24 DIAGNOSIS — M54.16 LUMBAR RADICULOPATHY: ICD-10-CM

## 2023-03-24 DIAGNOSIS — R52 PAIN: ICD-10-CM

## 2023-03-24 PROCEDURE — 99213 OFFICE O/P EST LOW 20 MIN: CPT | Performed by: NURSE PRACTITIONER

## 2023-03-24 PROCEDURE — 1159F MED LIST DOCD IN RCRD: CPT | Performed by: NURSE PRACTITIONER

## 2023-03-24 PROCEDURE — 1160F RVW MEDS BY RX/DR IN RCRD: CPT | Performed by: NURSE PRACTITIONER

## 2023-03-24 PROCEDURE — 72100 X-RAY EXAM L-S SPINE 2/3 VWS: CPT | Performed by: NURSE PRACTITIONER

## 2023-03-24 NOTE — PROGRESS NOTES
Patient Name: Quincy Roberts   YOB: 1940  Referring Primary Care Physician: Jose Valle MD      Chief Complaint:    Chief Complaint   Patient presents with   • Lumbar Spine - Establish Care, Pain        HPI:  Quincy Roberst is a 82 y.o. male who presents to [facility] for evaluation of left low back/buttock pain. This is an established patient of practice who has had right KHALIDA with Dr. Barnard.  Most recently saw JANNY Major on 6/28/2021.  It also appears he was evaluated by Dr. Osorio 3/18/2021, presumably for the current complaints.  He denies any pain radiating down lower extremities.  He has been treated by chiropractor in the past with significant relief.  It appears he also had SI joint injection 2021 and recalls getting relief.  He denies any bowel or bladder dysfunction or saddle anesthesia.  He denies any lower extremity weakness.  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  Prior pertinent records were reviewed.    PFSH:  See attached    ROS: As per HPI, otherwise negative    Objective:    Vitals:    03/24/23 1011   Temp: 96 °F (35.6 °C)     Body mass index is 27.23 kg/m².      Neurologic Exam     Mental Status   Oriented to person, place, and time.   Speech: speech is normal     Motor Exam   Muscle bulk: normal  Overall muscle tone: normal    Strength   Strength 5/5 throughout.     Sensory Exam   Light touch normal.     Gait, Coordination, and Reflexes     Gait  Gait: normal    Reflexes   Right patellar: 0  Left patellar: 0  Right achilles: 0  Left achilles: 0     Right Hip Exam     Tenderness   The patient is experiencing tenderness in the posterior.      Back Exam     Tenderness   The patient is experiencing tenderness in the lumbar.    Tests   Straight leg raise right: negative  Straight leg raise left: negative    Other   Erythema: no back redness          Physical Exam  Musculoskeletal:      Lumbar back: Negative right straight leg  raise test and negative left straight leg raise test.   Neurological:      Mental Status: He is oriented to person, place, and time.      Motor: Motor strength is normal.      Gait: Gait is intact.      Deep Tendon Reflexes:      Reflex Scores:       Patellar reflexes are 0 on the right side and 0 on the left side.       Achilles reflexes are 0 on the right side and 0 on the left side.  Psychiatric:         Speech: Speech normal.     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    Assessment:           Diagnoses and all orders for this visit:    1. Sacroiliitis (HCC) (Primary)  -     Ambulatory Referral to Pain Management  -     Ambulatory Referral to Physical Therapy    2. Lumbar radiculopathy  -     Ambulatory Referral to Pain Management  -     Ambulatory Referral to Physical Therapy    3. Left hip pain  -     Ambulatory Referral to Physical Therapy    4. Pain  -     XR Spine Lumbar AP & Lateral             Plan:    Although he had severe stenosis worse at L4-5 followed by L3-4 in 2017, he does not describe any claudication.  He may have radiculopathy but his left buttock pain seems to be more SI joint related as he has no pain referring down the lower extremity.  We will update lumbar MRI to rule out any worsening central stenosis or foraminal narrowing, however we will also refer him to pain management at [place] which is closer to his home to discuss left SI joint injection versus epidural injection.  We will also refer to  physical therapy to work on stretching and strengthening exercises focusing on SI joint and radiculopathy.  We will call him with the lumbar MRI.    Return if symptoms worsen or fail to improve, call with MRI.

## 2023-03-28 ENCOUNTER — OFFICE VISIT (OUTPATIENT)
Dept: FAMILY MEDICINE CLINIC | Age: 83
End: 2023-03-28
Payer: MEDICARE

## 2023-03-28 VITALS
BODY MASS INDEX: 27.27 KG/M2 | WEIGHT: 190.5 LBS | HEART RATE: 59 BPM | SYSTOLIC BLOOD PRESSURE: 143 MMHG | DIASTOLIC BLOOD PRESSURE: 59 MMHG | HEIGHT: 70 IN | OXYGEN SATURATION: 94 %

## 2023-03-28 DIAGNOSIS — I48.0 PAF (PAROXYSMAL ATRIAL FIBRILLATION): ICD-10-CM

## 2023-03-28 DIAGNOSIS — D53.9 MACROCYTIC ANEMIA: ICD-10-CM

## 2023-03-28 DIAGNOSIS — E03.9 ACQUIRED HYPOTHYROIDISM: ICD-10-CM

## 2023-03-28 DIAGNOSIS — R73.09 ABNORMAL GLUCOSE: ICD-10-CM

## 2023-03-28 DIAGNOSIS — I10 HYPERTENSION, ESSENTIAL: Primary | ICD-10-CM

## 2023-03-28 DIAGNOSIS — M54.16 LUMBAR RADICULOPATHY: ICD-10-CM

## 2023-03-28 PROBLEM — M48.061 SPINAL STENOSIS OF LUMBAR REGION WITHOUT NEUROGENIC CLAUDICATION: Status: ACTIVE | Noted: 2023-03-28

## 2023-03-28 LAB
EXPIRATION DATE: NORMAL
HBA1C MFR BLD: 5.1 %
Lab: NORMAL

## 2023-03-28 RX ORDER — METHYLPREDNISOLONE 4 MG/1
TABLET ORAL
Qty: 1 EACH | Refills: 0 | Status: SHIPPED | OUTPATIENT
Start: 2023-03-28

## 2023-03-28 NOTE — PROGRESS NOTES
Chief Complaint  Hypertension, Hyperlipidemia, PAF (paroxysmal atrial fibrillation) (HCC), and History of melanoma    Subjective          Quincy Roberts presents to [facility]  History of Present Illness        Has hypertension which has required significant medication alterations over time.  Pressures have been doing fine he brings a list of readings in for him they look quite good.  Pressure today however is elevated but he says he is in considerable pain right buttock area.     He reports having left buttock pain pain for past few weeks.  He was thinking he might be left hip problem since he has had previous problems with the right hip.  In fact, he recently saw Anna Valle in the orthopedic office that note is reviewed.  Pain can be 10/10.  Walking with a cane due to the pain.  Problem with bowel or bladder control     Has a history of prostate cancer  which presents another concern given his pain issues and risk of bony mets.  His last PSA September remained rather stable, but was 0.546.         Has hypothyroidism.  Last labs looked okay March 22      Has diagnosis of Rivas's esophagus with GERD.  It appears his last EGD was August 2017.  Says Dr Stern told him the Rivas's was maybe a little bit of an overdiagnosis after the last scope. He continues on Nexium.     His last colonoscopy by Dr. Stern was 2018.  He is scheduled in May for the repeat colonoscopy.      Has chronic anemia, but not been iron deficient.  In fact his indices show an increase in MCV.  Was released from follow-up by hematology.  Hemoglobin March 22 remained stable.    Had a brief period of atrial fibrillation following his ablation in 2020.  He was treated for a period of time with amiodarone and Eliquis.  His amiodarone was discontinued in 2021.  He has subsequently weaned off of Eliquis as well.  Continues to follow with cardiology and I reviewed that note from January 19       Current  "Outpatient Medications   Medication Sig Dispense Refill   • Acetaminophen (TYLENOL PO) Take 1 tablet by mouth As Needed.     • amLODIPine (NORVASC) 10 MG tablet TAKE 1 TABLET EVERY DAY 90 tablet 0   • Cholecalciferol (VITAMIN D3 PO) Take 1,000 mg by mouth Daily.     • coenzyme Q10 50 MG capsule capsule Take 1 capsule by mouth Daily.     • doxazosin (CARDURA) 4 MG tablet Take 1 1/2 daily 135 tablet 1   • esomeprazole (nexIUM) 40 MG capsule Take 1 capsule by mouth Every Morning Before Breakfast.     • fluticasone (FLONASE) 50 MCG/ACT nasal spray 2 sprays into the nostril(s) as directed by provider As Needed.     • Lactobacillus (PROBIOTIC ACIDOPHILUS PO) Take 1 tablet by mouth Daily.     • levothyroxine (SYNTHROID, LEVOTHROID) 100 MCG tablet TAKE 1 TABLET EVERY DAY 90 tablet 1   • losartan (COZAAR) 50 MG tablet Take 1 tablet by mouth 2 (Two) Times a Day. 180 tablet 1   • Omega-3 Fatty Acids (FISH OIL) 1000 MG capsule capsule Take 1 capsule by mouth Daily With Breakfast. Omega Q plus     • methylPREDNISolone (Medrol) 4 MG dose pack Take as directed on package instructions. 1 each 0     No current facility-administered medications for this visit.       Review of Systems         Objective   Vital Signs:   /59 (BP Location: Left arm, Patient Position: Sitting, Cuff Size: Large Adult)   Pulse 59   Ht 177.8 cm (70\")   Wt 86.4 kg (190 lb 8 oz)   SpO2 94%   BMI 27.33 kg/m²     Physical Exam   No acute distress  Color is good  Eyes nonicteric  Tympanic membranes clear  Oropharynx clear  No cervical or supraclavicular adenopathy  Heart is regular rhythm with no murmur  Lungs are bilaterally clear  Does have tenderness in the left SI joint area  No gross lateralizing focal neurologic deficit  Trace pedal edema bilaterally    Result Review :   The following data was reviewed by: Jose Valle MD on 03/28/2023:  Pathology Consultation (03/22/2023 08:53)  CBC Auto Differential (03/22/2023 08:53)  Peripheral Blood " Smear (03/22/2023 08:53)  CBC & Differential (03/22/2023 08:53)  Comprehensive Metabolic Panel (03/22/2023 08:53)  TSH (03/22/2023 08:53)                  Assessment and Plan    Diagnoses and all orders for this visit:    1. Hypertension, essential (Primary)  Assessment & Plan:  Blood pressure looks okay.  Continue current regimen      2. Acquired hypothyroidism  Assessment & Plan:  Reviewed recent labs continue on current regimen      3. PAF (paroxysmal atrial fibrillation) (HCC)  Assessment & Plan:  He is not aware of any issues with palpitations.  Atrial fibrillation has resolved post ablation therapy.  He is now off of amiodarone and anticoagulation      4. Macrocytic anemia  Assessment & Plan:  He has been released from regular follow-up visits by heme-onc.  We do periodically check labs and his recent CBC has remained stable with macrocytic anemia.      5. Abnormal glucose  -     POC Glycosylated Hemoglobin (Hb A1C)    6. Lumbar radiculopathy  Assessment & Plan:  Is pain could be radicular in nature or might be referred simply reflective of sacroiliitis.  We will proceed with ordering the MRI as recommended by orthopedics.  We will get that scheduled here in [place] to save him travel to [place].  Determine the role of possible epidural injectionsafter  review of that result.  We will go ahead and place him on Medrol Dosepak which could help with either of these problems    Orders:  -     MRI Lumbar Spine Without Contrast; Future  -     methylPREDNISolone (Medrol) 4 MG dose pack; Take as directed on package instructions.  Dispense: 1 each; Refill: 0      Follow Up   No follow-ups on file.  Patient was given instructions and counseling regarding his condition or for health maintenance advice. Please see specific information pulled into the AVS if appropriate.

## 2023-03-31 ENCOUNTER — HOSPITAL ENCOUNTER (OUTPATIENT)
Dept: MRI IMAGING | Facility: HOSPITAL | Age: 83
Discharge: HOME | End: 2023-03-31
Admitting: FAMILY MEDICINE
Payer: MEDICARE

## 2023-03-31 DIAGNOSIS — M54.16 LUMBAR RADICULOPATHY: ICD-10-CM

## 2023-03-31 PROCEDURE — 72148 MRI LUMBAR SPINE W/O DYE: CPT

## 2023-03-31 NOTE — ASSESSMENT & PLAN NOTE
He is not aware of any issues with palpitations.  Atrial fibrillation has resolved post ablation therapy.  He is now off of amiodarone and anticoagulation

## 2023-03-31 NOTE — ASSESSMENT & PLAN NOTE
Is pain could be radicular in nature or might be referred simply reflective of sacroiliitis.  We will proceed with ordering the MRI as recommended by orthopedics.  We will get that scheduled here in [place] to save him travel to [place].  Determine the role of possible epidural injectionsafter  review of that result.  We will go ahead and place him on Medrol Dosepak which could help with either of these problems

## 2023-03-31 NOTE — ASSESSMENT & PLAN NOTE
He has been released from regular follow-up visits by heme-onc.  We do periodically check labs and his recent CBC has remained stable with macrocytic anemia.

## 2023-04-03 ENCOUNTER — TELEPHONE (OUTPATIENT)
Dept: FAMILY MEDICINE CLINIC | Age: 83
End: 2023-04-03

## 2023-04-03 NOTE — TELEPHONE ENCOUNTER
Caller: Quincy Roberts    Relationship: Self    Best call back number: [phone]    Who are you requesting to speak with (clinical staff, provider,  specific staff member): CLINICAL    What was the call regarding: PATIENT STATES HE TOOK HIS LAST STEROID TABLET TODAY, AND HIS PAIN HAS GONE FROM A 9 OR 10 TO A 1 OR 2. PATIENT STATES HE DOESN'T THINK HE NEEDS PAIN MANAGEMENT, BUT HE WANTS TO KNOW IF DR MATA RECOMMENDS HE STILL SEES THE PHYSICAL THERAPIST.    Do you require a callback: YES OR SEND MESSAGE ON [employer]

## 2023-05-26 ENCOUNTER — ANESTHESIA EVENT (OUTPATIENT)
Dept: GASTROENTEROLOGY | Facility: HOSPITAL | Age: 83
End: 2023-05-26
Payer: MEDICARE

## 2023-05-26 ENCOUNTER — HOSPITAL ENCOUNTER (OUTPATIENT)
Facility: HOSPITAL | Age: 83
Setting detail: HOSPITAL OUTPATIENT SURGERY
Discharge: HOME | End: 2023-05-26
Attending: INTERNAL MEDICINE | Admitting: INTERNAL MEDICINE
Payer: MEDICARE

## 2023-05-26 ENCOUNTER — ANESTHESIA (OUTPATIENT)
Dept: GASTROENTEROLOGY | Facility: HOSPITAL | Age: 83
End: 2023-05-26
Payer: MEDICARE

## 2023-05-26 VITALS
HEART RATE: 51 BPM | WEIGHT: 178 LBS | OXYGEN SATURATION: 97 % | BODY MASS INDEX: 25.48 KG/M2 | HEIGHT: 70 IN | RESPIRATION RATE: 17 BRPM | DIASTOLIC BLOOD PRESSURE: 71 MMHG | SYSTOLIC BLOOD PRESSURE: 115 MMHG

## 2023-05-26 DIAGNOSIS — D12.6 ADENOMATOUS POLYP OF COLON, UNSPECIFIED PART OF COLON: ICD-10-CM

## 2023-05-26 PROCEDURE — 25010000002 PROPOFOL 10 MG/ML EMULSION: Performed by: REGISTERED NURSE

## 2023-05-26 PROCEDURE — 45385 COLONOSCOPY W/LESION REMOVAL: CPT | Performed by: INTERNAL MEDICINE

## 2023-05-26 PROCEDURE — 88305 TISSUE EXAM BY PATHOLOGIST: CPT | Performed by: INTERNAL MEDICINE

## 2023-05-26 PROCEDURE — S0260 H&P FOR SURGERY: HCPCS | Performed by: INTERNAL MEDICINE

## 2023-05-26 RX ORDER — LIDOCAINE HYDROCHLORIDE 20 MG/ML
INJECTION, SOLUTION INFILTRATION; PERINEURAL AS NEEDED
Status: DISCONTINUED | OUTPATIENT
Start: 2023-05-26 | End: 2023-05-26 | Stop reason: SURG

## 2023-05-26 RX ORDER — GLYCOPYRROLATE 0.2 MG/ML
INJECTION INTRAMUSCULAR; INTRAVENOUS AS NEEDED
Status: DISCONTINUED | OUTPATIENT
Start: 2023-05-26 | End: 2023-05-26 | Stop reason: SURG

## 2023-05-26 RX ORDER — PROPOFOL 10 MG/ML
VIAL (ML) INTRAVENOUS AS NEEDED
Status: DISCONTINUED | OUTPATIENT
Start: 2023-05-26 | End: 2023-05-26 | Stop reason: SURG

## 2023-05-26 RX ORDER — ONDANSETRON 2 MG/ML
4 INJECTION INTRAMUSCULAR; INTRAVENOUS ONCE AS NEEDED
Status: DISCONTINUED | OUTPATIENT
Start: 2023-05-26 | End: 2023-05-26 | Stop reason: HOSPADM

## 2023-05-26 RX ORDER — SODIUM CHLORIDE 0.9 % (FLUSH) 0.9 %
10 SYRINGE (ML) INJECTION AS NEEDED
Status: DISCONTINUED | OUTPATIENT
Start: 2023-05-26 | End: 2023-05-26 | Stop reason: HOSPADM

## 2023-05-26 RX ORDER — SODIUM CHLORIDE, SODIUM LACTATE, POTASSIUM CHLORIDE, CALCIUM CHLORIDE 600; 310; 30; 20 MG/100ML; MG/100ML; MG/100ML; MG/100ML
1000 INJECTION, SOLUTION INTRAVENOUS CONTINUOUS
Status: DISCONTINUED | OUTPATIENT
Start: 2023-05-26 | End: 2023-05-26 | Stop reason: HOSPADM

## 2023-05-26 RX ADMIN — PROPOFOL 140 MCG/KG/MIN: 10 INJECTION, EMULSION INTRAVENOUS at 12:20

## 2023-05-26 RX ADMIN — GLYCOPYRROLATE 0.2 MG: 0.2 INJECTION INTRAMUSCULAR; INTRAVENOUS at 12:18

## 2023-05-26 RX ADMIN — PROPOFOL 30 MG: 10 INJECTION, EMULSION INTRAVENOUS at 12:26

## 2023-05-26 RX ADMIN — SODIUM CHLORIDE, POTASSIUM CHLORIDE, SODIUM LACTATE AND CALCIUM CHLORIDE: 600; 310; 30; 20 INJECTION, SOLUTION INTRAVENOUS at 12:38

## 2023-05-26 RX ADMIN — LIDOCAINE HYDROCHLORIDE 50 MG: 20 INJECTION, SOLUTION INFILTRATION; PERINEURAL at 12:18

## 2023-05-26 RX ADMIN — PROPOFOL 50 MG: 10 INJECTION, EMULSION INTRAVENOUS at 12:18

## 2023-05-26 RX ADMIN — SODIUM CHLORIDE, POTASSIUM CHLORIDE, SODIUM LACTATE AND CALCIUM CHLORIDE 1000 ML: 600; 310; 30; 20 INJECTION, SOLUTION INTRAVENOUS at 11:41

## 2023-05-26 NOTE — ANESTHESIA POSTPROCEDURE EVALUATION
"Patient: Quincy Roberts    Procedure Summary     Date: 05/26/23 Room / Location:  [facility]    Anesthesia Start: 1213 Anesthesia Stop: 1244    Procedure: COLONOSCOPY to cecum/ terminal ileum  with cold snare polypectomies Diagnosis:       Diverticulosis      Polyps of both external auditory canals      Tortuous colon      (Adenomatous polyp of colon, unspecified part of colon [D12.6])    Surgeons: Valentino Stern MD Provider: Charity Corcoran MD    Anesthesia Type: MAC ASA Status: 3          Anesthesia Type: MAC    Vitals  Vitals Value Taken Time   /71 05/26/23 1301   Temp     Pulse 51 05/26/23 1301   Resp 17 05/26/23 1301   SpO2 97 % 05/26/23 1301           Post Anesthesia Care and Evaluation    Patient location during evaluation: PHASE II  Patient participation: complete - patient participated  Level of consciousness: awake  Pain management: adequate    Airway patency: patent  Anesthetic complications: No anesthetic complications    Cardiovascular status: acceptable  Respiratory status: acceptable  Hydration status: acceptable    Comments: /71 (BP Location: Left arm, Patient Position: Lying)   Pulse 51   Resp 17   Ht 177.8 cm (70\")   Wt 80.7 kg (178 lb)   SpO2 97%   BMI 25.54 kg/m²       "

## 2023-05-26 NOTE — H&P
[facility]  Pre Procedure History & Physical    Chief Complaint:   Polyps    Subjective     HPI:   82-year-old male presents the endoscopy suite for colonoscopic examination.  He has a personal history of polyps.  Last colonoscopy performed in 2018.    Past Medical History:   Past Medical History:   Diagnosis Date   • A-fib    • Anemia    • Arthritis of neck 3/21   • Diverticulosis    • GERD (gastroesophageal reflux disease)    • Health care maintenance    • History of colon polyps    • History of jaundice     Childhood   • History of prostate cancer 2014   • History of radiation therapy    • Hyperlipidemia    • Hypertension    • Kidney stones    • Low back pain    • Low back strain 3/16/23   • Osteoarthritis    • Prostate cancer 2014   • Pyogenic arthritis of left knee joint        Past Surgical History:  Past Surgical History:   Procedure Laterality Date   • CARDIAC CATHETERIZATION     • CARDIAC ELECTROPHYSIOLOGY PROCEDURE N/A 11/23/2020    Procedure: Ablation atrial fibrillation;  Surgeon: Brandyn Olivier MD;  Location: [facility] CATH INVASIVE LOCATION;  Service: Cardiovascular;  Laterality: N/A;   • COLONOSCOPY N/A 08/10/2018    Procedure: COLONOSCOPY INTO CECUM AND TERMINAL ILEUM;  Surgeon: Valentino Stern MD;  Location: [facility] ENDOSCOPY;  Service: Gastroenterology   • ENDOSCOPY N/A 08/02/2017    Procedure: ESOPHAGOGASTRODUODENOSCOPY WITH COLD BIOPSIES;  Surgeon: Valentino PEÑA MD;  Location: [facility] ENDOSCOPY;  Service:    • HERNIA REPAIR  2010   • ROTATOR CUFF REPAIR Left 2003   • SHOULDER SURGERY      Rotator cuff   • TOTAL HIP ARTHROPLASTY Right 2011   • TRIGGER POINT INJECTION         Family History:  Family History   Problem Relation Age of Onset   • Breast cancer Mother 70   • Dementia Mother    • Cancer Mother    • Malig Hyperthermia Neg Hx        Social History:   reports that he has never smoked. He has never used smokeless tobacco. He reports that he does not  drink alcohol and does not use drugs.    Medications:   No medications prior to admission.       Allergies:  Metoclopramide, Carvedilol, Hctz [hydrochlorothiazide], Bystolic [nebivolol hcl], and Spironolactone    ROS:    Pertinent items are noted in HPI, all other systems reviewed and negative     Objective     There were no vitals taken for this visit.    Physical Exam   Constitutional: Pt is oriented to person, place, and time and well-developed, well-nourished, and in no distress.   Mouth/Throat: Oropharynx is clear and moist.   Neck: Normal range of motion.   Cardiovascular: Normal rate, regular rhythm and normal heart sounds.    Pulmonary/Chest: Effort normal and breath sounds normal.   Abdominal: Soft. Nontender  Skin: Skin is warm and dry.   Psychiatric: Mood, memory, affect and judgment normal.     Assessment & Plan     Diagnosis:  Polyps    Anticipated Surgical Procedure:  Colonoscopy  The risks, benefits, and alternatives of this procedure have been discussed with the patient or the responsible party- the patient understands and agrees to proceed.

## 2023-05-26 NOTE — ANESTHESIA PREPROCEDURE EVALUATION
Anesthesia Evaluation     Patient summary reviewed and Nursing notes reviewed                Airway   Mallampati: II  Dental          Pulmonary - negative pulmonary ROS and normal exam   (-) not a smoker  Cardiovascular - normal exam    ECG reviewed    (+) hypertension, valvular problems/murmurs murmur, dysrhythmias Paroxysmal Atrial Fib, hyperlipidemia,     ROS comment: Reviewed echo:    ? Saline test results are negative.  ? Estimated left ventricular EF = 65% Left ventricular systolic function is normal.  ? Left ventricular diastolic function was normal.  ? Mild tricuspid valve regurgitation is present. Estimated right ventricular systolic pressure from tricuspid regurgitation is normal (<35 mmHg).         Neuro/Psych  (+) numbness,    GI/Hepatic/Renal/Endo    (+)  GERD,  thyroid problem hypothyroidism    Musculoskeletal     Abdominal    Substance History      OB/GYN          Other   arthritis,    history of cancer                    Anesthesia Plan    ASA 3     MAC       Anesthetic plan, risks, benefits, and alternatives have been provided, discussed and informed consent has been obtained with: patient.        CODE STATUS:

## 2023-05-31 LAB
LAB AP CASE REPORT: NORMAL
PATH REPORT.FINAL DX SPEC: NORMAL
PATH REPORT.GROSS SPEC: NORMAL

## 2023-06-01 DIAGNOSIS — I10 HYPERTENSION, ESSENTIAL: ICD-10-CM

## 2023-06-01 RX ORDER — AMLODIPINE BESYLATE 10 MG/1
TABLET ORAL
Qty: 90 TABLET | Refills: 0 | Status: SHIPPED | OUTPATIENT
Start: 2023-06-01

## 2023-06-01 NOTE — TELEPHONE ENCOUNTER
Rx Refill Note  Requested Prescriptions     Pending Prescriptions Disp Refills   • amLODIPine (NORVASC) 10 MG tablet [Pharmacy Med Name: AMLODIPINE BESYLATE 10 MG Tablet] 90 tablet 0     Sig: TAKE 1 TABLET EVERY DAY      Last office visit with prescribing clinician: 3/28/2023     Next office visit with prescribing clinician: 9/19/2023       {Aleja King LPN  06/01/23, 10:21 EDT

## 2023-06-13 ENCOUNTER — TELEPHONE (OUTPATIENT)
Dept: GASTROENTEROLOGY | Facility: CLINIC | Age: 83
End: 2023-06-13
Payer: MEDICARE

## 2023-06-13 NOTE — TELEPHONE ENCOUNTER
----- Message from Valentino PEÑA MD sent at 6/11/2023  2:10 PM EDT -----  Regarding: Biopsy results  Okay to call results, would recommend patient follow-up in this office as needed, otherwise can return to office in 5 years to discuss risk-benefit ratio of further evaluation.  ----- Message -----  From: Lab, Background User  Sent: 5/31/2023   7:42 AM EDT  To: Valentino PEÑA MD

## 2023-06-13 NOTE — TELEPHONE ENCOUNTER
Call to pt.  Advise per path report that polyps that were removed were not cancerous, but precancerous.     Advise per Dr Stern's note.  Verb understanding.     C/s for 5/26/28 placed in recall and HM.

## 2023-08-11 RX ORDER — LOSARTAN POTASSIUM 50 MG/1
TABLET ORAL
Qty: 180 TABLET | Refills: 0 | Status: SHIPPED | OUTPATIENT
Start: 2023-08-11

## 2023-09-11 ENCOUNTER — TELEPHONE (OUTPATIENT)
Dept: FAMILY MEDICINE CLINIC | Age: 83
End: 2023-09-11

## 2023-09-11 DIAGNOSIS — I10 HYPERTENSION, ESSENTIAL: ICD-10-CM

## 2023-09-11 DIAGNOSIS — E03.9 ACQUIRED HYPOTHYROIDISM: ICD-10-CM

## 2023-09-11 DIAGNOSIS — E78.2 MIXED HYPERLIPIDEMIA: Primary | ICD-10-CM

## 2023-09-11 DIAGNOSIS — Z85.46 HISTORY OF PROSTATE CANCER: ICD-10-CM

## 2023-09-11 NOTE — TELEPHONE ENCOUNTER
Caller: Quincy Roberts    Relationship: Self    Best call back number: [phone]     What orders are you requesting (i.e. lab or imaging): LABS FOR MEDICARE VISIT    In what timeframe would the patient need to come in: ASAP    Where will you receive your lab/imaging services: [facility]    Additional notes: PATIENT STATED THAT IF NEEDS TO HAVE LAB WORK FOR HIS APPOINTMENT .23 TO GO AHEAD AND PUT IN THE ORDERS SO HE CAN DISCUSS RESULTS AT THE APPOINTMENT. PLEASE CALL WHEN THESE ARE ACTIVE.

## 2023-09-14 ENCOUNTER — LAB (OUTPATIENT)
Dept: LAB | Facility: HOSPITAL | Age: 83
End: 2023-09-14
Payer: MEDICARE

## 2023-09-14 DIAGNOSIS — I10 HYPERTENSION, ESSENTIAL: ICD-10-CM

## 2023-09-14 DIAGNOSIS — Z85.46 HISTORY OF PROSTATE CANCER: ICD-10-CM

## 2023-09-14 DIAGNOSIS — E03.9 ACQUIRED HYPOTHYROIDISM: ICD-10-CM

## 2023-09-14 DIAGNOSIS — E78.2 MIXED HYPERLIPIDEMIA: ICD-10-CM

## 2023-09-14 LAB
ALBUMIN SERPL-MCNC: 4.2 G/DL (ref 3.5–5.2)
ALBUMIN/GLOB SERPL: 1.6 G/DL
ALP SERPL-CCNC: 79 U/L (ref 39–117)
ALT SERPL W P-5'-P-CCNC: 13 U/L (ref 1–41)
ANION GAP SERPL CALCULATED.3IONS-SCNC: 7 MMOL/L (ref 5–15)
AST SERPL-CCNC: 17 U/L (ref 1–40)
BILIRUB SERPL-MCNC: 0.9 MG/DL (ref 0–1.2)
BUN SERPL-MCNC: 18 MG/DL (ref 8–23)
BUN/CREAT SERPL: 15.5 (ref 7–25)
CALCIUM SPEC-SCNC: 9.5 MG/DL (ref 8.6–10.5)
CHLORIDE SERPL-SCNC: 106 MMOL/L (ref 98–107)
CHOLEST SERPL-MCNC: 105 MG/DL (ref 0–200)
CO2 SERPL-SCNC: 25 MMOL/L (ref 22–29)
CREAT SERPL-MCNC: 1.16 MG/DL (ref 0.76–1.27)
EGFRCR SERPLBLD CKD-EPI 2021: 62.9 ML/MIN/1.73
GLOBULIN UR ELPH-MCNC: 2.7 GM/DL
GLUCOSE SERPL-MCNC: 127 MG/DL (ref 65–99)
HDLC SERPL-MCNC: 40 MG/DL (ref 40–60)
LDLC SERPL CALC-MCNC: 54 MG/DL (ref 0–100)
LDLC/HDLC SERPL: 1.4 {RATIO}
POTASSIUM SERPL-SCNC: 4 MMOL/L (ref 3.5–5.2)
PROT SERPL-MCNC: 6.9 G/DL (ref 6–8.5)
PSA SERPL-MCNC: 0.52 NG/ML (ref 0–4)
SODIUM SERPL-SCNC: 138 MMOL/L (ref 136–145)
TRIGL SERPL-MCNC: 45 MG/DL (ref 0–150)
TSH SERPL DL<=0.05 MIU/L-ACNC: 3.58 UIU/ML (ref 0.27–4.2)
VLDLC SERPL-MCNC: 11 MG/DL (ref 5–40)

## 2023-09-14 PROCEDURE — 36415 COLL VENOUS BLD VENIPUNCTURE: CPT

## 2023-09-14 PROCEDURE — 80053 COMPREHEN METABOLIC PANEL: CPT

## 2023-09-14 PROCEDURE — 84153 ASSAY OF PSA TOTAL: CPT

## 2023-09-14 PROCEDURE — 80061 LIPID PANEL: CPT

## 2023-09-14 PROCEDURE — 84443 ASSAY THYROID STIM HORMONE: CPT

## 2023-09-19 ENCOUNTER — OFFICE VISIT (OUTPATIENT)
Dept: FAMILY MEDICINE CLINIC | Age: 83
End: 2023-09-19
Payer: MEDICARE

## 2023-09-19 VITALS
TEMPERATURE: 98.1 F | WEIGHT: 186.6 LBS | BODY MASS INDEX: 26.71 KG/M2 | SYSTOLIC BLOOD PRESSURE: 132 MMHG | HEART RATE: 54 BPM | DIASTOLIC BLOOD PRESSURE: 54 MMHG | HEIGHT: 70 IN

## 2023-09-19 DIAGNOSIS — E03.9 ACQUIRED HYPOTHYROIDISM: ICD-10-CM

## 2023-09-19 DIAGNOSIS — N40.1 BENIGN PROSTATIC HYPERPLASIA WITH NOCTURIA: ICD-10-CM

## 2023-09-19 DIAGNOSIS — R35.1 BENIGN PROSTATIC HYPERPLASIA WITH NOCTURIA: ICD-10-CM

## 2023-09-19 DIAGNOSIS — E78.2 MIXED HYPERLIPIDEMIA: ICD-10-CM

## 2023-09-19 DIAGNOSIS — I10 HYPERTENSION, ESSENTIAL: ICD-10-CM

## 2023-09-19 DIAGNOSIS — Z00.00 MEDICARE ANNUAL WELLNESS VISIT, SUBSEQUENT: Primary | ICD-10-CM

## 2023-09-19 DIAGNOSIS — R73.09 ABNORMAL GLUCOSE: ICD-10-CM

## 2023-09-19 DIAGNOSIS — Z23 NEED FOR VACCINATION: ICD-10-CM

## 2023-09-19 LAB
EXPIRATION DATE: NORMAL
HBA1C MFR BLD: 4.8 %
Lab: NORMAL

## 2023-09-19 RX ORDER — DOXAZOSIN MESYLATE 4 MG/1
TABLET ORAL
Qty: 135 TABLET | Refills: 1 | Status: SHIPPED | OUTPATIENT
Start: 2023-09-19

## 2023-09-19 RX ORDER — FINASTERIDE 5 MG/1
5 TABLET, FILM COATED ORAL DAILY
Qty: 90 TABLET | Refills: 1 | Status: SHIPPED | OUTPATIENT
Start: 2023-09-19

## 2023-09-19 NOTE — ASSESSMENT & PLAN NOTE
Add finasteride to see if we can improve his symptoms.  Educated him on appropriate expectations for medication

## 2023-09-19 NOTE — PROGRESS NOTES
The ABCs of the Annual Wellness Visit  Subsequent Medicare Wellness Visit    Subjective    Quincy Robetrs is a 82 y.o. male who presents for a Subsequent Medicare Wellness Visit.    The following portions of the patient's history were reviewed and   updated as appropriate: allergies, current medications, past family history, past medical history, past social history, past surgical history, and problem list.    Compared to one year ago, the patient feels his physical   health is better.    Compared to one year ago, the patient feels his mental   health is the same.    Recent Hospitalizations:  He was not admitted to the hospital during the last year.       Current Medical Providers:  Patient Care Team:  Jose Valle MD as PCP - General  Jose Valle MD as Referring Physician (Family Medicine)  Adolph Martell MD as Consulting Physician (Hematology and Oncology)  Elmira Cabezas MD as Consulting Physician (Cardiology)    Outpatient Medications Prior to Visit   Medication Sig Dispense Refill    Acetaminophen (TYLENOL PO) Take 1 tablet by mouth As Needed.      amLODIPine (NORVASC) 10 MG tablet TAKE 1 TABLET EVERY DAY 90 tablet 0    coenzyme Q10 50 MG capsule capsule Take 1 capsule by mouth Daily.      esomeprazole (nexIUM) 40 MG capsule Take 1 capsule by mouth Every Morning Before Breakfast.      fluticasone (FLONASE) 50 MCG/ACT nasal spray 2 sprays into the nostril(s) as directed by provider As Needed.      Lactobacillus (PROBIOTIC ACIDOPHILUS PO) Take 1 tablet by mouth Daily.      levothyroxine (SYNTHROID, LEVOTHROID) 100 MCG tablet TAKE 1 TABLET EVERY DAY 90 tablet 1    losartan (COZAAR) 50 MG tablet Take 1 tablet by mouth twice daily 180 tablet 0    Omega-3 Fatty Acids (FISH OIL) 1000 MG capsule capsule Take 1 capsule by mouth Daily With Breakfast. Omega Q plus      doxazosin (CARDURA) 4 MG tablet Take 1 1/2 daily 135 tablet 1    Cholecalciferol (VITAMIN D3 PO) Take 1,000 mg by mouth Daily.       "methylPREDNISolone (Medrol) 4 MG dose pack Take as directed on package instructions. 1 each 0     No facility-administered medications prior to visit.       No opioid medication identified on active medication list. I have reviewed chart for other potential  high risk medication/s and harmful drug interactions in the elderly.        Aspirin is not on active medication list.  Aspirin use is not indicated based on review of current medical condition/s. Risk of harm outweighs potential benefits.  .    Patient Active Problem List   Diagnosis    Hypertension, essential    Hx of adenomatous colonic polyps    Macrocytic anemia    HLD (hyperlipidemia)    Benign prostatic hyperplasia with nocturia    Coronary-myocardial bridge    Heart murmur    Sinus bradycardia    PAF (paroxysmal atrial fibrillation)    Abnormal EKG    Primary osteoarthritis of both knees    History of prostate cancer    Acquired hypothyroidism    Simple renal cyst    History of melanoma    Rivas's esophagus with dysplasia    GERD (gastroesophageal reflux disease)    Medicare annual wellness visit, subsequent    History of cardiac radiofrequency ablation    Left foot pain    History of COVID-19    Adenomatous polyp of colon    Lumbar radiculopathy    Spinal stenosis of lumbar region without neurogenic claudication     Advance Care Planning   Advance Care Planning     Advance Directive is on file.  ACP discussion was held with the patient during this visit. Patient has an advance directive in EMR which is still valid.      Objective    Vitals:    09/19/23 0904 09/19/23 0907   BP: 141/53 132/54   BP Location: Right arm Right arm   Patient Position: Sitting Sitting   Pulse: 54    Temp: 98.1 °F (36.7 °C)    TempSrc: Oral    Weight: 84.6 kg (186 lb 9.6 oz)    Height: 177.8 cm (70\")      Estimated body mass index is 26.77 kg/m² as calculated from the following:    Height as of this encounter: 177.8 cm (70\").    Weight as of this encounter: 84.6 kg (186 lb 9.6 " oz).    BMI is >= 25 and <30. (Overweight) The following options were offered after discussion;: exercise counseling/recommendations and nutrition counseling/recommendations      Does the patient have evidence of cognitive impairment? No    Lab Results   Component Value Date    TRIG 45 2023    HDL 40 2023    LDL 54 2023    VLDL 11 2023    HGBA1C 4.8 2023        HEALTH RISK ASSESSMENT    Smoking Status:  Social History     Tobacco Use   Smoking Status Never   Smokeless Tobacco Never     Alcohol Consumption:  Social History     Substance and Sexual Activity   Alcohol Use No    Comment: caffeine use: 1-2 cups daily: decaf      Fall Risk Screen:    STEADI Fall Risk Assessment was completed, and patient is at LOW risk for falls.Assessment completed on:2023    Depression Screenin/19/2023     9:02 AM   PHQ-2/PHQ-9 Depression Screening   Little Interest or Pleasure in Doing Things 0-->not at all   Feeling Down, Depressed or Hopeless 0-->not at all   PHQ-9: Brief Depression Severity Measure Score 0       Health Habits and Functional and Cognitive Screenin/19/2023     9:02 AM   Functional & Cognitive Status   Do you have difficulty preparing food and eating? No   Do you have difficulty bathing yourself, getting dressed or grooming yourself? No   Do you have difficulty using the toilet? No   Do you have difficulty moving around from place to place? No   Do you have trouble with steps or getting out of a bed or a chair? No   Current Diet Well Balanced Diet   Dental Exam Up to date        Dental Exam Comment Aby Stauffer- [place]   Eye Exam Up to date        Eye Exam Comment Dr Benz   Exercise (times per week) 7 times per week   Current Exercises Include Walking   Do you need help using the phone?  No   Are you deaf or do you have serious difficulty hearing?  No   Do you need help to go to places out of walking distance? No   Do you need help shopping? No   Do you need  help preparing meals?  No   Do you need help with housework?  No   Do you need help with laundry? No   Do you need help taking your medications? No   Do you need help managing money? No   Do you ever drive or ride in a car without wearing a seat belt? No   Have you felt unusual stress, anger or loneliness in the last month? No   Who do you live with? Spouse   If you need help, do you have trouble finding someone available to you? No   Have you been bothered in the last four weeks by sexual problems? No   Do you have difficulty concentrating, remembering or making decisions? No       Age-appropriate Screening Schedule:  Refer to the list below for future screening recommendations based on patient's age, sex and/or medical conditions. Orders for these recommended tests are listed in the plan section. The patient has been provided with a written plan.    Health Maintenance   Topic Date Due    COVID-19 Vaccine (6 - Mixed Product series) 01/24/2022    INFLUENZA VACCINE  10/01/2023    BMI FOLLOWUP  03/24/2024    LIPID PANEL  09/14/2024    ANNUAL WELLNESS VISIT  09/19/2024    COLORECTAL CANCER SCREENING  05/26/2028    TDAP/TD VACCINES (3 - Tdap) 09/11/2030    Pneumococcal Vaccine 65+  Completed    ZOSTER VACCINE  Discontinued                  CMS Preventative Services Quick Reference  Risk Factors Identified During Encounter  None Identified  The above risks/problems have been discussed with the patient.  Pertinent information has been shared with the patient in the After Visit Summary.  An After Visit Summary and PPPS were made available to the patient.    Follow Up:   Next Medicare Wellness visit to be scheduled in 1 year.       Additional E&M Note during same encounter follows:  Patient has multiple medical problems which are significant and separately identifiable that require additional work above and beyond the Medicare Wellness Visit.      Chief Complaint  Medicare Wellness-subsequent, Hypothyroidism, and  "Hypertension    Subjective        HPI  Quincy Roberts is also being seen today for other health issues as noted below    Russ says that he is feeling great.  Stays active.  Keeping his weight under control.     Reports his blood pressures are doing really well usually never above 135.  Diastolics usually run in the 60s.  No lightheaded or dizziness.    Reports he is getting up at least 4 times every night to go the restroom.  He has had prostate cancer in the past and status post radiation treatment    He is seeing the cardiologist yearly and dermatologist every 6 months.        Objective   Vital Signs:  /54 (BP Location: Right arm, Patient Position: Sitting)   Pulse 54   Temp 98.1 °F (36.7 °C) (Oral)   Ht 177.8 cm (70\")   Wt 84.6 kg (186 lb 9.6 oz)   BMI 26.77 kg/m²     Physical Exam  Vitals and nursing note reviewed.   Constitutional:       General: He is not in acute distress.     Appearance: Normal appearance.   HENT:      Right Ear: Tympanic membrane and ear canal normal.      Left Ear: Tympanic membrane and ear canal normal.      Mouth/Throat:      Mouth: Mucous membranes are moist.      Pharynx: Oropharynx is clear. No oropharyngeal exudate.   Eyes:      General: No scleral icterus.     Conjunctiva/sclera: Conjunctivae normal.   Neck:      Vascular: No carotid bruit.   Cardiovascular:      Rate and Rhythm: Normal rate and regular rhythm.      Heart sounds: No murmur heard.    No friction rub. No gallop.   Pulmonary:      Effort: No respiratory distress.      Breath sounds: No wheezing or rales.   Abdominal:      General: Bowel sounds are normal.      Palpations: Abdomen is soft. There is no mass.      Tenderness: There is no abdominal tenderness. There is no guarding or rebound.   Musculoskeletal:         General: No swelling.      Right lower leg: No edema.      Left lower leg: No edema.   Lymphadenopathy:      Cervical: No cervical adenopathy.   Skin:     Coloration: Skin is not jaundiced.      " Findings: No lesion.      Comments: Several SKs on his torso   Neurological:      General: No focal deficit present.      Mental Status: He is alert and oriented to person, place, and time.   Psychiatric:         Mood and Affect: Mood normal.         Behavior: Behavior normal.         Thought Content: Thought content normal.         Judgment: Judgment normal.        The following data was reviewed by: Jose Valle MD on 09/19/2023:          PSA DIAGNOSTIC ONLY (09/14/2023 09:01)  Lipid panel (09/14/2023 09:01)  Comprehensive metabolic panel (09/14/2023 09:01)  TSH (09/14/2023 09:01)     Assessment and Plan   Diagnoses and all orders for this visit:    1. Medicare annual wellness visit, subsequent (Primary)  Assessment & Plan:  We reviewed the preventive service recommendations and created an individualized handout      2. Hypertension, essential  Assessment & Plan:  Blood pressure looks great continue current regimen    Orders:  -     doxazosin (CARDURA) 4 MG tablet; Take 1 1/2 daily  Dispense: 135 tablet; Refill: 1    3. Mixed hyperlipidemia  Assessment & Plan:  Reviewed recent labs and lipids look great.  Continue current regimen      4. Acquired hypothyroidism  Assessment & Plan:  Recent labs look good continue current regimen      5. Abnormal glucose  Comments:  His blood sugars been elevated last few labs also.  We will check him on a 1C today.  A1c looks great  Orders:  -     POC Glycosylated Hemoglobin (Hb A1C)    6. Benign prostatic hyperplasia with nocturia  Assessment & Plan:  Add finasteride to see if we can improve his symptoms.  Educated him on appropriate expectations for medication    Orders:  -     finasteride (Proscar) 5 MG tablet; Take 1 tablet by mouth Daily.  Dispense: 90 tablet; Refill: 1    7. Need for vaccination  Comments:  He prefers to wait until October to take his flu shot and would be eligible for his COVID vaccine at the same time             Follow Up   No follow-ups on  file.  Patient was given instructions and counseling regarding his condition or for health maintenance advice. Please see specific information pulled into the AVS if appropriate.

## 2023-10-19 ENCOUNTER — CLINICAL SUPPORT (OUTPATIENT)
Dept: FAMILY MEDICINE CLINIC | Age: 83
End: 2023-10-19
Payer: MEDICARE

## 2023-10-23 ENCOUNTER — TELEPHONE (OUTPATIENT)
Dept: FAMILY MEDICINE CLINIC | Age: 83
End: 2023-10-23

## 2023-10-23 NOTE — TELEPHONE ENCOUNTER
Spoke to pt, he says you gave him this in March when he had inflammation in his sacroiliac joint and it helped a lot.  He has been on the tractor a lot in the last 10 days and has aggravated it again, so he is asking if you will send this in for him

## 2023-10-23 NOTE — TELEPHONE ENCOUNTER
Caller: Quincy Roberts    Relationship: Self    Best call back number: [phone]    Requested Prescriptions:     methylPREDNISolone 4 MG        Pharmacy where request should be sent:  [facility] [address] [place], [place] [address] YAZMIN BROOKS [facility] - [phone]  - [phone] FX      Last office visit with prescribing clinician: 9/19/2023   Last telemedicine visit with prescribing clinician: Visit date not found   Next office visit with prescribing clinician: 3/19/2024     Additional details provided by patient:     Does the patient have less than a 3 day supply:  [x] Yes  [] No    Would you like a call back once the refill request has been completed: [x] Yes [] No    If the office needs to give you a call back, can they leave a voicemail: [x] Yes [] No    Bright Steinberg Rep   10/23/23 10:13 EDT

## 2023-10-24 DIAGNOSIS — M54.16 LUMBAR RADICULOPATHY: Primary | ICD-10-CM

## 2023-10-24 DIAGNOSIS — M48.061 SPINAL STENOSIS OF LUMBAR REGION WITHOUT NEUROGENIC CLAUDICATION: ICD-10-CM

## 2023-10-24 RX ORDER — METHYLPREDNISOLONE 4 MG/1
TABLET ORAL
Qty: 1 EACH | Refills: 0 | Status: SHIPPED | OUTPATIENT
Start: 2023-10-24

## 2023-10-30 RX ORDER — LEVOTHYROXINE SODIUM 0.1 MG/1
100 TABLET ORAL DAILY
Qty: 90 TABLET | Refills: 1 | Status: SHIPPED | OUTPATIENT
Start: 2023-10-30

## 2023-11-13 RX ORDER — LOSARTAN POTASSIUM 50 MG/1
TABLET ORAL
Qty: 180 TABLET | Refills: 0 | Status: SHIPPED | OUTPATIENT
Start: 2023-11-13

## 2023-11-17 ENCOUNTER — OFFICE VISIT (OUTPATIENT)
Dept: FAMILY MEDICINE CLINIC | Age: 83
End: 2023-11-17
Payer: MEDICARE

## 2023-11-17 VITALS
DIASTOLIC BLOOD PRESSURE: 59 MMHG | HEIGHT: 70 IN | OXYGEN SATURATION: 95 % | HEART RATE: 64 BPM | WEIGHT: 186.4 LBS | BODY MASS INDEX: 26.69 KG/M2 | SYSTOLIC BLOOD PRESSURE: 134 MMHG | TEMPERATURE: 97.9 F

## 2023-11-17 DIAGNOSIS — J01.90 ACUTE NON-RECURRENT SINUSITIS, UNSPECIFIED LOCATION: Primary | ICD-10-CM

## 2023-11-17 DIAGNOSIS — R05.1 ACUTE COUGH: ICD-10-CM

## 2023-11-17 LAB
EXPIRATION DATE: NORMAL
FLUAV AG UPPER RESP QL IA.RAPID: NOT DETECTED
FLUBV AG UPPER RESP QL IA.RAPID: NOT DETECTED
INTERNAL CONTROL: NORMAL
Lab: NORMAL
SARS-COV-2 AG UPPER RESP QL IA.RAPID: NOT DETECTED

## 2023-11-17 PROCEDURE — 3075F SYST BP GE 130 - 139MM HG: CPT | Performed by: PHYSICIAN ASSISTANT

## 2023-11-17 PROCEDURE — 1160F RVW MEDS BY RX/DR IN RCRD: CPT | Performed by: PHYSICIAN ASSISTANT

## 2023-11-17 PROCEDURE — 1159F MED LIST DOCD IN RCRD: CPT | Performed by: PHYSICIAN ASSISTANT

## 2023-11-17 PROCEDURE — 3078F DIAST BP <80 MM HG: CPT | Performed by: PHYSICIAN ASSISTANT

## 2023-11-17 PROCEDURE — 99213 OFFICE O/P EST LOW 20 MIN: CPT | Performed by: PHYSICIAN ASSISTANT

## 2023-11-17 PROCEDURE — 87428 SARSCOV & INF VIR A&B AG IA: CPT | Performed by: PHYSICIAN ASSISTANT

## 2023-11-17 RX ORDER — BENZONATATE 200 MG/1
200 CAPSULE ORAL 3 TIMES DAILY PRN
Qty: 21 CAPSULE | Refills: 0 | Status: SHIPPED | OUTPATIENT
Start: 2023-11-17

## 2023-11-17 RX ORDER — AZELASTINE 1 MG/ML
2 SPRAY, METERED NASAL 2 TIMES DAILY
Qty: 30 ML | Refills: 0 | Status: SHIPPED | OUTPATIENT
Start: 2023-11-17

## 2023-11-17 RX ORDER — AMOXICILLIN 875 MG/1
875 TABLET, COATED ORAL 2 TIMES DAILY
Qty: 20 TABLET | Refills: 0 | Status: SHIPPED | OUTPATIENT
Start: 2023-11-17 | End: 2023-11-27

## 2023-11-17 NOTE — PROGRESS NOTES
Diagnoses and all orders for this visit:    1. Acute non-recurrent sinusitis, unspecified location (Primary)  Comments:  Advised there is a very good chance this is viral and will improve without antibiotics.  Start Astelin and Tessalon Perles today.  Watchful waiting for amoxil.  Orders:  -     azelastine (ASTELIN) 0.1 % nasal spray; 2 sprays into the nostril(s) as directed by provider 2 (Two) Times a Day. Use in each nostril as directed  Dispense: 30 mL; Refill: 0  -     amoxicillin (AMOXIL) 875 MG tablet; Take 1 tablet by mouth 2 (Two) Times a Day for 10 days.  Dispense: 20 tablet; Refill: 0  -     benzonatate (TESSALON) 200 MG capsule; Take 1 capsule by mouth 3 (Three) Times a Day As Needed for Cough.  Dispense: 21 capsule; Refill: 0    2. Acute cough  -     POCT SARS-CoV-2 Antigen MERNA + Flu            Subjective     CHIEF COMPLAINT    Chief Complaint   Patient presents with    Cough     Pt c/o cough, drainage. Onset for about 4-5 days.   Pt denies C/F testing.              History of Present Illness  This is an 83-year-old male presenting to the clinic complaining of cough for the last 4 days.  He reports some sinus congestion with tenderness over his cheekbones and forehead.  His cough is productive of thick yellow to green mucus and is worse in the morning.  He also complains of right ear pain and postnasal drainage causing a sore throat.  He denies fevers, chills, chest pain or shortness of breath.  He has not had any known sick contacts recently.  He did take Coricidin cough and cold starting 2 days ago but states it has not helped very much unfortunately.            Review of Systems   Constitutional:  Negative for chills, fatigue and fever.   HENT:  Positive for congestion, ear pain (right side), postnasal drip and sore throat. Negative for rhinorrhea.    Respiratory:  Positive for cough. Negative for shortness of breath and wheezing.    Cardiovascular:  Negative for chest pain.   Gastrointestinal:   Negative for abdominal pain, diarrhea, nausea and vomiting.   Musculoskeletal:  Negative for myalgias.   Skin:  Negative for rash.   Neurological:  Negative for headaches.            Past Medical History:   Diagnosis Date    A-fib     Anemia     Arthritis of neck 3/21    Diverticulosis     GERD (gastroesophageal reflux disease)     Health care maintenance     History of colon polyps     History of jaundice     Childhood    History of prostate cancer 2014    History of radiation therapy     Hyperlipidemia     Hypertension     Kidney stones     Low back pain     Low back strain 3/16/23    Osteoarthritis     Prostate cancer 2014    Pyogenic arthritis of left knee joint             Past Surgical History:   Procedure Laterality Date    CARDIAC CATHETERIZATION      CARDIAC ELECTROPHYSIOLOGY PROCEDURE N/A 11/23/2020    Procedure: Ablation atrial fibrillation;  Surgeon: Brandyn Olivier MD;  Location: [facility] CATH INVASIVE LOCATION;  Service: Cardiovascular;  Laterality: N/A;    COLONOSCOPY N/A 08/10/2018    Procedure: COLONOSCOPY INTO CECUM AND TERMINAL ILEUM;  Surgeon: Valentino Stern MD;  Location: [facility] ENDOSCOPY;  Service: Gastroenterology    COLONOSCOPY N/A 5/26/2023    Procedure: COLONOSCOPY to cecum/ terminal ileum  with cold snare polypectomies;  Surgeon: Valentino Stern MD;  Location: [facility] ENDOSCOPY;  Service: Gastroenterology;  Laterality: N/A;  pre- hx colon  polyps   post- diverticulosis, polyps, hemorrhoids     ENDOSCOPY N/A 08/02/2017    Procedure: ESOPHAGOGASTRODUODENOSCOPY WITH COLD BIOPSIES;  Surgeon: Valentino PEÑA MD;  Location: [facility] ENDOSCOPY;  Service:     HERNIA REPAIR  2010    ROTATOR CUFF REPAIR Left 2003    SHOULDER SURGERY      Rotator cuff    TOTAL HIP ARTHROPLASTY Right 2011    TRIGGER POINT INJECTION              Family History   Problem Relation Age of Onset    Breast cancer Mother 70    Dementia Mother     Cancer Mother     Malig Hyperthermia Neg Hx              Social History     Socioeconomic History    Marital status:      Spouse name: Kassidy    Years of education: High School   Tobacco Use    Smoking status: Never    Smokeless tobacco: Never   Vaping Use    Vaping Use: Never used   Substance and Sexual Activity    Alcohol use: No     Comment: caffeine use: 1-2 cups daily: decaf     Drug use: No    Sexual activity: Never            Allergies   Allergen Reactions    Metoclopramide Anaphylaxis    Carvedilol Unknown - Low Severity     Intolerant    Hctz [Hydrochlorothiazide] Itching    Bystolic [Nebivolol Hcl] Other (See Comments)     Bradycardia, fatigue, decreased energy     Spironolactone Rash     Rash and breast tenderness            Current Outpatient Medications on File Prior to Visit   Medication Sig Dispense Refill    Acetaminophen (TYLENOL PO) Take 1 tablet by mouth As Needed.      amLODIPine (NORVASC) 10 MG tablet TAKE 1 TABLET EVERY DAY 90 tablet 0    coenzyme Q10 50 MG capsule capsule Take 1 capsule by mouth Daily.      doxazosin (CARDURA) 4 MG tablet Take 1 1/2 daily 135 tablet 1    esomeprazole (nexIUM) 40 MG capsule Take 1 capsule by mouth Every Morning Before Breakfast.      finasteride (Proscar) 5 MG tablet Take 1 tablet by mouth Daily. 90 tablet 1    fluticasone (FLONASE) 50 MCG/ACT nasal spray 2 sprays into the nostril(s) as directed by provider As Needed.      Lactobacillus (PROBIOTIC ACIDOPHILUS PO) Take 1 tablet by mouth Daily.      levothyroxine (SYNTHROID, LEVOTHROID) 100 MCG tablet Take 1 tablet by mouth Daily. 90 tablet 1    losartan (COZAAR) 50 MG tablet Take 1 tablet by mouth twice daily 180 tablet 0    Omega-3 Fatty Acids (FISH OIL) 1000 MG capsule capsule Take 1 capsule by mouth Daily With Breakfast. Omega Q plus      [DISCONTINUED] methylPREDNISolone (Medrol) 4 MG dose pack Take as directed on package instructions. (Patient not taking: Reported on 11/17/2023) 1 each 0     No current facility-administered medications on file prior to  "visit.            /59 (BP Location: Left arm, Patient Position: Sitting)   Pulse 64   Temp 97.9 °F (36.6 °C) (Oral)   Ht 177.8 cm (70\")   Wt 84.6 kg (186 lb 6.4 oz)   SpO2 95% Comment: room air  BMI 26.75 kg/m²          Objective     Physical Exam  Vitals and nursing note reviewed.   Constitutional:       General: He is not in acute distress.     Appearance: Normal appearance.   HENT:      Head: Normocephalic and atraumatic.      Right Ear: Tympanic membrane, ear canal and external ear normal.      Left Ear: Tympanic membrane, ear canal and external ear normal.      Nose: No congestion or rhinorrhea.      Mouth/Throat:      Mouth: Mucous membranes are moist.      Pharynx: Oropharynx is clear. Posterior oropharyngeal erythema (post nasal drainage) present.   Eyes:      Extraocular Movements: Extraocular movements intact.      Conjunctiva/sclera: Conjunctivae normal.      Pupils: Pupils are equal, round, and reactive to light.   Cardiovascular:      Rate and Rhythm: Normal rate and regular rhythm.      Heart sounds: Normal heart sounds.   Pulmonary:      Effort: Pulmonary effort is normal. No respiratory distress.      Breath sounds: Normal breath sounds. No wheezing.   Musculoskeletal:      Cervical back: Normal range of motion. No rigidity.   Skin:     General: Skin is warm and dry.   Neurological:      Mental Status: He is alert and oriented to person, place, and time.   Psychiatric:         Mood and Affect: Mood normal.         Behavior: Behavior normal.              Procedures                    Lab Results (last 24 hours)       Procedure Component Value Units Date/Time    POCT SARS-CoV-2 Antigen MERNA + Flu [[id number]]  (Normal) Collected: 11/17/23 0903    Specimen: Swab Updated: 11/17/23 0904     SARS Antigen Not Detected     Influenza A Antigen MERNA Not Detected     Influenza B Antigen MERNA Not Detected     Internal Control Passed     Lot Number [id number]     Expiration Date 9/1/2024                  No " Radiology Exams Resulted Within Past 24 Hours                    Diagnoses and all orders for this visit:    1. Acute non-recurrent sinusitis, unspecified location (Primary)  Comments:  Advised there is a very good chance this is viral and will improve without antibiotics.  Start Astelin and Tessalon Perles today.  Watchful waiting for amoxil.  Orders:  -     azelastine (ASTELIN) 0.1 % nasal spray; 2 sprays into the nostril(s) as directed by provider 2 (Two) Times a Day. Use in each nostril as directed  Dispense: 30 mL; Refill: 0  -     amoxicillin (AMOXIL) 875 MG tablet; Take 1 tablet by mouth 2 (Two) Times a Day for 10 days.  Dispense: 20 tablet; Refill: 0  -     benzonatate (TESSALON) 200 MG capsule; Take 1 capsule by mouth 3 (Three) Times a Day As Needed for Cough.  Dispense: 21 capsule; Refill: 0    2. Acute cough  -     POCT SARS-CoV-2 Antigen MERNA + Flu           FOR FULL DISCHARGE INSTRUCTIONS/COMMENTS/HANDOUTS please see the   AVS

## 2023-12-06 DIAGNOSIS — I10 HYPERTENSION, ESSENTIAL: ICD-10-CM

## 2023-12-06 RX ORDER — AMLODIPINE BESYLATE 10 MG/1
TABLET ORAL
Qty: 90 TABLET | Refills: 1 | Status: SHIPPED | OUTPATIENT
Start: 2023-12-06

## 2024-02-05 RX ORDER — LOSARTAN POTASSIUM 50 MG/1
TABLET ORAL
Qty: 180 TABLET | Refills: 0 | Status: SHIPPED | OUTPATIENT
Start: 2024-02-05

## 2024-02-19 ENCOUNTER — OFFICE VISIT (OUTPATIENT)
Age: 84
End: 2024-02-19
Payer: MEDICARE

## 2024-02-19 VITALS
WEIGHT: 186 LBS | HEART RATE: 54 BPM | BODY MASS INDEX: 26.63 KG/M2 | DIASTOLIC BLOOD PRESSURE: 70 MMHG | SYSTOLIC BLOOD PRESSURE: 126 MMHG | HEIGHT: 70 IN

## 2024-02-19 DIAGNOSIS — E78.2 MIXED HYPERLIPIDEMIA: ICD-10-CM

## 2024-02-19 DIAGNOSIS — I48.0 PAF (PAROXYSMAL ATRIAL FIBRILLATION): ICD-10-CM

## 2024-02-19 DIAGNOSIS — I10 HYPERTENSION, ESSENTIAL: ICD-10-CM

## 2024-02-19 DIAGNOSIS — Z98.890 HISTORY OF CARDIAC RADIOFREQUENCY ABLATION: ICD-10-CM

## 2024-02-19 DIAGNOSIS — R01.1 HEART MURMUR: ICD-10-CM

## 2024-02-19 DIAGNOSIS — Q24.5 CORONARY-MYOCARDIAL BRIDGE: Primary | ICD-10-CM

## 2024-02-19 NOTE — PROGRESS NOTES
Subjective:     Encounter Date:02/19/2024      Patient ID: Quincy Roberts is a 83 y.o. male.    Chief Complaint:follow up afib  History of Present Illness  This is an 84 y/o man who follows with Dr. Cabezas and is new to me today. He has a pmhx of PAF, hypertension and hyperlipidemia.     He is here today for a one year follow up visit. He has been doing very well with no complaints of chest pain, shortness of breath, palpitations, dizziness or syncope. He has had no symptoms suggestive of being in atrial fibrillation again. He reports occasional mild edema in his feet and ankles after being on them all day. This resolves with elevation. He denies orthopnea or PND. 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 Two sons and their families live next door to him.    Prior history:  He presented in 2013 with complaints of exertional chest pain, shortness of breath, and fatigue. A nuclear stress test was performed that showed no evidence of ischemia. He had a cardiac catheterization prior to this in August 2002 which showed myocardial bridging but no significant stenosis.    In 2020, he had an echocardiogram that showed a normal LV systolic function, EF 65%, normal saline contrast study and mild tricuspid insufficiency. In November 2020, he underwent pulmonary vein isolation and ablation for cavotricuspid isthmus dependent atrial flutter with Dr. Olivier. Shortly after his ablation, he presented to the hospital with palpitations and was started on amiodarone for intermittent atrial fibrillation. He was eventually weaned off amiodarone and apixaban was then stopped.    He last saw Dr. Cabezas in January 2023 and was doing well. No changes were made.    I have reviewed and updated as appropriate allergies, current medications, past family history, past medical history, past surgical history and problem list.    Review of Systems   Constitutional: Negative for fever, malaise/fatigue, weight gain and  weight loss.   HENT:  Negative for congestion, hoarse voice and sore throat.    Eyes:  Negative for blurred vision and double vision.   Cardiovascular:  Positive for leg swelling. Negative for chest pain, dyspnea on exertion, orthopnea, palpitations and syncope.   Respiratory:  Negative for cough, shortness of breath and wheezing.    Gastrointestinal:  Negative for abdominal pain, hematemesis, hematochezia and melena.   Genitourinary:  Negative for dysuria and hematuria.   Neurological:  Negative for dizziness, headaches, light-headedness and numbness.   Psychiatric/Behavioral:  Negative for depression. The patient is not nervous/anxious.          Current Outpatient Medications:     Acetaminophen (TYLENOL PO), Take 1 tablet by mouth As Needed., Disp: , Rfl:     amLODIPine (NORVASC) 10 MG tablet, TAKE 1 TABLET EVERY DAY, Disp: 90 tablet, Rfl: 1    benzonatate (TESSALON) 200 MG capsule, Take 1 capsule by mouth 3 (Three) Times a Day As Needed for Cough., Disp: 21 capsule, Rfl: 0    coenzyme Q10 50 MG capsule capsule, Take 1 capsule by mouth Daily., Disp: , Rfl:     doxazosin (CARDURA) 4 MG tablet, Take 1 1/2 daily, Disp: 135 tablet, Rfl: 1    esomeprazole (nexIUM) 40 MG capsule, Take 1 capsule by mouth Every Morning Before Breakfast., Disp: , Rfl:     finasteride (Proscar) 5 MG tablet, Take 1 tablet by mouth Daily., Disp: 90 tablet, Rfl: 1    fluticasone (FLONASE) 50 MCG/ACT nasal spray, 2 sprays into the nostril(s) as directed by provider As Needed., Disp: , Rfl:     Lactobacillus (PROBIOTIC ACIDOPHILUS PO), Take 1 tablet by mouth Daily., Disp: , Rfl:     levothyroxine (SYNTHROID, LEVOTHROID) 100 MCG tablet, Take 1 tablet by mouth Daily., Disp: 90 tablet, Rfl: 1    losartan (COZAAR) 50 MG tablet, Take 1 tablet by mouth twice daily, Disp: 180 tablet, Rfl: 0    Omega-3 Fatty Acids (FISH OIL) 1000 MG capsule capsule, Take 1 capsule by mouth Daily With Breakfast. Omega Q plus, Disp: , Rfl:     azelastine (ASTELIN) 0.1 %  nasal spray, 2 sprays into the nostril(s) as directed by provider 2 (Two) Times a Day. Use in each nostril as directed, Disp: 30 mL, Rfl: 0    Past Medical History:   Diagnosis Date    A-fib     Anemia     Arthritis of neck 3/21    Diverticulosis     GERD (gastroesophageal reflux disease)     Health care maintenance     History of colon polyps     History of jaundice     Childhood    History of prostate cancer 2014    History of radiation therapy     Hyperlipidemia     Hypertension     Kidney stones     Low back pain     Low back strain 3/16/23    Osteoarthritis     Prostate cancer 2014    Pyogenic arthritis of left knee joint        Past Surgical History:   Procedure Laterality Date    CARDIAC CATHETERIZATION      CARDIAC ELECTROPHYSIOLOGY PROCEDURE N/A 11/23/2020    Procedure: Ablation atrial fibrillation;  Surgeon: Branydn Olivier MD;  Location: [facility] CATH INVASIVE LOCATION;  Service: Cardiovascular;  Laterality: N/A;    COLONOSCOPY N/A 08/10/2018    Procedure: COLONOSCOPY INTO CECUM AND TERMINAL ILEUM;  Surgeon: Valentino Stern MD;  Location: [facility] ENDOSCOPY;  Service: Gastroenterology    COLONOSCOPY N/A 5/26/2023    Procedure: COLONOSCOPY to cecum/ terminal ileum  with cold snare polypectomies;  Surgeon: Valentino Stern MD;  Location: [facility] ENDOSCOPY;  Service: Gastroenterology;  Laterality: N/A;  pre- hx colon  polyps   post- diverticulosis, polyps, hemorrhoids     ENDOSCOPY N/A 08/02/2017    Procedure: ESOPHAGOGASTRODUODENOSCOPY WITH COLD BIOPSIES;  Surgeon: Valentino PEÑA MD;  Location: [facility] ENDOSCOPY;  Service:     HERNIA REPAIR  2010    ROTATOR CUFF REPAIR Left 2003    SHOULDER SURGERY      Rotator cuff    TOTAL HIP ARTHROPLASTY Right 2011    TRIGGER POINT INJECTION         Family History   Problem Relation Age of Onset    Breast cancer Mother 70    Dementia Mother     Cancer Mother     Malig Hyperthermia Neg Hx        Social History     Tobacco Use    Smoking status: Never     "Smokeless tobacco: Never   Vaping Use    Vaping Use: Never used   Substance Use Topics    Alcohol use: No     Comment: caffeine use: 1-2 cups daily: decaf     Drug use: No         ECG 12 Lead    Date/Time: 2/19/2024 12:31 PM  Performed by: Shruthi Diana APRN    Authorized by: Shruthi Diana APRN  Comparison: compared with previous ECG from 1/19/2023  Similar to previous ECG  Rhythm: sinus rhythm    Clinical impression: normal ECG             Objective:     Visit Vitals  Ht 177.8 cm (70\")   Wt 84.4 kg (186 lb)   BMI 26.69 kg/m²             Physical Exam  Constitutional:       Appearance: Normal appearance. He is normal weight.   HENT:      Head: Normocephalic.   Neck:      Vascular: No carotid bruit.   Cardiovascular:      Rate and Rhythm: Normal rate and regular rhythm.      Chest Wall: PMI is not displaced.      Pulses: Normal pulses.           Radial pulses are 2+ on the right side and 2+ on the left side.        Posterior tibial pulses are 2+ on the right side and 2+ on the left side.      Heart sounds: Normal heart sounds. No murmur heard.     No friction rub. No gallop.   Pulmonary:      Effort: Pulmonary effort is normal.      Breath sounds: Normal breath sounds.   Abdominal:      General: Bowel sounds are normal. There is no distension.      Palpations: Abdomen is soft.   Musculoskeletal:      Right lower leg: No edema.      Left lower leg: No edema.   Skin:     General: Skin is warm and dry.      Capillary Refill: Capillary refill takes less than 2 seconds.   Neurological:      Mental Status: He is alert and oriented to person, place, and time.   Psychiatric:         Mood and Affect: Mood normal.         Behavior: Behavior normal.         Thought Content: Thought content normal.          Lab Review:   Lipid Panel          9/14/2023    09:01   Lipid Panel   Total Cholesterol 105    Triglycerides 45    HDL Cholesterol 40    VLDL Cholesterol 11    LDL Cholesterol  54    LDL/HDL Ratio 1.40          Cardiac " Procedures:       Assessment:         Diagnoses and all orders for this visit:    1. Coronary-myocardial bridge (Primary)    2. Heart murmur    3. History of cardiac radiofrequency ablation    4. Mixed hyperlipidemia    5. Hypertension, essential    6. PAF (paroxysmal atrial fibrillation)            Plan:      PAF: s/p ablation in November 2020. Remains in sinus rhythm off amiodarone and apixaban.  HTN: blood pressure well controlled on current regimen. No changes.  HLD: lipid panel stable on review of labs in September 2023.   History of prostate cancer: s/p radiation in 2015.  Hypothyroidism: on levothyroxine.    Thank you for allowing me to participate in this patient's care. Please call with any questions or concerns. Mr. Roberts will follow up with Dr. Cabezas in 1 year.          Your medication list            Accurate as of February 19, 2024 10:24 AM. If you have any questions, ask your nurse or doctor.                CONTINUE taking these medications        Instructions Last Dose Given Next Dose Due   amLODIPine 10 MG tablet  Commonly known as: NORVASC      TAKE 1 TABLET EVERY DAY       azelastine 0.1 % nasal spray  Commonly known as: ASTELIN      2 sprays into the nostril(s) as directed by provider 2 (Two) Times a Day. Use in each nostril as directed       benzonatate 200 MG capsule  Commonly known as: TESSALON      Take 1 capsule by mouth 3 (Three) Times a Day As Needed for Cough.       coenzyme Q10 50 MG capsule capsule      Take 1 capsule by mouth Daily.       doxazosin 4 MG tablet  Commonly known as: CARDURA      Take 1 1/2 daily       esomeprazole 40 MG capsule  Commonly known as: nexIUM      Take 1 capsule by mouth Every Morning Before Breakfast.       finasteride 5 MG tablet  Commonly known as: Proscar      Take 1 tablet by mouth Daily.       fish oil 1000 MG capsule capsule      Take 1 capsule by mouth Daily With Breakfast. Omega Q plus       fluticasone 50 MCG/ACT nasal spray  Commonly known as:  FLONASE      2 sprays into the nostril(s) as directed by provider As Needed.       levothyroxine 100 MCG tablet  Commonly known as: SYNTHROID, LEVOTHROID      Take 1 tablet by mouth Daily.       losartan 50 MG tablet  Commonly known as: COZAAR      Take 1 tablet by mouth twice daily       PROBIOTIC ACIDOPHILUS PO      Take 1 tablet by mouth Daily.       TYLENOL PO      Take 1 tablet by mouth As Needed.                  Shruthi Diana, JANNY  02/19/24  10:24 AM EST

## 2024-02-20 DIAGNOSIS — I10 HYPERTENSION, ESSENTIAL: ICD-10-CM

## 2024-02-21 RX ORDER — DOXAZOSIN MESYLATE 4 MG/1
TABLET ORAL
Qty: 135 TABLET | Refills: 1 | Status: SHIPPED | OUTPATIENT
Start: 2024-02-21

## 2024-03-12 ENCOUNTER — TELEPHONE (OUTPATIENT)
Dept: FAMILY MEDICINE CLINIC | Age: 84
End: 2024-03-12
Payer: MEDICARE

## 2024-03-12 DIAGNOSIS — E03.9 ACQUIRED HYPOTHYROIDISM: ICD-10-CM

## 2024-03-12 DIAGNOSIS — I10 HYPERTENSION, ESSENTIAL: Primary | ICD-10-CM

## 2024-03-12 DIAGNOSIS — E78.2 MIXED HYPERLIPIDEMIA: ICD-10-CM

## 2024-03-12 NOTE — TELEPHONE ENCOUNTER
Caller: Quincy Roberts    Relationship: Self    Best call back number: [phone]     What orders are you requesting (i.e. lab or imaging): ROUTINE LABS- PSA IS NOT REQUIRED- DONE BY UROLOGY 3.11.2024    In what timeframe would the patient need to come in: PRIOR TO APPOINTMENT ON 3.19.2024    Where will you receive your lab/imaging services: [facility]    Additional notes: PLEASE ADVISE ONCE IN SO PATIENT CAN GET LABS DONE.

## 2024-03-14 ENCOUNTER — LAB (OUTPATIENT)
Dept: LAB | Facility: HOSPITAL | Age: 84
End: 2024-03-14
Payer: MEDICARE

## 2024-03-14 DIAGNOSIS — I10 HYPERTENSION, ESSENTIAL: ICD-10-CM

## 2024-03-14 DIAGNOSIS — E78.2 MIXED HYPERLIPIDEMIA: ICD-10-CM

## 2024-03-14 DIAGNOSIS — E03.9 ACQUIRED HYPOTHYROIDISM: ICD-10-CM

## 2024-03-14 LAB
ALBUMIN SERPL-MCNC: 4.2 G/DL (ref 3.5–5.2)
ALBUMIN/GLOB SERPL: 1.8 G/DL
ALP SERPL-CCNC: 69 U/L (ref 39–117)
ALT SERPL W P-5'-P-CCNC: 18 U/L (ref 1–41)
ANION GAP SERPL CALCULATED.3IONS-SCNC: 9.1 MMOL/L (ref 5–15)
AST SERPL-CCNC: 17 U/L (ref 1–40)
BILIRUB SERPL-MCNC: 0.8 MG/DL (ref 0–1.2)
BUN SERPL-MCNC: 21 MG/DL (ref 8–23)
BUN/CREAT SERPL: 17.6 (ref 7–25)
CALCIUM SPEC-SCNC: 8.9 MG/DL (ref 8.6–10.5)
CHLORIDE SERPL-SCNC: 105 MMOL/L (ref 98–107)
CHOLEST SERPL-MCNC: 92 MG/DL (ref 0–200)
CO2 SERPL-SCNC: 24.9 MMOL/L (ref 22–29)
CREAT SERPL-MCNC: 1.19 MG/DL (ref 0.76–1.27)
EGFRCR SERPLBLD CKD-EPI 2021: 60.6 ML/MIN/1.73
GLOBULIN UR ELPH-MCNC: 2.3 GM/DL
GLUCOSE SERPL-MCNC: 126 MG/DL (ref 65–99)
HDLC SERPL-MCNC: 40 MG/DL (ref 40–60)
LDLC SERPL CALC-MCNC: 42 MG/DL (ref 0–100)
LDLC/HDLC SERPL: 1.14 {RATIO}
POTASSIUM SERPL-SCNC: 4.4 MMOL/L (ref 3.5–5.2)
PROT SERPL-MCNC: 6.5 G/DL (ref 6–8.5)
SODIUM SERPL-SCNC: 139 MMOL/L (ref 136–145)
TRIGL SERPL-MCNC: 32 MG/DL (ref 0–150)
TSH SERPL DL<=0.05 MIU/L-ACNC: 1.75 UIU/ML (ref 0.27–4.2)
VLDLC SERPL-MCNC: 10 MG/DL (ref 5–40)

## 2024-03-14 PROCEDURE — 84443 ASSAY THYROID STIM HORMONE: CPT

## 2024-03-14 PROCEDURE — 36415 COLL VENOUS BLD VENIPUNCTURE: CPT

## 2024-03-14 PROCEDURE — 80053 COMPREHEN METABOLIC PANEL: CPT

## 2024-03-14 PROCEDURE — 80061 LIPID PANEL: CPT

## 2024-03-19 ENCOUNTER — OFFICE VISIT (OUTPATIENT)
Dept: FAMILY MEDICINE CLINIC | Age: 84
End: 2024-03-19
Payer: MEDICARE

## 2024-03-19 VITALS
OXYGEN SATURATION: 99 % | BODY MASS INDEX: 26.97 KG/M2 | HEIGHT: 70 IN | WEIGHT: 188.4 LBS | DIASTOLIC BLOOD PRESSURE: 60 MMHG | SYSTOLIC BLOOD PRESSURE: 143 MMHG | TEMPERATURE: 98.2 F | HEART RATE: 55 BPM

## 2024-03-19 DIAGNOSIS — I48.0 PAF (PAROXYSMAL ATRIAL FIBRILLATION): ICD-10-CM

## 2024-03-19 DIAGNOSIS — I10 HYPERTENSION, ESSENTIAL: Primary | ICD-10-CM

## 2024-03-19 DIAGNOSIS — R73.09 ABNORMAL GLUCOSE: ICD-10-CM

## 2024-03-19 DIAGNOSIS — E03.9 ACQUIRED HYPOTHYROIDISM: ICD-10-CM

## 2024-03-19 DIAGNOSIS — E78.2 MIXED HYPERLIPIDEMIA: ICD-10-CM

## 2024-03-19 LAB
EXPIRATION DATE: NORMAL
HBA1C MFR BLD: 4.9 % (ref 4.5–5.7)
Lab: NORMAL

## 2024-03-19 NOTE — PROGRESS NOTES
Chief Complaint  Hypertension (6 month follow up )    Subjective          Quincy Roberts presents to [facility]  History of Present Illness    Mr. De Leon in today follow-up on his chronic health issues.    At his last visit we added finasteride to see if we can help with his nocturia which had been at 4 times per night.  He does have a prior history of prostate cancer and status post radiation treatment.  After starting on the finasteride says his nocturia is down to 2 times per night.  Just saw urology, first urology, about 2 weeks ago and his PSA level was 0.61.    Saw cardiology earlier this month with a good report.  His blood pressure is borderline but doing okay.  Rings with him a log of blood pressures look quite good.  He is tolerating his current medication.    Has a history of atrial fibrillation status post ablation November 2020.  Is off of amiodarone and Eliquis.    Current Outpatient Medications on File Prior to Visit   Medication Sig Dispense Refill    Acetaminophen (TYLENOL PO) Take 1 tablet by mouth As Needed.      amLODIPine (NORVASC) 10 MG tablet TAKE 1 TABLET EVERY DAY 90 tablet 1    benzonatate (TESSALON) 200 MG capsule Take 1 capsule by mouth 3 (Three) Times a Day As Needed for Cough. 21 capsule 0    coenzyme Q10 50 MG capsule capsule Take 1 capsule by mouth Daily.      doxazosin (CARDURA) 4 MG tablet TAKE 1 AND 1/2 TABLETS EVERY  tablet 1    esomeprazole (nexIUM) 40 MG capsule Take 1 capsule by mouth Every Morning Before Breakfast.      finasteride (Proscar) 5 MG tablet Take 1 tablet by mouth Daily. 90 tablet 1    fluticasone (FLONASE) 50 MCG/ACT nasal spray 2 sprays into the nostril(s) as directed by provider As Needed.      Lactobacillus (PROBIOTIC ACIDOPHILUS PO) Take 1 tablet by mouth Daily.      levothyroxine (SYNTHROID, LEVOTHROID) 100 MCG tablet Take 1 tablet by mouth Daily. 90 tablet 1    losartan (COZAAR) 50 MG tablet Take 1 tablet by mouth twice  "daily 180 tablet 0    Omega-3 Fatty Acids (FISH OIL) 1000 MG capsule capsule Take 1 capsule by mouth Daily With Breakfast. Omega Q plus       No current facility-administered medications on file prior to visit.       Review of Systems         Objective   Vital Signs:   /60 (BP Location: Right arm, Patient Position: Sitting, Cuff Size: Adult)   Pulse 55   Temp 98.2 °F (36.8 °C) (Temporal)   Ht 177.8 cm (70\")   Wt 85.5 kg (188 lb 6.4 oz)   SpO2 99% Comment: room air  BMI 27.03 kg/m²     Physical Exam   No acute distress  Heart is regular rate and rhythm he does have a 2-3/6 systolic murmur right upper sternal border  Lungs are bilaterally clear  Trace pedal edema little bit worse on the left than the right  Neurologic without gross lateralizing focal deficits    Result Review :   The following data was reviewed by: Jose Valle MD on 03/19/2024:    Lipid panel (03/14/2024 10:28)  TSH (03/14/2024 10:28)  Comprehensive metabolic panel (03/14/2024 10:28)              Assessment and Plan    Diagnoses and all orders for this visit:    1. Hypertension, essential (Primary)  Assessment & Plan:  Blood pressure looks okay continue current regimen       2. Mixed hyperlipidemia  Assessment & Plan:  Recent labs look good continue on current regimen       3. Acquired hypothyroidism  Assessment & Plan:  Recent thyroid test results are good continue on current dose      4. PAF (paroxysmal atrial fibrillation)  Assessment & Plan:  Status post ablation.  Currently in sinus rhythm.  No longer taking the Eliquis or amiodarone.      5. Abnormal glucose  Comments:  He does run an elevated blood sugar at times but hemoglobin A1c looks good.  Did discuss dietary recommendations.  Orders:  -     POC Glycosylated Hemoglobin (Hb A1C)        Follow Up   No follow-ups on file.  Patient was given instructions and counseling regarding his condition or for health maintenance advice. Please see specific information pulled into the " AVS if appropriate.

## 2024-03-25 ENCOUNTER — TELEPHONE (OUTPATIENT)
Dept: CARDIOLOGY | Facility: CLINIC | Age: 84
End: 2024-03-25
Payer: MEDICARE

## 2024-03-25 ENCOUNTER — OFFICE VISIT (OUTPATIENT)
Dept: CARDIOLOGY | Facility: CLINIC | Age: 84
End: 2024-03-25
Payer: MEDICARE

## 2024-03-25 ENCOUNTER — HOSPITAL ENCOUNTER (OUTPATIENT)
Dept: CARDIOLOGY | Facility: HOSPITAL | Age: 84
Discharge: HOME | End: 2024-03-25
Admitting: NURSE PRACTITIONER
Payer: MEDICARE

## 2024-03-25 VITALS
WEIGHT: 188 LBS | HEIGHT: 70 IN | SYSTOLIC BLOOD PRESSURE: 152 MMHG | DIASTOLIC BLOOD PRESSURE: 66 MMHG | HEART RATE: 58 BPM | BODY MASS INDEX: 26.92 KG/M2

## 2024-03-25 DIAGNOSIS — I48.0 PAF (PAROXYSMAL ATRIAL FIBRILLATION): Primary | ICD-10-CM

## 2024-03-25 DIAGNOSIS — R00.2 PALPITATIONS: ICD-10-CM

## 2024-03-25 DIAGNOSIS — E78.2 MIXED HYPERLIPIDEMIA: ICD-10-CM

## 2024-03-25 DIAGNOSIS — I10 HYPERTENSION, ESSENTIAL: ICD-10-CM

## 2024-03-25 LAB
ALBUMIN SERPL-MCNC: 4.4 G/DL (ref 3.5–5.2)
ALBUMIN/GLOB SERPL: 1.4 G/DL
ALP SERPL-CCNC: 81 U/L (ref 39–117)
ALT SERPL W P-5'-P-CCNC: 14 U/L (ref 1–41)
ANION GAP SERPL CALCULATED.3IONS-SCNC: 12.9 MMOL/L (ref 5–15)
AST SERPL-CCNC: 20 U/L (ref 1–40)
BILIRUB SERPL-MCNC: 0.7 MG/DL (ref 0–1.2)
BUN SERPL-MCNC: 18 MG/DL (ref 8–23)
BUN/CREAT SERPL: 18 (ref 7–25)
CALCIUM SPEC-SCNC: 9.8 MG/DL (ref 8.6–10.5)
CHLORIDE SERPL-SCNC: 104 MMOL/L (ref 98–107)
CO2 SERPL-SCNC: 21.1 MMOL/L (ref 22–29)
CREAT SERPL-MCNC: 1 MG/DL (ref 0.76–1.27)
DEPRECATED RDW RBC AUTO: 51.2 FL (ref 37–54)
EGFRCR SERPLBLD CKD-EPI 2021: 74.7 ML/MIN/1.73
ERYTHROCYTE [DISTWIDTH] IN BLOOD BY AUTOMATED COUNT: 13.5 % (ref 12.3–15.4)
GLOBULIN UR ELPH-MCNC: 3.1 GM/DL
GLUCOSE SERPL-MCNC: 116 MG/DL (ref 65–99)
HCT VFR BLD AUTO: 33.6 % (ref 37.5–51)
HGB BLD-MCNC: 11.7 G/DL (ref 13–17.7)
MCH RBC QN AUTO: 36.1 PG (ref 26.6–33)
MCHC RBC AUTO-ENTMCNC: 34.8 G/DL (ref 31.5–35.7)
MCV RBC AUTO: 103.7 FL (ref 79–97)
PLATELET # BLD AUTO: 166 10*3/MM3 (ref 140–450)
PMV BLD AUTO: 10.8 FL (ref 6–12)
POTASSIUM SERPL-SCNC: 4.2 MMOL/L (ref 3.5–5.2)
PROT SERPL-MCNC: 7.5 G/DL (ref 6–8.5)
RBC # BLD AUTO: 3.24 10*6/MM3 (ref 4.14–5.8)
SODIUM SERPL-SCNC: 138 MMOL/L (ref 136–145)
TSH SERPL DL<=0.05 MIU/L-ACNC: 1.89 UIU/ML (ref 0.27–4.2)
WBC NRBC COR # BLD AUTO: 3 10*3/MM3 (ref 3.4–10.8)

## 2024-03-25 PROCEDURE — 84443 ASSAY THYROID STIM HORMONE: CPT | Performed by: NURSE PRACTITIONER

## 2024-03-25 PROCEDURE — 3077F SYST BP >= 140 MM HG: CPT | Performed by: NURSE PRACTITIONER

## 2024-03-25 PROCEDURE — 80053 COMPREHEN METABOLIC PANEL: CPT | Performed by: NURSE PRACTITIONER

## 2024-03-25 PROCEDURE — 3078F DIAST BP <80 MM HG: CPT | Performed by: NURSE PRACTITIONER

## 2024-03-25 PROCEDURE — 1160F RVW MEDS BY RX/DR IN RCRD: CPT | Performed by: NURSE PRACTITIONER

## 2024-03-25 PROCEDURE — 36415 COLL VENOUS BLD VENIPUNCTURE: CPT

## 2024-03-25 PROCEDURE — 1159F MED LIST DOCD IN RCRD: CPT | Performed by: NURSE PRACTITIONER

## 2024-03-25 PROCEDURE — 93000 ELECTROCARDIOGRAM COMPLETE: CPT | Performed by: NURSE PRACTITIONER

## 2024-03-25 PROCEDURE — 99214 OFFICE O/P EST MOD 30 MIN: CPT | Performed by: NURSE PRACTITIONER

## 2024-03-25 PROCEDURE — 85027 COMPLETE CBC AUTOMATED: CPT | Performed by: NURSE PRACTITIONER

## 2024-03-25 NOTE — TELEPHONE ENCOUNTER
RM pt-LOV with Shruthi was 2/19/24 for his 1 year follow up. He has hx of afib and ablation, HTN, HLD, hypothyroid and hx prostate cancer.    Patient is calling because last night he was sitting in his recliner watching some basketball games when he began to feel faint, palpitations/heart skipping, and a funny sensation all over.  He checked his BP and it was 190/78 HR 71 but he said he manually felt his pulse and it was irregular.      This morning he feels better than last night, but still not at baseline.  He got up at 0600 and could tell his BP was still high he felt flushed and fatigued and just not himself.  He took his AM meds earlier than normal at 0600.  His BP at 0820 was 183/76 and 0840 was 180/70.  He denies chest pain and SOA.  He reports his BP is normally well controlled.  Appears he was doing well at his last appt in February.  He denies anything different than normal over the weekend.  No acute pain, stress, illness, dietary changes.  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     Current cardiac meds reviewed:  Doxazosin 6 mg at HS  Losartan 50 mg BID  Amlodipine 10 mg in AM    He is asking if he needs to come in for an appointment or what recommendations we have?    Thank you,  Yahaira Barnard RN  [facility]  03/25/24 09:08 EDT

## 2024-03-25 NOTE — PROGRESS NOTES
"  Date of Office Visit: 2024  Encounter Provider: JANNY Jose  Place of Service: [facility] CARDIOLOGY  Patient Name: Quincy Roberts  :1940    Chief Complaint   Patient presents with    Atrial Fibrillation   :     HPI: Quincy Roberts is a 83 y.o. male patient of Dr. Holder with hypertension, hyperlipidemia, and paroxysmal atrial fibrillation (status post ablation in ).  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  Since his ablation in , he is no longer anticoagulated.    He was last seen in the office by JANNY Leblanc in February at which time he was doing well.  No changes were made to his regimen, and he was advised to follow-up in 1 year.    He called the office this morning with complaints and was scheduled to see me.    Around 10:00 last night, he was sitting in his living room watching the basketball games when he developed a faint \"funny sensation\" with associated palpitations.  He palpated his pulse and it felt irregular.  About an hour later, he developed some chills which reportedly lasted about 30 minutes.  During all of this, he also noted his blood pressures were elevated, between the 170s and 190 systolic.  Overall he feels better today.  He still feels \"draggy.\"  He is also still experiencing palpitations and skipped beats.  He denies any shortness of breath, chest pain, edema, or syncope.    Past Medical History:   Diagnosis Date    A-fib     Anemia     Arthritis of neck 3/21    Diverticulosis     GERD (gastroesophageal reflux disease)     Health care maintenance     History of colon polyps     History of jaundice     Childhood    History of prostate cancer 2014    History of radiation therapy     Hyperlipidemia     Hypertension     Kidney stones     Low back pain     Low back strain 3/16/23    Osteoarthritis     Prostate cancer 2014    Pyogenic arthritis of left knee joint        Past Surgical " History:   Procedure Laterality Date    CARDIAC CATHETERIZATION      CARDIAC ELECTROPHYSIOLOGY PROCEDURE N/A 11/23/2020    Procedure: Ablation atrial fibrillation;  Surgeon: Brandyn Olivier MD;  Location: [facility] CATH INVASIVE LOCATION;  Service: Cardiovascular;  Laterality: N/A;    COLONOSCOPY N/A 08/10/2018    Procedure: COLONOSCOPY INTO CECUM AND TERMINAL ILEUM;  Surgeon: Valentino Stren MD;  Location: [facility] ENDOSCOPY;  Service: Gastroenterology    COLONOSCOPY N/A 5/26/2023    Procedure: COLONOSCOPY to cecum/ terminal ileum  with cold snare polypectomies;  Surgeon: Valentino Stern MD;  Location: [facility] ENDOSCOPY;  Service: Gastroenterology;  Laterality: N/A;  pre- hx colon  polyps   post- diverticulosis, polyps, hemorrhoids     ENDOSCOPY N/A 08/02/2017    Procedure: ESOPHAGOGASTRODUODENOSCOPY WITH COLD BIOPSIES;  Surgeon: Valentino PEÑA MD;  Location: [facility] ENDOSCOPY;  Service:     HERNIA REPAIR  2010    ROTATOR CUFF REPAIR Left 2003    SHOULDER SURGERY      Rotator cuff    TOTAL HIP ARTHROPLASTY Right 2011    TRIGGER POINT INJECTION         Social History     Socioeconomic History    Marital status:      Spouse name: Kassidy    Years of education: High School   Tobacco Use    Smoking status: Never    Smokeless tobacco: Never   Vaping Use    Vaping status: Never Used   Substance and Sexual Activity    Alcohol use: No     Comment: caffeine use: 1-2 cups daily: decaf     Drug use: No    Sexual activity: Never       Family History   Problem Relation Age of Onset    Breast cancer Mother 70    Dementia Mother     Cancer Mother     Malig Hyperthermia Neg Hx        Review of Systems   Constitutional: Positive for chills.   Cardiovascular:  Positive for palpitations. Negative for chest pain, dyspnea on exertion, leg swelling, orthopnea, paroxysmal nocturnal dyspnea and syncope.   Respiratory: Negative.     Hematologic/Lymphatic: Negative for bleeding problem.   Musculoskeletal:  Negative for  "falls.   Gastrointestinal:  Negative for melena.   Neurological:  Positive for dizziness. Negative for light-headedness.       Allergies   Allergen Reactions    Metoclopramide Anaphylaxis    Carvedilol Unknown - Low Severity     Intolerant    Hctz [Hydrochlorothiazide] Itching    Bystolic [Nebivolol Hcl] Other (See Comments)     Bradycardia, fatigue, decreased energy     Spironolactone Rash     Rash and breast tenderness         Current Outpatient Medications:     Acetaminophen (TYLENOL PO), Take 1 tablet by mouth As Needed., Disp: , Rfl:     amLODIPine (NORVASC) 10 MG tablet, TAKE 1 TABLET EVERY DAY, Disp: 90 tablet, Rfl: 1    coenzyme Q10 50 MG capsule capsule, Take 1 capsule by mouth Daily., Disp: , Rfl:     doxazosin (CARDURA) 4 MG tablet, TAKE 1 AND 1/2 TABLETS EVERY DAY, Disp: 135 tablet, Rfl: 1    esomeprazole (nexIUM) 40 MG capsule, Take 1 capsule by mouth Every Morning Before Breakfast., Disp: , Rfl:     fluticasone (FLONASE) 50 MCG/ACT nasal spray, 2 sprays into the nostril(s) as directed by provider As Needed., Disp: , Rfl:     Lactobacillus (PROBIOTIC ACIDOPHILUS PO), Take 1 tablet by mouth Daily., Disp: , Rfl:     levothyroxine (SYNTHROID, LEVOTHROID) 100 MCG tablet, Take 1 tablet by mouth Daily., Disp: 90 tablet, Rfl: 1    losartan (COZAAR) 50 MG tablet, Take 1 tablet by mouth twice daily, Disp: 180 tablet, Rfl: 0    Omega-3 Fatty Acids (FISH OIL) 1000 MG capsule capsule, Take 1 capsule by mouth Daily With Breakfast. Omega Q plus, Disp: , Rfl:       Objective:     Vitals:    03/25/24 1218   BP: 152/66   Pulse: 58   Weight: 85.3 kg (188 lb)   Height: 177.8 cm (70\")     Body mass index is 26.98 kg/m².    PHYSICAL EXAM:    Neck:      Vascular: No JVD.   Pulmonary:      Effort: Pulmonary effort is normal.      Breath sounds: Normal breath sounds.   Cardiovascular:      Normal rate. Regular rhythm.      Murmurs: There is no murmur.      No gallop.  No click. No rub.   Pulses:     Intact distal pulses. "           ECG 12 Lead    Date/Time: 3/25/2024 12:25 PM  Performed by: Shona Fabian APRN    Authorized by: Shona Fabian APRN  Comparison: compared with previous ECG from 2/19/2024  Similar to previous ECG  Rhythm: sinus rhythm  Rate: normal  BPM: 58            Assessment:       Diagnosis Plan   1. PAF (paroxysmal atrial fibrillation)  ECG 12 Lead      2. Palpitations  Comprehensive Metabolic Panel    CBC (No Diff)    TSH    Holter Monitor - 48 Hour      3. Hypertension, essential        4. Mixed hyperlipidemia          Orders Placed This Encounter   Procedures    Comprehensive Metabolic Panel     Standing Status:   Future     Number of Occurrences:   1     Standing Expiration Date:   3/25/2025     Order Specific Question:   Release to patient     Answer:   Routine Release [[id number]]    CBC (No Diff)     Standing Status:   Future     Number of Occurrences:   1     Standing Expiration Date:   3/25/2025     Order Specific Question:   Release to patient     Answer:   Routine Release [[id number]]    TSH     Standing Status:   Future     Number of Occurrences:   1     Standing Expiration Date:   3/25/2025     Order Specific Question:   Release to patient     Answer:   Routine Release [[id number]]    Holter Monitor - 48 Hour     Standing Status:   Future     Standing Expiration Date:   3/25/2025     Order Specific Question:   Reason for exam?     Answer:   Palpitations     Order Specific Question:   Release to patient     Answer:   Routine Release [[id number]]    ECG 12 Lead     This order was created via procedure documentation     Order Specific Question:   Release to patient     Answer:   Routine Release [[id number]]          Plan:       1.  Paroxysmal atrial fibrillation.  Status post ablation in 2020.  He is no longer anticoagulated.      2.  Palpitations.  EKG today demonstrates normal sinus rhythm.  Suspect he could be experiencing PACs/PVCs.  I did recommend a 48-hour Holter.  In addition, I  recommended labs.        3.  Hypertension.  He has had some elevated readings since last night.  However, prior to that he reported excellent blood pressure control.  Recommended holding off on making any changes at this time.  He will continue to monitor.      Further recommendations will be made pending the results of the above.      As always, it has been a pleasure to participate in your patient's care.      Sincerely,         JANNY Knott

## 2024-03-27 ENCOUNTER — TELEPHONE (OUTPATIENT)
Dept: CARDIOLOGY | Facility: CLINIC | Age: 84
End: 2024-03-27
Payer: MEDICARE

## 2024-03-27 ENCOUNTER — CLINICAL SUPPORT (OUTPATIENT)
Dept: CARDIOLOGY | Facility: CLINIC | Age: 84
End: 2024-03-27
Payer: MEDICARE

## 2024-03-27 RX ORDER — HYDRALAZINE HYDROCHLORIDE 25 MG/1
25 TABLET, FILM COATED ORAL 2 TIMES DAILY
Qty: 60 TABLET | Refills: 2 | Status: SHIPPED | OUTPATIENT
Start: 2024-03-27

## 2024-03-27 NOTE — PROGRESS NOTES
Procedure   Procedures     The PT is came to check his BP. He has light tightness on his chest, palpitation, no shortness of breath. His BP on the morning was 168/65 mmHg. He also says that his HR is not regular. He is on losartan 1tab twice/d and amlodipine 1tab/d.  HR 55  /70 mmHg      JU 3/27/24

## 2024-04-17 RX ORDER — LEVOTHYROXINE SODIUM 0.1 MG/1
100 TABLET ORAL DAILY
Qty: 90 TABLET | Refills: 10 | Status: SHIPPED | OUTPATIENT
Start: 2024-04-17

## 2024-05-06 ENCOUNTER — TELEPHONE (OUTPATIENT)
Dept: CARDIOLOGY | Facility: CLINIC | Age: 84
End: 2024-05-06

## 2024-05-06 DIAGNOSIS — I10 HYPERTENSION, ESSENTIAL: ICD-10-CM

## 2024-05-06 RX ORDER — AMLODIPINE BESYLATE 10 MG/1
TABLET ORAL
Qty: 90 TABLET | Refills: 1 | Status: SHIPPED | OUTPATIENT
Start: 2024-05-06

## 2024-05-06 RX ORDER — LOSARTAN POTASSIUM 50 MG/1
TABLET ORAL
Qty: 180 TABLET | Refills: 1 | Status: SHIPPED | OUTPATIENT
Start: 2024-05-06

## 2024-05-06 NOTE — TELEPHONE ENCOUNTER
Caller: Quincy Roberts    Relationship to patient: Self    Best call back number:  [phone]    Patient is needing:  PATIENT WAS ADDED ANOTHER BP MEDICATION ON 3.25.24 . PATIENT REPORTED THESE BP NUMBERS AND THAT HE'S FEELING GOOD.   BP READINGS -   4/1     147/61  4/7       127/57  4/9       133/55  4/24     140/57  4/27     123/56  5/2       127/58

## 2024-06-17 RX ORDER — HYDRALAZINE HYDROCHLORIDE 25 MG/1
25 TABLET, FILM COATED ORAL 2 TIMES DAILY
Qty: 60 TABLET | Refills: 0 | Status: SHIPPED | OUTPATIENT
Start: 2024-06-17

## 2024-07-16 RX ORDER — HYDRALAZINE HYDROCHLORIDE 25 MG/1
25 TABLET, FILM COATED ORAL 2 TIMES DAILY
Qty: 60 TABLET | Refills: 0 | Status: SHIPPED | OUTPATIENT
Start: 2024-07-16

## 2024-08-07 DIAGNOSIS — I10 HYPERTENSION, ESSENTIAL: ICD-10-CM

## 2024-08-07 RX ORDER — DOXAZOSIN MESYLATE 4 MG/1
TABLET ORAL
Qty: 135 TABLET | Refills: 1 | Status: SHIPPED | OUTPATIENT
Start: 2024-08-07

## 2024-08-12 ENCOUNTER — OFFICE VISIT (OUTPATIENT)
Dept: FAMILY MEDICINE CLINIC | Age: 84
End: 2024-08-12
Payer: MEDICARE

## 2024-08-12 VITALS
HEIGHT: 70 IN | BODY MASS INDEX: 25.8 KG/M2 | DIASTOLIC BLOOD PRESSURE: 68 MMHG | HEART RATE: 56 BPM | OXYGEN SATURATION: 95 % | SYSTOLIC BLOOD PRESSURE: 122 MMHG | TEMPERATURE: 97.7 F | WEIGHT: 180.2 LBS

## 2024-08-12 DIAGNOSIS — B34.9 VIRAL ILLNESS: Primary | ICD-10-CM

## 2024-08-12 PROCEDURE — 3078F DIAST BP <80 MM HG: CPT | Performed by: NURSE PRACTITIONER

## 2024-08-12 PROCEDURE — 3074F SYST BP LT 130 MM HG: CPT | Performed by: NURSE PRACTITIONER

## 2024-08-12 PROCEDURE — 1160F RVW MEDS BY RX/DR IN RCRD: CPT | Performed by: NURSE PRACTITIONER

## 2024-08-12 PROCEDURE — 1125F AMNT PAIN NOTED PAIN PRSNT: CPT | Performed by: NURSE PRACTITIONER

## 2024-08-12 PROCEDURE — 1159F MED LIST DOCD IN RCRD: CPT | Performed by: NURSE PRACTITIONER

## 2024-08-12 PROCEDURE — 99213 OFFICE O/P EST LOW 20 MIN: CPT | Performed by: NURSE PRACTITIONER

## 2024-08-12 RX ORDER — LORATADINE 10 MG/1
10 TABLET ORAL DAILY
Qty: 30 TABLET | Refills: 0 | Status: SHIPPED | OUTPATIENT
Start: 2024-08-12

## 2024-08-12 RX ORDER — FLUTICASONE PROPIONATE 50 MCG
2 SPRAY, SUSPENSION (ML) NASAL DAILY
Qty: 16 G | Refills: 1 | Status: SHIPPED | OUTPATIENT
Start: 2024-08-12

## 2024-08-12 RX ORDER — HYDRALAZINE HYDROCHLORIDE 25 MG/1
25 TABLET, FILM COATED ORAL 2 TIMES DAILY
Qty: 60 TABLET | Refills: 0 | Status: SHIPPED | OUTPATIENT
Start: 2024-08-12

## 2024-08-12 NOTE — PATIENT INSTRUCTIONS
Supportive care with rest, plenty of fluids, tylenol for pain and/or fever as needed per label instructions.  You may find a cool-mist humidifier helpful as well as a sinus rinse.  Be sure to use distilled water and not tap water.  `Should you develop shortness of breath, chest pain or worsening of symptoms please go to the ER.

## 2024-08-12 NOTE — PROGRESS NOTES
"Chief Complaint  Cough (Drainage, eye pain x 3 days. Pt states he had a sinus infection (in Nov 2023) same symptoms. )    Subjective      Quincy Roberts is an 83 year old male that presents to [facility] with c/o cough, congestion and sinus pressure. Symptoms started about 3 days ago. He has been taking coricidin otc but has not seen much relief. He denies fever, body aches or chills.   No known sick contacts or exposures.     History of Present Illness     Current Outpatient Medications   Medication Instructions    Acetaminophen (TYLENOL PO) 1 tablet, Oral, As Needed    amLODIPine (NORVASC) 10 MG tablet TAKE 1 TABLET EVERY DAY    coenzyme Q10 50 mg, Oral, Daily    doxazosin (CARDURA) 4 MG tablet TAKE 1 AND 1/2 TABLETS EVERY DAY    esomeprazole (NEXIUM) 40 mg, Oral, Every Morning Before Breakfast    fish oil 1,000 mg, Oral, Daily With Breakfast, Omega Q plus    fluticasone (FLONASE) 50 MCG/ACT nasal spray 2 sprays, Nasal, As Needed    fluticasone (FLONASE) 50 MCG/ACT nasal spray 2 sprays, Nasal, Daily    hydrALAZINE (APRESOLINE) 25 mg, Oral, 2 Times Daily    Lactobacillus (PROBIOTIC ACIDOPHILUS PO) 1 tablet, Oral, Daily    levothyroxine (SYNTHROID, LEVOTHROID) 100 mcg, Oral, Daily    loratadine (CLARITIN) 10 mg, Oral, Daily    losartan (COZAAR) 50 MG tablet Take 1 tablet by mouth twice daily       The following portions of the patient's history were reviewed and updated as appropriate: allergies, current medications, past family history, past medical history, past social history, past surgical history, and problem list.    Objective   Vital Signs:   /68 (BP Location: Left arm, Patient Position: Sitting, Cuff Size: Adult)   Pulse 56   Temp 97.7 °F (36.5 °C) (Oral)   Ht 177.8 cm (70\")   Wt 81.7 kg (180 lb 3.2 oz)   SpO2 95%   BMI 25.86 kg/m²     Wt Readings from Last 3 Encounters:   08/12/24 81.7 kg (180 lb 3.2 oz)   03/25/24 85.3 kg (188 lb)   03/19/24 85.5 kg (188 lb 6.4 " oz)     BP Readings from Last 3 Encounters:   08/12/24 122/68   03/25/24 152/66   03/19/24 143/60     Physical Exam  Vitals and nursing note reviewed.   Constitutional:       Appearance: Normal appearance. He is not ill-appearing.   HENT:      Head: Normocephalic and atraumatic.      Right Ear: External ear normal.      Left Ear: External ear normal.      Nose: Congestion present.      Right Sinus: Maxillary sinus tenderness and frontal sinus tenderness present.      Left Sinus: Maxillary sinus tenderness and frontal sinus tenderness present.   Eyes:      Conjunctiva/sclera: Conjunctivae normal.      Pupils: Pupils are equal, round, and reactive to light.   Cardiovascular:      Rate and Rhythm: Normal rate and regular rhythm.      Heart sounds: Normal heart sounds.   Pulmonary:      Effort: Pulmonary effort is normal.      Breath sounds: Normal breath sounds.   Skin:     General: Skin is warm and dry.   Neurological:      General: No focal deficit present.      Mental Status: He is alert and oriented to person, place, and time.   Psychiatric:         Mood and Affect: Mood normal.         Behavior: Behavior normal.          Result Review :  The following data was reviewed by: JANNY Loredo on 08/12/2024:      Common labs          3/14/2024    10:28 3/19/2024    08:53 3/25/2024    13:00   Common Labs   Glucose 126   116    BUN 21   18    Creatinine 1.19   1.00    Sodium 139   138    Potassium 4.4   4.2    Chloride 105   104    Calcium 8.9   9.8    Albumin 4.2   4.4    Total Bilirubin 0.8   0.7    Alkaline Phosphatase 69   81    AST (SGOT) 17   20    ALT (SGPT) 18   14    WBC   3.00    Hemoglobin   11.7    Hematocrit   33.6    Platelets   166    Total Cholesterol 92      Triglycerides 32      HDL Cholesterol 40      LDL Cholesterol  42      Hemoglobin A1C  4.9         Lab Results (last 72 hours)       ** No results found for the last 72 hours. **             No Images in the past 120 days found..    Lab  Results   Component Value Date    SARSANTIGEN Not Detected 11/17/2023    COVID19 Not Detected 11/26/2020    FLUAAG Not Detected 11/17/2023    FLUBAG Not Detected 11/17/2023    RSV Not Detected 11/26/2020    INR 1.20 (H) 11/29/2020    POCGLU 125 11/23/2020       Procedures        Assessment and Plan   Diagnoses and all orders for this visit:    1. Viral illness (Primary)  -     fluticasone (FLONASE) 50 MCG/ACT nasal spray; 2 sprays into the nostril(s) as directed by provider Daily.  Dispense: 16 g; Refill: 1  -     loratadine (Claritin) 10 MG tablet; Take 1 tablet by mouth Daily.  Dispense: 30 tablet; Refill: 0      Pt declined poc testing.            There are no discontinued medications.       Follow Up   No follow-ups on file.  Patient was given instructions and counseling regarding his condition or for health maintenance advice. Please see specific information pulled into the AVS if appropriate.       JANNY Loredo  08/12/24  16:16 EDT

## 2024-08-16 ENCOUNTER — OFFICE VISIT (OUTPATIENT)
Dept: FAMILY MEDICINE CLINIC | Age: 84
End: 2024-08-16
Payer: MEDICARE

## 2024-08-16 VITALS
DIASTOLIC BLOOD PRESSURE: 52 MMHG | WEIGHT: 180.8 LBS | OXYGEN SATURATION: 96 % | BODY MASS INDEX: 25.88 KG/M2 | TEMPERATURE: 97.6 F | SYSTOLIC BLOOD PRESSURE: 133 MMHG | HEART RATE: 67 BPM | HEIGHT: 70 IN

## 2024-08-16 DIAGNOSIS — J01.00 ACUTE NON-RECURRENT MAXILLARY SINUSITIS: Primary | ICD-10-CM

## 2024-08-16 DIAGNOSIS — R05.1 ACUTE COUGH: ICD-10-CM

## 2024-08-16 PROCEDURE — 3075F SYST BP GE 130 - 139MM HG: CPT

## 2024-08-16 PROCEDURE — 87428 SARSCOV & INF VIR A&B AG IA: CPT

## 2024-08-16 PROCEDURE — 3078F DIAST BP <80 MM HG: CPT

## 2024-08-16 PROCEDURE — 1125F AMNT PAIN NOTED PAIN PRSNT: CPT

## 2024-08-16 PROCEDURE — 1160F RVW MEDS BY RX/DR IN RCRD: CPT

## 2024-08-16 PROCEDURE — 99213 OFFICE O/P EST LOW 20 MIN: CPT

## 2024-08-16 PROCEDURE — 1159F MED LIST DOCD IN RCRD: CPT

## 2024-08-16 RX ORDER — AMOXICILLIN AND CLAVULANATE POTASSIUM 875; 125 MG/1; MG/1
1 TABLET, FILM COATED ORAL 2 TIMES DAILY
Qty: 14 TABLET | Refills: 0 | Status: SHIPPED | OUTPATIENT
Start: 2024-08-16 | End: 2024-08-23

## 2024-08-16 NOTE — PROGRESS NOTES
Subjective     CHIEF COMPLAINT    Chief Complaint   Patient presents with    Cough     X 1 week.     Nasal Congestion     History of Present Illness  Patient is a 83 year old male, presenting today with complaints of cough, congestion, PND and sinus pressure. He is concerned for a sinus infection. Denies fever or chills. He has not had any SOB or chest pain. He was seen in office by JANNY Enciso on 8/12/24 with these complaints and a viral illness was suspected.  He was prescribed Claritin and Flonase.  States he has been taking these medications without relief.  Has also been taking Coricidin HBP.  [place]  Cough is productive.    Review of Systems   Constitutional:  Negative for chills and fever.   HENT:  Positive for congestion, postnasal drip and sinus pressure. Negative for rhinorrhea and sore throat.    Respiratory:  Positive for cough. Negative for shortness of breath and wheezing.    Cardiovascular:  Negative for chest pain.   Gastrointestinal:  Negative for nausea and vomiting.   Musculoskeletal:  Negative for myalgias.   Neurological:  Negative for headaches.     Past Medical History:   Diagnosis Date    A-fib     Anemia     Arthritis of neck 3/21    Diverticulosis     GERD (gastroesophageal reflux disease)     Health care maintenance     History of colon polyps     History of jaundice     Childhood    History of prostate cancer 2014    History of radiation therapy     Hyperlipidemia     Hypertension     Kidney stones     Low back pain     Low back strain 3/16/23    Osteoarthritis     Prostate cancer 2014    Pyogenic arthritis of left knee joint        Past Surgical History:   Procedure Laterality Date    CARDIAC CATHETERIZATION      CARDIAC ELECTROPHYSIOLOGY PROCEDURE N/A 11/23/2020    Procedure: Ablation atrial fibrillation;  Surgeon: Brandyn Olivier MD;  Location: [facility] INVASIVE LOCATION;  Service: Cardiovascular;  Laterality: N/A;    COLONOSCOPY N/A 08/10/2018     Procedure: COLONOSCOPY INTO CECUM AND TERMINAL ILEUM;  Surgeon: Valentino Stern MD;  Location: [facility] ENDOSCOPY;  Service: Gastroenterology    COLONOSCOPY N/A 5/26/2023    Procedure: COLONOSCOPY to cecum/ terminal ileum  with cold snare polypectomies;  Surgeon: Valentino Stern MD;  [facility];  Service: Gastroenterology;  Laterality: N/A;  pre- hx colon  polyps   post- diverticulosis, polyps, hemorrhoids     ENDOSCOPY N/A 08/02/2017    Procedure: ESOPHAGOGASTRODUODENOSCOPY WITH COLD BIOPSIES;  Surgeon: Valentino PEÑA MD;  Location: [facility] ENDOSCOPY;  Service:     HERNIA REPAIR  2010    ROTATOR CUFF REPAIR Left 2003    SHOULDER SURGERY      Rotator cuff    TOTAL HIP ARTHROPLASTY Right 2011    TRIGGER POINT INJECTION              Family History   Problem Relation Age of Onset    Breast cancer Mother 70    Dementia Mother     Cancer Mother     Malig Hyperthermia Neg Hx             Social History     Socioeconomic History    Marital status:      Spouse name: Kassidy    Years of education: High School   Tobacco Use    Smoking status: Never    Smokeless tobacco: Never   Vaping Use    Vaping status: Never Used   Substance and Sexual Activity    Alcohol use: No     Comment: caffeine use: 1-2 cups daily: decaf     Drug use: No    Sexual activity: Never            Allergies   Allergen Reactions    Metoclopramide Anaphylaxis    Carvedilol Unknown - Low Severity     Intolerant    Hctz [Hydrochlorothiazide] Itching    Bystolic [Nebivolol Hcl] Other (See Comments)     Bradycardia, fatigue, decreased energy     Spironolactone Rash     Rash and breast tenderness            Current Outpatient Medications on File Prior to Visit   Medication Sig Dispense Refill    Acetaminophen (TYLENOL PO) Take 1 tablet by mouth As Needed.      amLODIPine (NORVASC) 10 MG tablet TAKE 1 TABLET EVERY DAY 90 tablet 1    coenzyme Q10 50 MG capsule capsule Take 1 capsule by mouth Daily.      doxazosin (CARDURA) 4 MG  "tablet TAKE 1 AND 1/2 TABLETS EVERY  tablet 1    esomeprazole (nexIUM) 40 MG capsule Take 1 capsule by mouth Every Morning Before Breakfast.      fluticasone (FLONASE) 50 MCG/ACT nasal spray 2 sprays into the nostril(s) as directed by provider As Needed.      fluticasone (FLONASE) 50 MCG/ACT nasal spray 2 sprays into the nostril(s) as directed by provider Daily. 16 g 1    hydrALAZINE (APRESOLINE) 25 MG tablet Take 1 tablet by mouth twice daily 60 tablet 0    Lactobacillus (PROBIOTIC ACIDOPHILUS PO) Take 1 tablet by mouth Daily.      levothyroxine (SYNTHROID, LEVOTHROID) 100 MCG tablet TAKE 1 TABLET EVERY DAY 90 tablet 10    loratadine (Claritin) 10 MG tablet Take 1 tablet by mouth Daily. 30 tablet 0    losartan (COZAAR) 50 MG tablet Take 1 tablet by mouth twice daily 180 tablet 1    Omega-3 Fatty Acids (FISH OIL) 1000 MG capsule capsule Take 1 capsule by mouth Daily With Breakfast. Omega Q plus       No current facility-administered medications on file prior to visit.     /52   Pulse 67   Temp 97.6 °F (36.4 °C) (Oral)   Ht 177.8 cm (70\")   Wt 82 kg (180 lb 12.8 oz)   SpO2 96% Comment: room air  BMI 25.94 kg/m²     Objective     Physical Exam  Vitals and nursing note reviewed.   Constitutional:       General: He is not in acute distress.     Appearance: Normal appearance. He is not ill-appearing.   HENT:      Head: Normocephalic.      Right Ear: Tympanic membrane, ear canal and external ear normal. A middle ear effusion is present.      Left Ear: Tympanic membrane, ear canal and external ear normal. A middle ear effusion is present.      Nose: Nose normal.      Right Sinus: Maxillary sinus tenderness present. No frontal sinus tenderness.      Left Sinus: Maxillary sinus tenderness present. No frontal sinus tenderness.      Mouth/Throat:      Lips: Pink.      Mouth: Mucous membranes are moist.      Pharynx: Oropharynx is clear. Uvula midline. No pharyngeal swelling, oropharyngeal exudate, posterior " oropharyngeal erythema or uvula swelling.   Eyes:      Pupils: Pupils are equal, round, and reactive to light.   Cardiovascular:      Rate and Rhythm: Normal rate and regular rhythm.      Heart sounds: Normal heart sounds. No murmur heard.  Pulmonary:      Effort: Pulmonary effort is normal. No accessory muscle usage or respiratory distress.      Breath sounds: Normal breath sounds. No wheezing or rhonchi.   Musculoskeletal:      Cervical back: Normal range of motion.   Lymphadenopathy:      Cervical: No cervical adenopathy.   Skin:     General: Skin is warm and dry.   Neurological:      General: No focal deficit present.      Mental Status: He is alert and oriented to person, place, and time.   Psychiatric:         Mood and Affect: Mood and affect normal.         Behavior: Behavior normal.       Lab Results (last 24 hours)       Procedure Component Value Units Date/Time    POCT SARS-CoV-2 Antigen MERNA + Flu [[id number]] Collected: 08/16/24 1152    Specimen: Swab Updated: 08/16/24 1152     SARS Antigen Not Detected     Influenza A Antigen MERNA Not Detected     Influenza B Antigen MERNA Not Detected     Internal Control Passed     Lot Number [id number]     Expiration Date 11-2-24          Assessment & Plan  Acute non-recurrent maxillary sinusitis  Patient is negative for COVID, flu on rapid testing today.  Concern for bacterial infection given maxillary sinus tenderness on exam and duration of illness, will treat with antibiotics as below.  Continue with Flonase and Claritin.  Recommend humidification in room.  Patient declines prescription cough medication.  Increase fluid intake and rest.  Return precautions discussed.  Acute cough      Orders Placed This Encounter   Procedures    POCT SARS-CoV-2 Antigen MERNA + Flu     New Medications Ordered This Visit   Medications    amoxicillin-clavulanate (AUGMENTIN) 875-125 MG per tablet     Sig: Take 1 tablet by mouth 2 (Two) Times a Day for 7 days.     Dispense:  14 tablet      Refill:  0       Follow up:  Return if symptoms worsen or fail to improve.  Patient was given instructions and counseling regarding his condition or for health maintenance advice. Please see specific information pulled into the AVS if appropriate.

## 2024-08-27 ENCOUNTER — TELEPHONE (OUTPATIENT)
Dept: CARDIOLOGY | Facility: CLINIC | Age: 84
End: 2024-08-27
Payer: MEDICARE

## 2024-08-27 NOTE — TELEPHONE ENCOUNTER
Pt called in concerned over his most recent BP readings.  He c/o feeling a little lightheaded.  He's been taking hydralazine 25 mg BID (in addition to his other meds).    8/18: 108/54  8/27: 102/50, HR 66    He doesn't have any other recent BP readings.  Do you have any recommendations for this patient?    Thank you,    Deirdre MENSAH RN  [facility] [place] Triage  08/27/24  10:42 EDT

## 2024-08-27 NOTE — TELEPHONE ENCOUNTER
I would hold the hydralazine.     Just to confirm, he is also taking losartan 50 mg twice daily, doxazosin 6 mg, and amlodipine 10 mg?

## 2024-08-27 NOTE — TELEPHONE ENCOUNTER
I spoke with Quincy Roberts and gave them message from the provider.  They verbalized understanding & have no further questions at this time.    He confirmed he is on those medicines & doses.  I encouraged for him to start checking his BP more frequently while his BP levels out off of this medicine.  He v/u.  I've removed hydralazine from his med list.    Thank you,    Deirdre MENSAH RN  Triage [facility] [place]  08/27/24 10:49 EDT

## 2024-09-11 ENCOUNTER — TELEPHONE (OUTPATIENT)
Dept: FAMILY MEDICINE CLINIC | Age: 84
End: 2024-09-11
Payer: MEDICARE

## 2024-09-11 NOTE — TELEPHONE ENCOUNTER
Caller: Quincy Roberts    Relationship: Self    Best call back number: [phone]     What orders are you requesting (i.e. lab or imaging): LABS TO INCLUDE THE FOLLOWING:  PSA  THYROID  ANY PROVIDER RECOMMENDED    In what timeframe would the patient need to come in: PRIOR TO 9.20.2024    Where will you receive your lab/imaging services:  FACILITY    Additional notes: PATIENT WOULD LIKE TO HAVE LABS DRAWN PRIOR TO 9.20.2024 APPOINTMENT SO RESULTS CAN BE DISCUSSED AT VISIT.     PLEASE CONTACT PATIENT TO ADVISE.        JAVIER NO, CALL PREFERRED MAY LEAVE VOICEMAIL.

## 2024-09-12 DIAGNOSIS — E03.9 ACQUIRED HYPOTHYROIDISM: ICD-10-CM

## 2024-09-12 DIAGNOSIS — Z12.5 SCREENING FOR MALIGNANT NEOPLASM OF PROSTATE: ICD-10-CM

## 2024-09-12 DIAGNOSIS — I10 HYPERTENSION, ESSENTIAL: Primary | ICD-10-CM

## 2024-09-12 DIAGNOSIS — E78.2 MIXED HYPERLIPIDEMIA: ICD-10-CM

## 2024-09-17 ENCOUNTER — LAB (OUTPATIENT)
Dept: LAB | Facility: HOSPITAL | Age: 84
End: 2024-09-17
Payer: MEDICARE

## 2024-09-17 DIAGNOSIS — E78.2 MIXED HYPERLIPIDEMIA: ICD-10-CM

## 2024-09-17 DIAGNOSIS — Z12.5 SCREENING FOR MALIGNANT NEOPLASM OF PROSTATE: ICD-10-CM

## 2024-09-17 DIAGNOSIS — E03.9 ACQUIRED HYPOTHYROIDISM: ICD-10-CM

## 2024-09-17 DIAGNOSIS — I10 HYPERTENSION, ESSENTIAL: ICD-10-CM

## 2024-09-17 LAB
ALBUMIN SERPL-MCNC: 4.2 G/DL (ref 3.5–5.2)
ALBUMIN/GLOB SERPL: 1.5 G/DL
ALP SERPL-CCNC: 70 U/L (ref 39–117)
ALT SERPL W P-5'-P-CCNC: 16 U/L (ref 1–41)
ANION GAP SERPL CALCULATED.3IONS-SCNC: 7.5 MMOL/L (ref 5–15)
AST SERPL-CCNC: 20 U/L (ref 1–40)
BILIRUB SERPL-MCNC: 1 MG/DL (ref 0–1.2)
BUN SERPL-MCNC: 14 MG/DL (ref 8–23)
BUN/CREAT SERPL: 15.2 (ref 7–25)
CALCIUM SPEC-SCNC: 9.9 MG/DL (ref 8.6–10.5)
CHLORIDE SERPL-SCNC: 105 MMOL/L (ref 98–107)
CHOLEST SERPL-MCNC: 103 MG/DL (ref 0–200)
CO2 SERPL-SCNC: 24.5 MMOL/L (ref 22–29)
CREAT SERPL-MCNC: 0.92 MG/DL (ref 0.76–1.27)
EGFRCR SERPLBLD CKD-EPI 2021: 82.5 ML/MIN/1.73
GLOBULIN UR ELPH-MCNC: 2.8 GM/DL
GLUCOSE SERPL-MCNC: 112 MG/DL (ref 65–99)
HDLC SERPL-MCNC: 42 MG/DL (ref 40–60)
LDLC SERPL CALC-MCNC: 50 MG/DL (ref 0–100)
LDLC/HDLC SERPL: 1.26 {RATIO}
POTASSIUM SERPL-SCNC: 4.4 MMOL/L (ref 3.5–5.2)
PROT SERPL-MCNC: 7 G/DL (ref 6–8.5)
PSA SERPL-MCNC: 0.7 NG/ML (ref 0–4)
SODIUM SERPL-SCNC: 137 MMOL/L (ref 136–145)
TRIGL SERPL-MCNC: 40 MG/DL (ref 0–150)
TSH SERPL DL<=0.05 MIU/L-ACNC: 1.51 UIU/ML (ref 0.27–4.2)
VLDLC SERPL-MCNC: 11 MG/DL (ref 5–40)

## 2024-09-17 PROCEDURE — 80061 LIPID PANEL: CPT

## 2024-09-17 PROCEDURE — 36415 COLL VENOUS BLD VENIPUNCTURE: CPT

## 2024-09-17 PROCEDURE — 84443 ASSAY THYROID STIM HORMONE: CPT

## 2024-09-17 PROCEDURE — 80053 COMPREHEN METABOLIC PANEL: CPT

## 2024-09-17 PROCEDURE — G0103 PSA SCREENING: HCPCS

## 2024-09-20 ENCOUNTER — OFFICE VISIT (OUTPATIENT)
Dept: FAMILY MEDICINE CLINIC | Age: 84
End: 2024-09-20
Payer: MEDICARE

## 2024-09-20 VITALS
TEMPERATURE: 97.9 F | DIASTOLIC BLOOD PRESSURE: 58 MMHG | BODY MASS INDEX: 26.05 KG/M2 | SYSTOLIC BLOOD PRESSURE: 135 MMHG | HEART RATE: 59 BPM | HEIGHT: 70 IN | WEIGHT: 182 LBS

## 2024-09-20 DIAGNOSIS — E78.2 MIXED HYPERLIPIDEMIA: ICD-10-CM

## 2024-09-20 DIAGNOSIS — E03.9 ACQUIRED HYPOTHYROIDISM: ICD-10-CM

## 2024-09-20 DIAGNOSIS — I48.0 PAF (PAROXYSMAL ATRIAL FIBRILLATION): ICD-10-CM

## 2024-09-20 DIAGNOSIS — Z00.00 MEDICARE ANNUAL WELLNESS VISIT, SUBSEQUENT: Primary | ICD-10-CM

## 2024-09-20 DIAGNOSIS — Z12.5 SCREENING FOR PROSTATE CANCER: ICD-10-CM

## 2024-09-20 DIAGNOSIS — Z85.46 HISTORY OF PROSTATE CANCER: ICD-10-CM

## 2024-09-20 DIAGNOSIS — I10 HYPERTENSION, ESSENTIAL: ICD-10-CM

## 2024-09-20 PROCEDURE — 99214 OFFICE O/P EST MOD 30 MIN: CPT | Performed by: FAMILY MEDICINE

## 2024-09-20 PROCEDURE — G0439 PPPS, SUBSEQ VISIT: HCPCS | Performed by: FAMILY MEDICINE

## 2024-09-20 PROCEDURE — 3075F SYST BP GE 130 - 139MM HG: CPT | Performed by: FAMILY MEDICINE

## 2024-09-20 PROCEDURE — 1125F AMNT PAIN NOTED PAIN PRSNT: CPT | Performed by: FAMILY MEDICINE

## 2024-09-20 PROCEDURE — 1170F FXNL STATUS ASSESSED: CPT | Performed by: FAMILY MEDICINE

## 2024-09-20 PROCEDURE — 3078F DIAST BP <80 MM HG: CPT | Performed by: FAMILY MEDICINE

## 2024-10-22 ENCOUNTER — CLINICAL SUPPORT (OUTPATIENT)
Dept: FAMILY MEDICINE CLINIC | Age: 84
End: 2024-10-22
Payer: MEDICARE

## 2024-10-22 DIAGNOSIS — I10 HYPERTENSION, ESSENTIAL: ICD-10-CM

## 2024-10-22 DIAGNOSIS — Z23 NEED FOR VACCINATION: Primary | ICD-10-CM

## 2024-10-22 PROCEDURE — 90662 IIV NO PRSV INCREASED AG IM: CPT | Performed by: FAMILY MEDICINE

## 2024-10-22 PROCEDURE — G0008 ADMIN INFLUENZA VIRUS VAC: HCPCS | Performed by: FAMILY MEDICINE

## 2024-10-23 RX ORDER — AMLODIPINE BESYLATE 10 MG/1
TABLET ORAL
Qty: 90 TABLET | Refills: 1 | Status: SHIPPED | OUTPATIENT
Start: 2024-10-23

## 2024-10-25 ENCOUNTER — TELEPHONE (OUTPATIENT)
Dept: FAMILY MEDICINE CLINIC | Age: 84
End: 2024-10-25
Payer: MEDICARE

## 2024-10-28 RX ORDER — LOSARTAN POTASSIUM 50 MG/1
TABLET ORAL
Qty: 180 TABLET | Refills: 1 | Status: SHIPPED | OUTPATIENT
Start: 2024-10-28

## 2024-12-17 ENCOUNTER — LAB (OUTPATIENT)
Dept: LAB | Facility: HOSPITAL | Age: 84
End: 2024-12-17
Payer: MEDICARE

## 2024-12-17 DIAGNOSIS — Z85.46 HISTORY OF PROSTATE CANCER: ICD-10-CM

## 2024-12-17 LAB — PSA SERPL-MCNC: 0.43 NG/ML (ref 0–4)

## 2024-12-17 PROCEDURE — 36415 COLL VENOUS BLD VENIPUNCTURE: CPT

## 2024-12-17 PROCEDURE — 84153 ASSAY OF PSA TOTAL: CPT

## 2024-12-30 ENCOUNTER — OFFICE VISIT (OUTPATIENT)
Dept: FAMILY MEDICINE CLINIC | Age: 84
End: 2024-12-30
Payer: MEDICARE

## 2024-12-30 VITALS
HEART RATE: 62 BPM | TEMPERATURE: 97.8 F | DIASTOLIC BLOOD PRESSURE: 58 MMHG | SYSTOLIC BLOOD PRESSURE: 130 MMHG | OXYGEN SATURATION: 96 % | WEIGHT: 194.6 LBS | BODY MASS INDEX: 27.86 KG/M2 | HEIGHT: 70 IN

## 2024-12-30 DIAGNOSIS — B34.9 VIRAL ILLNESS: ICD-10-CM

## 2024-12-30 DIAGNOSIS — J01.10 ACUTE NON-RECURRENT FRONTAL SINUSITIS: Primary | ICD-10-CM

## 2024-12-30 LAB
EXPIRATION DATE: NORMAL
EXPIRATION DATE: NORMAL
FLUAV AG NPH QL: NEGATIVE
FLUBV AG NPH QL: NEGATIVE
INTERNAL CONTROL: NORMAL
INTERNAL CONTROL: NORMAL
Lab: NORMAL
Lab: NORMAL
SARS-COV-2 AG UPPER RESP QL IA.RAPID: NOT DETECTED

## 2024-12-30 PROCEDURE — 3078F DIAST BP <80 MM HG: CPT | Performed by: INTERNAL MEDICINE

## 2024-12-30 PROCEDURE — 1159F MED LIST DOCD IN RCRD: CPT | Performed by: INTERNAL MEDICINE

## 2024-12-30 PROCEDURE — 3075F SYST BP GE 130 - 139MM HG: CPT | Performed by: INTERNAL MEDICINE

## 2024-12-30 PROCEDURE — 1160F RVW MEDS BY RX/DR IN RCRD: CPT | Performed by: INTERNAL MEDICINE

## 2024-12-30 PROCEDURE — 99214 OFFICE O/P EST MOD 30 MIN: CPT | Performed by: INTERNAL MEDICINE

## 2024-12-30 PROCEDURE — 1125F AMNT PAIN NOTED PAIN PRSNT: CPT | Performed by: INTERNAL MEDICINE

## 2024-12-30 PROCEDURE — 87804 INFLUENZA ASSAY W/OPTIC: CPT | Performed by: INTERNAL MEDICINE

## 2024-12-30 PROCEDURE — 87426 SARSCOV CORONAVIRUS AG IA: CPT | Performed by: INTERNAL MEDICINE

## 2024-12-30 RX ORDER — FLUTICASONE PROPIONATE 50 MCG
2 SPRAY, SUSPENSION (ML) NASAL DAILY
Qty: 16 G | Refills: 1 | Status: SHIPPED | OUTPATIENT
Start: 2024-12-30

## 2024-12-30 RX ORDER — LORATADINE 10 MG/1
10 TABLET ORAL DAILY
Qty: 30 TABLET | Refills: 0 | Status: SHIPPED | OUTPATIENT
Start: 2024-12-30 | End: 2024-12-30

## 2024-12-30 RX ORDER — FLUTICASONE PROPIONATE 50 MCG
2 SPRAY, SUSPENSION (ML) NASAL DAILY
Qty: 16 G | Refills: 1 | Status: SHIPPED | OUTPATIENT
Start: 2024-12-30 | End: 2024-12-30

## 2024-12-30 RX ORDER — LORATADINE 10 MG/1
10 TABLET ORAL DAILY
Qty: 30 TABLET | Refills: 0 | Status: SHIPPED | OUTPATIENT
Start: 2024-12-30

## 2024-12-30 NOTE — PROGRESS NOTES
"Chief Complaint  Cough (Prod cough w/ yellow colored sputum and nasal congestion ) and Sinusitis (C/O sinus pain/pressure )    Subjective      Quincy Roberts is a 84 y.o. male who presents to [facility]     Patient Care Team:  Jose Valle MD as PCP - General  Jose Valle MD as Referring Physician (Family Medicine)  Adolph Martell MD as Consulting Physician (Hematology and Oncology)  Elmira Cabezas MD as Consulting Physician (Cardiology)  Patient presents to same day clinic with complaints of productive cough with nasal congestion.  Symptoms have lasted greater than 10 days. He is afebrile.  He has been using OTC cold medication without much improvement.    Review of Systems  Full review of systems obtained and pertinent positives states in above HPI.  Otherwise all others reviewed and are negative.    Objective   Vital Signs:   Vitals:    12/30/24 1403   BP: 130/58   BP Location: Left arm   Patient Position: Sitting   Cuff Size: Adult   Pulse: 62   Temp: 97.8 °F (36.6 °C)   TempSrc: Oral   SpO2: 96%   Weight: 88.3 kg (194 lb 9.6 oz)   Height: 177.8 cm (70\")     Body mass index is 27.92 kg/m².    Wt Readings from Last 3 Encounters:   12/30/24 88.3 kg (194 lb 9.6 oz)   09/20/24 82.6 kg (182 lb)   08/16/24 82 kg (180 lb 12.8 oz)     BP Readings from Last 3 Encounters:   12/30/24 130/58   09/20/24 135/58   08/16/24 133/52       Health Maintenance   Topic Date Due    RSV Vaccine - Adults (1 - 1-dose 75+ series) Never done    COVID-19 Vaccine (6 - 2024-25 season) 01/01/2025 (Originally 9/1/2024)    LIPID PANEL  09/17/2025    ANNUAL WELLNESS VISIT  09/20/2025    BMI FOLLOWUP  09/20/2025    COLORECTAL CANCER SCREENING  05/26/2028    TDAP/TD VACCINES (3 - Tdap) 09/11/2030    INFLUENZA VACCINE  Completed    Pneumococcal Vaccine 65+  Completed    ZOSTER VACCINE  Discontinued       Physical Exam   GEN:NAD, well nourished  HEENT:NCAT, erythema of oropharynx fluid behind " both tympanic membranes without erythema boggy nasal mucosa  NECK:supple, no JVD, submandibular lymphadenopathy  CVA:RRR s1, s2 no m/r/g  CHEST:CTA B no wheezes or rhonchi  ABD:soft, nontender, nondistended +bs  EXT:no c/c/e 2+PP B  NEURO:CN II-XII grossly nonfocal, no evidence of asterixes or tremor  PSYCH: appropriate mood and affect  :deferred  SKIN: warm, dry, intact, no lesions or rashes    Result Review   The following data was reviewed by: Danielle Duarte MD on 12/30/2024:  [x]  Tests & Results  []  Hospitalization/Emergency Department/Urgent Care  []  Internal/External Consultant Notes    Procedures          ASSESSMENT/PLAN  Diagnoses and all orders for this visit:    1. Acute non-recurrent frontal sinusitis (Primary)  Comments:  Augmentin x 10 days  Fluticasone nasal spray 2 sprays each nares daily  Loratadine 10 mg p.o. daily  Orders:  -     POCT Influenza A/B  -     POCT SARS-CoV-2 Antigen MERNA    2. Viral illness  Comments:  Flu and COVID swabs negative  Treatment as above  Orders:  -     Discontinue: fluticasone (FLONASE) 50 MCG/ACT nasal spray; Administer 2 sprays into the nostril(s) as directed by provider Daily.  Dispense: 16 g; Refill: 1  -     Discontinue: loratadine (Claritin) 10 MG tablet; Take 1 tablet by mouth Daily.  Dispense: 30 tablet; Refill: 0  -     fluticasone (FLONASE) 50 MCG/ACT nasal spray; Administer 2 sprays into the nostril(s) as directed by provider Daily.  Dispense: 16 g; Refill: 1  -     loratadine (Claritin) 10 MG tablet; Take 1 tablet by mouth Daily.  Dispense: 30 tablet; Refill: 0    Other orders  -     Discontinue: amoxicillin-clavulanate (AUGMENTIN) 875-125 MG per tablet; Take 1 tablet by mouth 2 (Two) Times a Day for 10 days.  Dispense: 20 tablet; Refill: 0  -     amoxicillin-clavulanate (AUGMENTIN) 875-125 MG per tablet; Take 1 tablet by mouth 2 (Two) Times a Day for 10 days.  Dispense: 20 tablet; Refill: 0              FOLLOW UP  No follow-ups on file.  Patient was  given instructions and counseling regarding his condition or for health maintenance advice. Please see specific information pulled into the AVS if appropriate.       Danielle Duarte MD  12/30/24  14:51 EST

## 2025-01-23 DIAGNOSIS — I10 HYPERTENSION, ESSENTIAL: ICD-10-CM

## 2025-01-23 RX ORDER — DOXAZOSIN 4 MG/1
TABLET ORAL
Qty: 135 TABLET | Refills: 0 | Status: SHIPPED | OUTPATIENT
Start: 2025-01-23

## 2025-03-06 ENCOUNTER — OFFICE VISIT (OUTPATIENT)
Dept: CARDIOLOGY | Facility: CLINIC | Age: 85
End: 2025-03-06
Payer: MEDICARE

## 2025-03-06 VITALS
OXYGEN SATURATION: 95 % | WEIGHT: 188 LBS | HEART RATE: 54 BPM | SYSTOLIC BLOOD PRESSURE: 140 MMHG | HEIGHT: 70 IN | DIASTOLIC BLOOD PRESSURE: 80 MMHG | BODY MASS INDEX: 26.92 KG/M2

## 2025-03-06 DIAGNOSIS — Q24.5 CORONARY-MYOCARDIAL BRIDGE: ICD-10-CM

## 2025-03-06 DIAGNOSIS — I10 HYPERTENSION, ESSENTIAL: ICD-10-CM

## 2025-03-06 DIAGNOSIS — E78.2 MIXED HYPERLIPIDEMIA: ICD-10-CM

## 2025-03-06 DIAGNOSIS — I48.0 PAF (PAROXYSMAL ATRIAL FIBRILLATION): Primary | ICD-10-CM

## 2025-03-06 PROCEDURE — 93000 ELECTROCARDIOGRAM COMPLETE: CPT | Performed by: FAMILY MEDICINE

## 2025-03-06 PROCEDURE — 99214 OFFICE O/P EST MOD 30 MIN: CPT | Performed by: FAMILY MEDICINE

## 2025-03-06 PROCEDURE — 3079F DIAST BP 80-89 MM HG: CPT | Performed by: FAMILY MEDICINE

## 2025-03-06 PROCEDURE — 3077F SYST BP >= 140 MM HG: CPT | Performed by: FAMILY MEDICINE

## 2025-03-06 RX ORDER — FUROSEMIDE 20 MG/1
20 TABLET ORAL DAILY
Qty: 30 TABLET | Refills: 0 | Status: SHIPPED | OUTPATIENT
Start: 2025-03-06

## 2025-03-06 NOTE — PROGRESS NOTES
Date of Office Visit: 2025  Encounter Provider: JANNY Mauricio  Place of Service: [facility] CARDIOLOGY  Established cardiologist: Elmira Cabezas MD  Patient Name: Quincy Roberts  :1940      Patient ID:  Quincy Roberts is a 84 y.o. male is here for  followup    With a pertinent medical history of atrial fibrillation, prostate cancer, diverticular disease, hypertension, hypothyroidism    History of Present Illness  Remote cardiac catheterization in  which showed myocardial bridging and no significant stenosis.     presented with exertional chest pain, short windedness and fatigue.  Stress nuclear study at this time was nonischemic.     diagnosed with prostate cancer follows with Dr. Garcia at first urology underwent radiation therapy.     Intolerant to HCTZ with itching and by systolic with bradycardia.    A-fib with RVR in  and underwent pulmonary vein isolation and ablation Hoa presented a few days later with palpitations and was started on amiodarone for intermittent A-fib.  He saw Dr. Olivier in follow-up 2020 and was continued on amiodarone and apixaban eventually these were weaned off.    He last saw Dr. Cabezas in the office 2023 no changes were made and he was doing well.  He saw JANNY Leblanc in the office 2024 and no changes were made.  He saw a Shona Fabian 3/25/2024 he was experiencing palpitations and a 48-hour Holter monitor was recommended.  Monitor was done and it showed 1 episode of VT lasting for 7 beats with a heart rate of 122 bpm and no symptoms in 3 episodes of SVT with a heart rate up to 118 bpm no symptoms.  There is no AV block.  No atrial fibrillation was noted.    Today Quincy presents for follow-up.  Overall he has been feeling well.  [place] and he is very active outside most days walking [place].  He denies any exertional difficulty or complaint with his activity.  He has  noticed over the past 2 months there has been swelling to his feet.  He is not experiencing any shortness of breath, dyspnea with exertion, or PND.  There is no chest pain or pressure.  There is no lightheadedness, dizziness, presyncope/syncope, heart racing, or palpitations.     Initial blood pressure today slightly above goal however he is well-controlled at home he is brought in home readings for review and his average blood pressure for the last few months has been 120s over 60s.    Current Outpatient Medications on File Prior to Visit   Medication Sig Dispense Refill    Acetaminophen (TYLENOL PO) Take 1 tablet by mouth As Needed.      amLODIPine (NORVASC) 10 MG tablet TAKE 1 TABLET EVERY DAY 90 tablet 1    coenzyme Q10 50 MG capsule capsule Take 1 capsule by mouth Daily.      doxazosin (CARDURA) 4 MG tablet TAKE 1 AND 1/2 TABLETS EVERY  tablet 0    esomeprazole (nexIUM) 40 MG capsule Take 1 capsule by mouth Every Morning Before Breakfast.      folic acid-vit B6-vit B12 (FOLTABS) 0.8-10-0.115 MG tablet tablet Take 1 tablet by mouth Daily.      Lactobacillus (PROBIOTIC ACIDOPHILUS PO) Take 1 tablet by mouth Daily.      levothyroxine (SYNTHROID, LEVOTHROID) 100 MCG tablet TAKE 1 TABLET EVERY DAY 90 tablet 10    loratadine (Claritin) 10 MG tablet Take 1 tablet by mouth Daily. 30 tablet 0    losartan (COZAAR) 50 MG tablet Take 1 tablet by mouth twice daily 180 tablet 1    Omega-3 Fatty Acids (FISH OIL) 1000 MG capsule capsule Take 1 capsule by mouth Daily With Breakfast. Omega Q plus      Turmeric (QC TUMERIC COMPLEX PO) Take  by mouth.      [DISCONTINUED] fluticasone (FLONASE) 50 MCG/ACT nasal spray Administer 2 sprays into the nostril(s) as directed by provider Daily. 16 g 1     No current facility-administered medications on file prior to visit.         Procedures    ECG 12 Lead    Date/Time: 3/6/2025 4:00 PM  Performed by: Beth Saxena APRN    Authorized by: Beth Saxena APRN  Comparison:  "compared with previous ECG from 3/25/2024  Similar to previous ECG  Comparison to previous ECG: Nonspecific [id number] abnormalities are again seen in the anterolateral leads  Rhythm: sinus rhythm  BPM: 54              Objective:      Vitals:    03/06/25 1423   BP: 140/80   Pulse: 54   SpO2: 95%   Weight: 85.3 kg (188 lb)   Height: 177.8 cm (70\")     Body mass index is 26.98 kg/m².  Wt Readings from Last 3 Encounters:   03/06/25 85.3 kg (188 lb)   12/30/24 88.3 kg (194 lb 9.6 oz)   09/20/24 82.6 kg (182 lb)         Constitutional:       Appearance: Not in distress.   Eyes:      Pupils: Pupils are equal, round, and reactive to light.   Pulmonary:      Effort: Pulmonary effort is normal.      Breath sounds: Normal breath sounds.   Cardiovascular:      Normal rate. Regular rhythm.      Murmurs: There is no murmur.   Pulses:     Intact distal pulses.   Edema:     Ankle: bilateral 1+ edema of the ankle.     Feet: bilateral 1+ edema of the feet.  Musculoskeletal:      Cervical back: Normal range of motion. Skin:     General: Skin is warm and dry.   Neurological:      Mental Status: Alert, oriented to person, place, and time and oriented to person, place and time.   Psychiatric:         Speech: Speech normal.       Lab Review:   9/17/2024 blood glucose 112 otherwise unremarkable CMP, total cholesterol 103 TG 40 HDL 42 LDL 50    Assessment:     1. PAF (paroxysmal atrial fibrillation)    2. Hypertension, essential    3. Mixed hyperlipidemia    4. Coronary-myocardial bridge      Bilateral lower extremity edema on amlodipine, has had several antihypertensive intolerances and were not changing medicines today with the absence of any other symptoms.   Hypertension is well-controlled at home  History of palpitations 48-hour Holter 3/2024 with a few brief episodes of SVT. he has no palpitations now.  Paroxysmal atrial fibrillation status post ablation in 2020 is no longer anticoagulated.   History of myocardial bridging on " cardiac catheterization 2002  Stage I prostate cancer 2015 status post radiation with Dr. Garcia  Hypothyroidism on levothyroxine    Plan:   No medication changes were made  In the absence of symptoms I suspect his lower extremity edema is from his amlodipine.  We discussed typically this is a side effect of only cosmetic consequence and because he has had so many intolerances to several blood pressure medications we are not making any changes just yet.   I did give him low-dose of furosemide to use as needed.  The swelling does not really bother him.  Discussed repeating echocardiogram as he has not done so since 2020, but he is not having any other symptoms.   He will return in 3 months he will monitor daily weights and call us with any unusual gains and with any worsened swelling.   No follow-ups on file.        Thank you for allowing me to participate in this patient's care. Please call with any questions or concerns.          Dragon dictation device was utilized in this note.

## 2025-03-07 ENCOUNTER — TELEPHONE (OUTPATIENT)
Dept: FAMILY MEDICINE CLINIC | Age: 85
End: 2025-03-07
Payer: COMMERCIAL

## 2025-03-07 NOTE — TELEPHONE ENCOUNTER
Caller: Quincy Roberts    Relationship: Self    Best call back number: [phone]     What orders are you requesting (i.e. lab or imaging): LABS     In what timeframe would the patient need to come in: AS SOON AS POSSIBLE     Where will you receive your lab/imaging services:     Additional notes: PATIENT CALLED IN STATING HE HAS AN APPOINTMENT WITH MD MATA ON 3.14.25 AND IS WONDERING IF HE WILL NEEDS LAB WORK DONE.     IF SO, PATIENT IS REQUESTING THE LAB ORDERS TO BE PUT IN AS SOON AS POSSIBLE SO HE CAN GO GET THEM DONE BEFORE HIS APPOINTMENT. PATIENT WOULD LIKE A CALL BACK IF THOSE ORDERS ARE PUT IN SO HE IS AWARE- IT IS OKAY TO LEAVE A MESSAGE IF NO ANSWER.

## 2025-03-10 ENCOUNTER — LAB (OUTPATIENT)
Dept: LAB | Facility: HOSPITAL | Age: 85
End: 2025-03-10
Payer: MEDICARE

## 2025-03-10 DIAGNOSIS — R73.9 HYPERGLYCEMIA: ICD-10-CM

## 2025-03-10 DIAGNOSIS — E03.9 ACQUIRED HYPOTHYROIDISM: ICD-10-CM

## 2025-03-10 DIAGNOSIS — I10 HYPERTENSION, ESSENTIAL: ICD-10-CM

## 2025-03-10 DIAGNOSIS — E78.2 MIXED HYPERLIPIDEMIA: ICD-10-CM

## 2025-03-10 LAB
ALBUMIN SERPL-MCNC: 3.8 G/DL (ref 3.5–5.2)
ALBUMIN/GLOB SERPL: 1.2 G/DL
ALP SERPL-CCNC: 77 U/L (ref 39–117)
ALT SERPL W P-5'-P-CCNC: 15 U/L (ref 1–41)
ANION GAP SERPL CALCULATED.3IONS-SCNC: 9.3 MMOL/L (ref 5–15)
AST SERPL-CCNC: 41 U/L (ref 1–40)
BASOPHILS # BLD AUTO: 0.04 10*3/MM3 (ref 0–0.2)
BASOPHILS NFR BLD AUTO: 1.3 % (ref 0–1.5)
BILIRUB SERPL-MCNC: 0.8 MG/DL (ref 0–1.2)
BUN SERPL-MCNC: 17 MG/DL (ref 8–23)
BUN/CREAT SERPL: 13.9 (ref 7–25)
CALCIUM SPEC-SCNC: 9.4 MG/DL (ref 8.6–10.5)
CHLORIDE SERPL-SCNC: 104 MMOL/L (ref 98–107)
CHOLEST SERPL-MCNC: 100 MG/DL (ref 0–200)
CO2 SERPL-SCNC: 24.7 MMOL/L (ref 22–29)
CREAT SERPL-MCNC: 1.22 MG/DL (ref 0.76–1.27)
DEPRECATED RDW RBC AUTO: 57.5 FL (ref 37–54)
EGFRCR SERPLBLD CKD-EPI 2021: 58.5 ML/MIN/1.73
EOSINOPHIL # BLD AUTO: 0.16 10*3/MM3 (ref 0–0.4)
EOSINOPHIL NFR BLD AUTO: 5.1 % (ref 0.3–6.2)
ERYTHROCYTE [DISTWIDTH] IN BLOOD BY AUTOMATED COUNT: 14.4 % (ref 12.3–15.4)
GLOBULIN UR ELPH-MCNC: 3.2 GM/DL
GLUCOSE SERPL-MCNC: 109 MG/DL (ref 65–99)
HCT VFR BLD AUTO: 32.7 % (ref 37.5–51)
HDLC SERPL-MCNC: 36 MG/DL (ref 40–60)
HGB BLD-MCNC: 11.1 G/DL (ref 13–17.7)
IMM GRANULOCYTES # BLD AUTO: 0.01 10*3/MM3 (ref 0–0.05)
IMM GRANULOCYTES NFR BLD AUTO: 0.3 % (ref 0–0.5)
LDLC SERPL CALC-MCNC: 53 MG/DL (ref 0–100)
LDLC/HDLC SERPL: 1.55 {RATIO}
LYMPHOCYTES # BLD AUTO: 1.09 10*3/MM3 (ref 0.7–3.1)
LYMPHOCYTES NFR BLD AUTO: 34.7 % (ref 19.6–45.3)
MCH RBC QN AUTO: 36.4 PG (ref 26.6–33)
MCHC RBC AUTO-ENTMCNC: 33.9 G/DL (ref 31.5–35.7)
MCV RBC AUTO: 107.2 FL (ref 79–97)
MONOCYTES # BLD AUTO: 0.41 10*3/MM3 (ref 0.1–0.9)
MONOCYTES NFR BLD AUTO: 13.1 % (ref 5–12)
NEUTROPHILS NFR BLD AUTO: 1.43 10*3/MM3 (ref 1.7–7)
NEUTROPHILS NFR BLD AUTO: 45.5 % (ref 42.7–76)
PLATELET # BLD AUTO: 201 10*3/MM3 (ref 140–450)
PMV BLD AUTO: 9.7 FL (ref 6–12)
POTASSIUM SERPL-SCNC: 4.3 MMOL/L (ref 3.5–5.2)
PROT SERPL-MCNC: 7 G/DL (ref 6–8.5)
RBC # BLD AUTO: 3.05 10*6/MM3 (ref 4.14–5.8)
SODIUM SERPL-SCNC: 138 MMOL/L (ref 136–145)
TRIGL SERPL-MCNC: 41 MG/DL (ref 0–150)
TSH SERPL DL<=0.05 MIU/L-ACNC: 3.46 UIU/ML (ref 0.27–4.2)
VLDLC SERPL-MCNC: 11 MG/DL (ref 5–40)
WBC NRBC COR # BLD AUTO: 3.14 10*3/MM3 (ref 3.4–10.8)

## 2025-03-10 PROCEDURE — 84443 ASSAY THYROID STIM HORMONE: CPT

## 2025-03-10 PROCEDURE — 36415 COLL VENOUS BLD VENIPUNCTURE: CPT

## 2025-03-10 PROCEDURE — 85025 COMPLETE CBC W/AUTO DIFF WBC: CPT

## 2025-03-10 PROCEDURE — 83036 HEMOGLOBIN GLYCOSYLATED A1C: CPT

## 2025-03-10 PROCEDURE — 80061 LIPID PANEL: CPT

## 2025-03-10 PROCEDURE — 80053 COMPREHEN METABOLIC PANEL: CPT

## 2025-03-13 DIAGNOSIS — R73.9 HYPERGLYCEMIA: Primary | ICD-10-CM

## 2025-03-13 LAB — HBA1C MFR BLD: 4.5 % (ref 4.8–5.6)

## 2025-03-14 ENCOUNTER — OFFICE VISIT (OUTPATIENT)
Dept: FAMILY MEDICINE CLINIC | Age: 85
End: 2025-03-14
Payer: MEDICARE

## 2025-03-14 VITALS
TEMPERATURE: 97.9 F | BODY MASS INDEX: 26.83 KG/M2 | HEIGHT: 70 IN | SYSTOLIC BLOOD PRESSURE: 145 MMHG | OXYGEN SATURATION: 96 % | DIASTOLIC BLOOD PRESSURE: 57 MMHG | HEART RATE: 60 BPM | WEIGHT: 187.4 LBS

## 2025-03-14 DIAGNOSIS — Z23 NEED FOR VACCINATION: ICD-10-CM

## 2025-03-14 DIAGNOSIS — E03.9 ACQUIRED HYPOTHYROIDISM: ICD-10-CM

## 2025-03-14 DIAGNOSIS — E78.2 MIXED HYPERLIPIDEMIA: ICD-10-CM

## 2025-03-14 DIAGNOSIS — I10 HYPERTENSION, ESSENTIAL: Primary | ICD-10-CM

## 2025-03-14 DIAGNOSIS — R60.0 PEDAL EDEMA: ICD-10-CM

## 2025-03-14 NOTE — PROGRESS NOTES
Chief Complaint  Hypertension (6 mo f/u ), Hyperlipidemia, and Hypothyroidism    Subjective          Quincy Roberts presents to [facility]  History of Present Illness      He had cardiology follow-up on March 6 and that note is reviewed.  48-hour Holter monitor last year revealed a 7 beat run of VT which was asymptomatic and 3 episodes of SVT maximum heart rate of 118 which also remained asymptomatic.  They were no episodes of atrial fibrillation.  He does of course have a prior history of atrial fibrillation.  Had been taken off of hydralazine last year due to lightheadedness.  Said that he was experiencing some edema suspected to be secondary to the amlodipine so started him on 20 mg Lasix.  Blood pressure running a little elevated today but he says he has a log of blood pressure readings just forgot to bring them in here.  Said he took him to his cardiology appointment, and that they have looked good.      Current Outpatient Medications on File Prior to Visit   Medication Sig Dispense Refill    Acetaminophen (TYLENOL PO) Take 1 tablet by mouth As Needed.      amLODIPine (NORVASC) 10 MG tablet TAKE 1 TABLET EVERY DAY 90 tablet 1    coenzyme Q10 50 MG capsule capsule Take 1 capsule by mouth Daily.      doxazosin (CARDURA) 4 MG tablet TAKE 1 AND 1/2 TABLETS EVERY  tablet 0    esomeprazole (nexIUM) 40 MG capsule Take 1 capsule by mouth Every Morning Before Breakfast.      folic acid-vit B6-vit B12 (FOLTABS) 0.8-10-0.115 MG tablet tablet Take 1 tablet by mouth Daily.      furosemide (LASIX) 20 MG tablet Take 1 tablet by mouth Daily. 30 tablet 0    Lactobacillus (PROBIOTIC ACIDOPHILUS PO) Take 1 tablet by mouth Daily.      levothyroxine (SYNTHROID, LEVOTHROID) 100 MCG tablet TAKE 1 TABLET EVERY DAY 90 tablet 10    loratadine (Claritin) 10 MG tablet Take 1 tablet by mouth Daily. (Patient taking differently: Take 1 tablet by mouth As Needed.) 30 tablet 0    losartan (COZAAR) 50 MG  "tablet Take 1 tablet by mouth twice daily 180 tablet 1    Omega-3 Fatty Acids (FISH OIL) 1000 MG capsule capsule Take 1 capsule by mouth Daily With Breakfast. Omega Q plus      Turmeric (QC TUMERIC COMPLEX PO) Take  by mouth.       No current facility-administered medications on file prior to visit.       Review of Systems         Objective   Vital Signs:   /57 (BP Location: Left arm, Patient Position: Sitting, Cuff Size: Adult)   Pulse 60   Temp 97.9 °F (36.6 °C) (Temporal)   Ht 177.8 cm (70\")   Wt 85 kg (187 lb 6.4 oz)   SpO2 96%   BMI 26.89 kg/m²     Physical Exam     He looks great for 84 years of age  No acute distress  Color is good  Tympanic membranes clear  Oropharynx clear  No cervical or supraclavicular adenopathy  Heart is regular rate and rhythm 2/6 systolic murmur  Lungs bilaterally clear  Lower extremity still has trace to 1+ pitting edema at the ankles bilaterally  Neurologic without gross lateralizing focal deficit      Result Review :       Hemoglobin A1c (03/10/2025 09:08)  TSH Rfx On Abnormal To Free T4 (03/10/2025 09:08)  CBC & Differential (03/10/2025 09:08)  Lipid Panel (03/10/2025 09:08)  Comprehensive Metabolic Panel (03/10/2025 09:08)              Assessment and Plan    Diagnoses and all orders for this visit:    1. Hypertension, essential (Primary)  Assessment & Plan:  Blood pressure little elevated today but he reports good readings at home.  Continue his current regimen for now       2. Mixed hyperlipidemia  Assessment & Plan:  Reviewed recent labs look great.  Continue on current regimen       3. Acquired hypothyroidism  Assessment & Plan:  Recent lab is okay continue on current regimen      4. Pedal edema  Assessment & Plan:  Recommend he wear compression stockings in addition to the low-dose Lasix.      5. Need for vaccination  Comments:  Provided him information about the RSV vaccine        Follow Up   No follow-ups on file.  Patient was given instructions and counseling " regarding his condition or for health maintenance advice. Please see specific information pulled into the AVS if appropriate.

## 2025-03-14 NOTE — ASSESSMENT & PLAN NOTE
Blood pressure little elevated today but he reports good readings at home.  Continue his current regimen for now

## 2025-04-03 RX ORDER — FUROSEMIDE 20 MG/1
20 TABLET ORAL DAILY
Qty: 30 TABLET | Refills: 0 | Status: SHIPPED | OUTPATIENT
Start: 2025-04-03

## 2025-04-28 DIAGNOSIS — I10 HYPERTENSION, ESSENTIAL: ICD-10-CM

## 2025-04-28 RX ORDER — LOSARTAN POTASSIUM 50 MG/1
50 TABLET ORAL 2 TIMES DAILY
Qty: 180 TABLET | Refills: 1 | Status: SHIPPED | OUTPATIENT
Start: 2025-04-28

## 2025-04-28 RX ORDER — AMLODIPINE BESYLATE 10 MG/1
10 TABLET ORAL DAILY
Qty: 90 TABLET | Refills: 1 | Status: SHIPPED | OUTPATIENT
Start: 2025-04-28

## 2025-04-28 RX ORDER — DOXAZOSIN 4 MG/1
6 TABLET ORAL DAILY
Qty: 135 TABLET | Refills: 1 | Status: SHIPPED | OUTPATIENT
Start: 2025-04-28

## 2025-05-19 RX ORDER — FUROSEMIDE 20 MG/1
20 TABLET ORAL DAILY
Qty: 30 TABLET | Refills: 0 | Status: SHIPPED | OUTPATIENT
Start: 2025-05-19

## 2025-06-10 ENCOUNTER — OFFICE VISIT (OUTPATIENT)
Dept: CARDIOLOGY | Age: 85
End: 2025-06-10
Payer: MEDICARE

## 2025-06-10 VITALS
WEIGHT: 181 LBS | BODY MASS INDEX: 25.97 KG/M2 | OXYGEN SATURATION: 96 % | HEART RATE: 52 BPM | SYSTOLIC BLOOD PRESSURE: 140 MMHG | DIASTOLIC BLOOD PRESSURE: 60 MMHG

## 2025-06-10 DIAGNOSIS — E78.2 MIXED HYPERLIPIDEMIA: ICD-10-CM

## 2025-06-10 DIAGNOSIS — I10 HYPERTENSION, ESSENTIAL: ICD-10-CM

## 2025-06-10 DIAGNOSIS — I48.0 PAF (PAROXYSMAL ATRIAL FIBRILLATION): Primary | ICD-10-CM

## 2025-06-10 DIAGNOSIS — Q24.5 CORONARY-MYOCARDIAL BRIDGE: ICD-10-CM

## 2025-06-10 PROCEDURE — 3077F SYST BP >= 140 MM HG: CPT | Performed by: FAMILY MEDICINE

## 2025-06-10 PROCEDURE — 3078F DIAST BP <80 MM HG: CPT | Performed by: FAMILY MEDICINE

## 2025-06-10 NOTE — PROGRESS NOTES
Date of Office Visit: 06/10/2025  Encounter Provider: JANNY Mauricio  Place of Service: [facility] CARDIOLOGY  Established cardiologist: Elmira Cabezas MD  Patient Name: Quincy Roberts  :1940      Patient ID:  Quincy Roberts is a 84 y.o. male is here for  followup    With a pertinent medical history of atrial fibrillation, prostate cancer, diverticular disease, hypertension, hypothyroidism     History of Present Illness  Remote cardiac catheterization in  which showed myocardial bridging and no significant stenosis.      presented with exertional chest pain, short windedness and fatigue.  Stress nuclear study at this time was nonischemic.      diagnosed with prostate cancer follows with Dr. Garcia at first urology underwent radiation therapy.      Intolerant to HCTZ with itching and by systolic with bradycardia.     A-fib with RVR in  and underwent pulmonary vein isolation and ablation Hoa presented a few days later with palpitations and was started on amiodarone for intermittent A-fib.  He saw Dr. Olivier in follow-up 2020 and was continued on amiodarone and apixaban eventually these were weaned off.     He last saw Dr. Cabezas in the office 2023 no changes were made and he was doing well.  He saw JANNY Leblanc in the office 2024 and no changes were made.  He saw a Shona Neumanner 3/25/2024 he was experiencing palpitations and a 48-hour Holter monitor was recommended.  Monitor was done and it showed 1 episode of VT lasting for 7 beats with a heart rate of 122 bpm and no symptoms in 3 episodes of SVT with a heart rate up to 118 bpm no symptoms.  There is no AV block.  No atrial fibrillation was noted.    I saw him in the office 3/6/25 he had new feet swelling for two months. We felt this was likely due to his amlodipine, he had no other symptoms. He could use furosemide as needed. Today he tells me he has worked on keeping his  weight down and he is down 6 lbs since last visit, that his swelling has been much better, he might use the furosemide twice weekly or less. 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 No exertional difficulty with that. There is no chest pain or pressure, soa, palomares, pnd, lightheadedness, dizziness, presyncope/syncope,  heart racing, or palpitations.   His blood pressures at home have been very well-controlled running 106/56, 128/56, 123/58, 147/64, 110/61, 122/56 over the last week at home.           Current Outpatient Medications on File Prior to Visit   Medication Sig Dispense Refill    Acetaminophen (TYLENOL PO) Take 1 tablet by mouth As Needed.      amLODIPine (NORVASC) 10 MG tablet TAKE 1 TABLET EVERY DAY 90 tablet 1    coenzyme Q10 50 MG capsule capsule Take 1 capsule by mouth Daily.      doxazosin (CARDURA) 4 MG tablet TAKE 1 AND 1/2 TABLETS EVERY  tablet 1    esomeprazole (nexIUM) 40 MG capsule Take 1 capsule by mouth Every Morning Before Breakfast.      folic acid-vit B6-vit B12 (FOLTABS) 0.8-10-0.115 MG tablet tablet Take 1 tablet by mouth Daily.      furosemide (LASIX) 20 MG tablet Take 1 tablet by mouth once daily 30 tablet 0    Lactobacillus (PROBIOTIC ACIDOPHILUS PO) Take 1 tablet by mouth Daily.      levothyroxine (SYNTHROID, LEVOTHROID) 100 MCG tablet TAKE 1 TABLET EVERY DAY 90 tablet 10    loratadine (Claritin) 10 MG tablet Take 1 tablet by mouth Daily. (Patient taking differently: Take 1 tablet by mouth As Needed.) 30 tablet 0    losartan (COZAAR) 50 MG tablet Take 1 tablet by mouth twice daily 180 tablet 1    Omega-3 Fatty Acids (FISH OIL) 1000 MG capsule capsule Take 1 capsule by mouth Daily With Breakfast. Omega Q plus      Turmeric (QC TUMERIC COMPLEX PO) Take  by mouth.       No current facility-administered medications on file prior to visit.         Procedures    ECG 12 Lead    Date/Time: 6/10/2025 9:28 AM  Performed by: Beth Saxena APRN    Authorized by:  Beth Saxena APRN  Comparison: compared with previous ECG from 3/6/2025  Rhythm: sinus rhythm  BPM: 52              Objective:      Vitals:    06/10/25 0837   BP: 140/60   Pulse: 52   SpO2: 96%   Weight: 82.1 kg (181 lb)     Body mass index is 25.97 kg/m².  Wt Readings from Last 3 Encounters:   06/10/25 82.1 kg (181 lb)   03/14/25 85 kg (187 lb 6.4 oz)   03/06/25 85.3 kg (188 lb)     Constitutional:       Appearance: Not in distress.   Eyes:      Pupils: Pupils are equal, round, and reactive to light.   Pulmonary:      Effort: Pulmonary effort is normal.      Breath sounds: Normal breath sounds.   Cardiovascular:      Normal rate. Regular rhythm.      Murmurs: There is no murmur.   Pulses:     Intact distal pulses.   Edema:     Edema is not present today.   Musculoskeletal:      Cervical back: Normal range of motion. Skin:     General: Skin is warm and dry.   Neurological:      Mental Status: Alert, oriented to person, place, and time and oriented to person, place and time.   Psychiatric:         Speech: Speech normal.      Lab Review:   3/10/2025 hemoglobin A1c 4.50.  TSH 3.460.  Hemoglobin 11.1 hematocrit 32.7, macrocytic total cholesterol 100 TG 41 HDL 36 LDL 53. AST 41 otherwise unremarkable CMP.  Assessment:     1. PAF (paroxysmal atrial fibrillation)    2. Hypertension, essential    3. Mixed hyperlipidemia    4. Coronary-myocardial bridge      Bilateral lower extremity edema on amlodipine, has had intolerances to multiple antihypertensive medications.  Hypertension is well-controlled at home  History of palpitations 48-hour Holter 3/2024 with a few brief episodes of SVT. he has no palpitations now.  Paroxysmal atrial fibrillation status post ablation in 2020 is no longer anticoagulated.   History of myocardial bridging on cardiac catheterization 2002  Stage I prostate cancer 2015 status post radiation with Dr. Garcia  Hypothyroidism on levothyroxine    Plan:   No medication changes were  made  Stable on amlodipine and as needed furosemide.  There is no edema present today he has no symptoms.  Blood pressure is well-controlled at home.   Return in about 9 months (around 3/10/2026) for Dr. Cabezas.        Thank you for allowing me to participate in this patient's care. Please call with any questions or concerns.          Dragon dictation device was utilized in this note.

## 2025-07-16 RX ORDER — LEVOTHYROXINE SODIUM 100 UG/1
100 TABLET ORAL DAILY
Qty: 90 TABLET | Refills: 0 | Status: SHIPPED | OUTPATIENT
Start: 2025-07-16

## (undated) DEVICE — 1 X VERSACROSS TRANSSEPTAL SHEATH (INCLUDING  1 X J-TIP GUIDEWIRE); 1 X VERSACROSS RF WIRE (INCLUDING 1 X CONNECTOR CABLE (SINGLE USE)); 1 X DISPERSIVE ELECTRODE: Brand: VERSACROSS ACCESS SOLUTION

## (undated) DEVICE — Device: Brand: SOUNDSTAR

## (undated) DEVICE — Device

## (undated) DEVICE — FRCP BX RADJAW4 NDL 2.8 240CM LG OG BX40

## (undated) DEVICE — THE TORRENT IRRIGATION SCOPE CONNECTOR IS USED WITH THE TORRENT IRRIGATION TUBING TO PROVIDE IRRIGATION FLUIDS SUCH AS STERILE WATER DURING GASTROINTESTINAL ENDOSCOPIC PROCEDURES WHEN USED IN CONJUNCTION WITH AN IRRIGATION PUMP (OR ELECTROSURGICAL UNIT).: Brand: TORRENT

## (undated) DEVICE — CANN NASL CO2 TRULINK W/O2 A/

## (undated) DEVICE — Device: Brand: DEFENDO AIR/WATER/SUCTION AND BIOPSY VALVE

## (undated) DEVICE — SENSR O2 OXIMAX FNGR A/ 18IN NONSTR

## (undated) DEVICE — ADAPT CLN BIOGUARD AIR/H2O DISP

## (undated) DEVICE — TUBING, SUCTION, 1/4" X 10', STRAIGHT: Brand: MEDLINE

## (undated) DEVICE — PROB S-CATH TEMP ESOPH 10F

## (undated) DEVICE — Device: Brand: PENTARAY NAV

## (undated) DEVICE — BITEBLOCK OMNI BLOC

## (undated) DEVICE — CLEARSIGHT FINGER CUFF LARGE MULTI PACK: Brand: CLEARSIGHT

## (undated) DEVICE — PINNACLE INTRODUCER SHEATH: Brand: PINNACLE

## (undated) DEVICE — CATH CRYO/ABL ARCTICFRONTADVANCE MAP 12F 28MM

## (undated) DEVICE — KT ORCA ORCAPOD DISP STRL

## (undated) DEVICE — Device: Brand: DECANAV

## (undated) DEVICE — CANN O2 ETCO2 FITS ALL CONN CO2 SMPL A/ 7IN DISP LF

## (undated) DEVICE — KT MANIFLD CARDIAC

## (undated) DEVICE — CATH ABL ACHIEVE MP 3.3F20MM 165CM

## (undated) DEVICE — Device: Brand: REFERENCE PATCH CARTO 3

## (undated) DEVICE — COPILOT BLEEDBACK CONTROL VALVE: Brand: COPILOT

## (undated) DEVICE — CANNULA,ADULT,SOFT-TOUCH,7TUBE,SC: Brand: MEDLINE

## (undated) DEVICE — LOU EP: Brand: MEDLINE INDUSTRIES, INC.

## (undated) DEVICE — TBG 02 CRUSH RESIST LF CLR 7FT

## (undated) DEVICE — LN SMPL CO2 SHTRM SD STREAM W/M LUER

## (undated) DEVICE — SHEATH FLXCATH STEER 12FR